# Patient Record
Sex: MALE | Race: WHITE | NOT HISPANIC OR LATINO | URBAN - METROPOLITAN AREA
[De-identification: names, ages, dates, MRNs, and addresses within clinical notes are randomized per-mention and may not be internally consistent; named-entity substitution may affect disease eponyms.]

---

## 2020-03-17 ENCOUNTER — INPATIENT (INPATIENT)
Facility: HOSPITAL | Age: 60
LOS: 3 days | Discharge: HOME | DRG: 312 | End: 2020-03-21
Attending: INTERNAL MEDICINE | Admitting: INTERNAL MEDICINE
Payer: COMMERCIAL

## 2020-03-17 VITALS
HEART RATE: 87 BPM | RESPIRATION RATE: 16 BRPM | TEMPERATURE: 99 F | HEIGHT: 66 IN | WEIGHT: 145.06 LBS | SYSTOLIC BLOOD PRESSURE: 109 MMHG | OXYGEN SATURATION: 99 % | DIASTOLIC BLOOD PRESSURE: 69 MMHG

## 2020-03-17 DIAGNOSIS — R55 SYNCOPE AND COLLAPSE: ICD-10-CM

## 2020-03-17 DIAGNOSIS — Z98.89 OTHER SPECIFIED POSTPROCEDURAL STATES: Chronic | ICD-10-CM

## 2020-03-17 LAB
ALBUMIN SERPL ELPH-MCNC: 3.8 G/DL — SIGNIFICANT CHANGE UP (ref 3.5–5)
ALP SERPL-CCNC: 62 U/L — SIGNIFICANT CHANGE UP (ref 40–120)
ALT FLD-CCNC: 32 U/L DA — SIGNIFICANT CHANGE UP (ref 10–60)
ANION GAP SERPL CALC-SCNC: 7 MMOL/L — SIGNIFICANT CHANGE UP (ref 5–17)
AST SERPL-CCNC: 39 U/L — SIGNIFICANT CHANGE UP (ref 10–40)
BASOPHILS # BLD AUTO: 0 K/UL — SIGNIFICANT CHANGE UP (ref 0–0.2)
BASOPHILS NFR BLD AUTO: 0 % — SIGNIFICANT CHANGE UP (ref 0–2)
BILIRUB SERPL-MCNC: 0.5 MG/DL — SIGNIFICANT CHANGE UP (ref 0.2–1.2)
BUN SERPL-MCNC: 31 MG/DL — HIGH (ref 7–18)
CALCIUM SERPL-MCNC: 8.4 MG/DL — SIGNIFICANT CHANGE UP (ref 8.4–10.5)
CHLORIDE SERPL-SCNC: 101 MMOL/L — SIGNIFICANT CHANGE UP (ref 96–108)
CO2 SERPL-SCNC: 27 MMOL/L — SIGNIFICANT CHANGE UP (ref 22–31)
CREAT SERPL-MCNC: 1.31 MG/DL — HIGH (ref 0.5–1.3)
EOSINOPHIL # BLD AUTO: 0 K/UL — SIGNIFICANT CHANGE UP (ref 0–0.5)
EOSINOPHIL NFR BLD AUTO: 0 % — SIGNIFICANT CHANGE UP (ref 0–6)
GLUCOSE SERPL-MCNC: 94 MG/DL — SIGNIFICANT CHANGE UP (ref 70–99)
HCT VFR BLD CALC: 34.1 % — LOW (ref 39–50)
HGB BLD-MCNC: 11.3 G/DL — LOW (ref 13–17)
LIDOCAIN IGE QN: 153 U/L — SIGNIFICANT CHANGE UP (ref 73–393)
LYMPHOCYTES # BLD AUTO: 30 % — SIGNIFICANT CHANGE UP (ref 13–44)
LYMPHOCYTES # BLD AUTO: 6.43 K/UL — HIGH (ref 1–3.3)
MANUAL SMEAR VERIFICATION: SIGNIFICANT CHANGE UP
MCHC RBC-ENTMCNC: 29.7 PG — SIGNIFICANT CHANGE UP (ref 27–34)
MCHC RBC-ENTMCNC: 33.1 GM/DL — SIGNIFICANT CHANGE UP (ref 32–36)
MCV RBC AUTO: 89.7 FL — SIGNIFICANT CHANGE UP (ref 80–100)
MONOCYTES # BLD AUTO: 0.64 K/UL — SIGNIFICANT CHANGE UP (ref 0–0.9)
MONOCYTES NFR BLD AUTO: 3 % — SIGNIFICANT CHANGE UP (ref 2–14)
NEUTROPHILS # BLD AUTO: 7.29 K/UL — SIGNIFICANT CHANGE UP (ref 1.8–7.4)
NEUTROPHILS NFR BLD AUTO: 31 % — LOW (ref 43–77)
NEUTS BAND # BLD: 3 % — SIGNIFICANT CHANGE UP (ref 0–8)
NRBC # BLD: 0 /100 — SIGNIFICANT CHANGE UP (ref 0–0)
PLAT MORPH BLD: NORMAL — SIGNIFICANT CHANGE UP
PLATELET # BLD AUTO: 117 K/UL — LOW (ref 150–400)
POTASSIUM SERPL-MCNC: 3.9 MMOL/L — SIGNIFICANT CHANGE UP (ref 3.5–5.3)
POTASSIUM SERPL-SCNC: 3.9 MMOL/L — SIGNIFICANT CHANGE UP (ref 3.5–5.3)
PROT SERPL-MCNC: 7.4 G/DL — SIGNIFICANT CHANGE UP (ref 6–8.3)
RBC # BLD: 3.8 M/UL — LOW (ref 4.2–5.8)
RBC # FLD: 13.5 % — SIGNIFICANT CHANGE UP (ref 10.3–14.5)
RBC BLD AUTO: NORMAL — SIGNIFICANT CHANGE UP
SODIUM SERPL-SCNC: 135 MMOL/L — SIGNIFICANT CHANGE UP (ref 135–145)
TROPONIN I SERPL-MCNC: <0.015 NG/ML — SIGNIFICANT CHANGE UP (ref 0–0.04)
VARIANT LYMPHS # BLD: 33 % — HIGH (ref 0–6)
WBC # BLD: 21.43 K/UL — HIGH (ref 3.8–10.5)
WBC # FLD AUTO: 21.43 K/UL — HIGH (ref 3.8–10.5)

## 2020-03-17 PROCEDURE — 99285 EMERGENCY DEPT VISIT HI MDM: CPT

## 2020-03-17 PROCEDURE — 70450 CT HEAD/BRAIN W/O DYE: CPT | Mod: 26

## 2020-03-17 PROCEDURE — 99223 1ST HOSP IP/OBS HIGH 75: CPT

## 2020-03-17 PROCEDURE — 74176 CT ABD & PELVIS W/O CONTRAST: CPT | Mod: 26

## 2020-03-17 PROCEDURE — 71046 X-RAY EXAM CHEST 2 VIEWS: CPT | Mod: 26

## 2020-03-17 RX ORDER — KETOROLAC TROMETHAMINE 30 MG/ML
15 SYRINGE (ML) INJECTION ONCE
Refills: 0 | Status: DISCONTINUED | OUTPATIENT
Start: 2020-03-17 | End: 2020-03-17

## 2020-03-17 RX ORDER — SODIUM CHLORIDE 9 MG/ML
1000 INJECTION INTRAMUSCULAR; INTRAVENOUS; SUBCUTANEOUS ONCE
Refills: 0 | Status: COMPLETED | OUTPATIENT
Start: 2020-03-17 | End: 2020-03-17

## 2020-03-17 RX ORDER — ASPIRIN/CALCIUM CARB/MAGNESIUM 324 MG
81 TABLET ORAL ONCE
Refills: 0 | Status: COMPLETED | OUTPATIENT
Start: 2020-03-17 | End: 2020-03-17

## 2020-03-17 RX ADMIN — SODIUM CHLORIDE 1000 MILLILITER(S): 9 INJECTION INTRAMUSCULAR; INTRAVENOUS; SUBCUTANEOUS at 22:34

## 2020-03-17 RX ADMIN — SODIUM CHLORIDE 1000 MILLILITER(S): 9 INJECTION INTRAMUSCULAR; INTRAVENOUS; SUBCUTANEOUS at 19:58

## 2020-03-17 RX ADMIN — Medication 15 MILLIGRAM(S): at 22:48

## 2020-03-17 RX ADMIN — Medication 15 MILLIGRAM(S): at 22:47

## 2020-03-17 RX ADMIN — Medication 81 MILLIGRAM(S): at 22:47

## 2020-03-17 NOTE — ED PROVIDER NOTE - MUSCULOSKELETAL, MLM
Spine appears normal, range of motion is not limited, no muscle or joint tenderness, b/l radial pulses., no CVA tenderness.

## 2020-03-17 NOTE — H&P ADULT - PROBLEM SELECTOR PLAN 1
Patient is being admitted for syncopal workup most likely vasovagal syncope.  Patient has nausea , vomiting with syncopal episode.  While trying to get orthostatic, he felt dizzy and his blood pressure dropped. s/p 1 L bolus followed by aggressive IV hydration.   EKG is negative for any acute ischemic changes, troponin T1 is negative. less likely to be cardiac. will trend troponin.   Monitor for cardiac arrythmia on telemetry.  CT head did show a questionable emboli.   Will admit to telemetry to rule out stroke.   No neurological changes on examination.   Offered CT angio but he refused because of ? allergy last time he had CT scan.

## 2020-03-17 NOTE — H&P ADULT - ASSESSMENT
60 years old male from home, lives with wife, with a PMHx of HLP (on simvastatin) and a significant PSHx of appendectomy, presents to the ED with complaints of unwitnessed syncopal episode x2. Patient reports he ate some pizza and then vomited. Notes when he got up, he felt a room spinning sensation, ear ringing and passed out. Notes no other symptoms after passing out. Patient states he then again had a second syncopal episode when he got up quickly from the first. Notes his has happened in the past which improved with rehydration. Patient wife states that  second episode was witnessed by her. Patient LOC was 1-2 minutes and patient was asymptomatic after the episode. Patient endorses mild upset stomach. Patient denies incontinence, chest pain, shortness of breath, fever, cough, burning urination or any other symptoms.  Patient is being admitted for syncopal workup most likely vasovagal syncope. EKG is negative for any acute ischemic changes, troponin T1 is negative. CT head did show a questionable emboli. Will admit to telemetry to rule out stroke. No neurological changes on examination. Offered CT angio but he refused because of ? allergy last time he had CT scan.  Interval history: While trying to get orthostatic, he felt dizzy and his blood pressure dropped. s/p 1 L bolus followed by aggressive IV hydration.   Allergies: Dye/contrast: difficulty breathing.

## 2020-03-17 NOTE — ED PROVIDER NOTE - CONSTITUTIONAL, MLM
normal... Nontoxic appearing, awake, alert, oriented to person, place, time/situation and in no apparent distress.

## 2020-03-17 NOTE — ED ADULT NURSE NOTE - RESPIRATORY ASSESSMENT
Cardiology Progress Note   Being discharged today  Note dictated    Diagnoses:  Acute on chronic diastolic heart failure  S/P karolyn clip with functional mild stenosis  Moderate pulmonary hypertension  CKD-stage 3  HTN  Afib-now in NSR, on anticoagulation  DM  Gout  Hx TIA  Reflux  Anemia with low iron    Bernie Schlatter, MD - - -

## 2020-03-17 NOTE — H&P ADULT - PROBLEM SELECTOR PLAN 3
IMPROVE VTE Individual Risk Assessment  RISK                                                          Points  [] Previous VTE                                           3  [] Thrombophilia                                        2  [] Lower limb paralysis                              2   [] Current Cancer                                       2   [] Immobilization > 24 hrs                        1  [] ICU/CCU stay > 24 hours                       1  [] Age > 60                                                   1  IMPROVE VTE Score = 2  lovenox 40 mg for DVT chemoprophylaxis Patient has history of HLP.  on simvastatin at home.  will resume.  follow up lipid panel in am

## 2020-03-17 NOTE — ED PROVIDER NOTE - PROGRESS NOTE DETAILS
Logan: ct head reviewed and concern for possible emboli vs artifact.  sella mass.  pt refused cta head and neck.  ct abd done shows gb stone.  neurologically intact. dysphagia pass. NIH 0  admit for syncope workup/.

## 2020-03-17 NOTE — ED PROVIDER NOTE - CLINICAL SUMMARY MEDICAL DECISION MAKING FREE TEXT BOX
59 y/o M pt presents with syncopal episode. Will r/o arrythmia vs acs vs pain induced syncope vs dehydration. Patient is otherwise neurologically intact. Patient has LOC, will r/o any possible intercranial bleed, hydrate and reassess. If asymptomatic, can possibly discharge patient home with outpatient syncope workup including Holter, cardiac and neuro follow up.

## 2020-03-17 NOTE — H&P ADULT - NSHPPHYSICALEXAM_GEN_ALL_CORE
Vital Signs Last 24 Hrs  T(C): 38.8 (18 Mar 2020 00:10), Max: 38.8 (18 Mar 2020 00:10)  T(F): 101.9 (18 Mar 2020 00:10), Max: 101.9 (18 Mar 2020 00:10)  HR: 85 (18 Mar 2020 00:10) (83 - 87)  BP: 88/54 (18 Mar 2020 01:19) (76/43 - 110/74)  BP(mean): --  RR: 20 (18 Mar 2020 01:19) (16 - 20)  SpO2: 98% (18 Mar 2020 01:19) (96% - 99%)  PHYSICAL EXAM:  GENERAL: NAD, speaks in full sentences, no signs of respiratory distress  HEAD:  Atraumatic, Normocephalic  EYES: EOMI, PERRLA, conjunctiva and sclera clear  NECK: Supple, No JVD  CHEST/LUNG: Clear to auscultation bilaterally; No wheeze; No crackles; No accessory muscles used  HEART: Regular rate and rhythm; No murmurs;   ABDOMEN: Soft, Nontender, Nondistended; Bowel sounds present; No guarding  EXTREMITIES:  2+ Peripheral Pulses, No cyanosis or edema  PSYCH: AAOx3  NEUROLOGY: non-focal  SKIN: No rashes or lesions

## 2020-03-17 NOTE — ED PROVIDER NOTE - OBJECTIVE STATEMENT
61 y/o M pt with a PMHx of HTN and a significant PSHx of appendectomy, presents to the ED with complaints of unwitnessed syncopal episode x2. Patient reports he ate some pizza and then vomited. Notes when he got up, he felt a room spinning sensation, ear ringing and passed out. Notes no other symptoms after passing out. Patient states he then again had a second syncopal episode when he got up quickly from the first. Notes his has happened in the past and he just hydrated. Patient endorses mild upset stomach. Patient denies incontinence, chest pain, shortness of breath, fever, cough, burning urination or any other symptoms. NKDA.

## 2020-03-17 NOTE — H&P ADULT - ATTENDING COMMENTS
Vital Signs Last 24 Hrs  T(C): 37.9 (17 Mar 2020 20:54), Max: 37.9 (17 Mar 2020 20:54)  T(F): 100.3 (17 Mar 2020 20:54), Max: 100.3 (17 Mar 2020 20:54)  HR: 83 (17 Mar 2020 20:54) (83 - 87)  BP: 110/74 (17 Mar 2020 20:54) (109/69 - 110/74)  BP(mean): --  RR: 17 (17 Mar 2020 20:54) (16 - 17)  SpO2: 99% (17 Mar 2020 20:54) (99% - 99%) Pt seen and examined. Case discussed with MAR  60 year old man with medical hx only for HLD presenting with sudden onset nausea, vomiting, diaphoresis, and abdominal pain/dizziness/vertigo after a meal of Pizza. This was followed by 2 episodes of unwitnessed syncope.   No symptoms of focal deficit.  Pt became feverish and hypotensive in the ED; IVF boluses were and sepsis work up ordered.    Vital Signs Last 24 Hrs  T(C): 37.9 (17 Mar 2020 20:54), Max: 37.9 (17 Mar 2020 20:54)  T(F): 100.3 (17 Mar 2020 20:54), Max: 100.3 (17 Mar 2020 20:54)  HR: 83 (17 Mar 2020 20:54) (83 - 87)  BP: 110/74 (17 Mar 2020 20:54) (109/69 - 110/74)  RR: 17 (17 Mar 2020 20:54) (16 - 17)  SpO2: 99% (17 Mar 2020 20:54) (99% - 99%)    Middle aged man, NAD AAO X 3  CTA B/L RRR S1S2 only  Soft NT ND BS +  No pedal edema  No focal deficits    Labs                        11.3   21.43 )-----------( 117      ( 17 Mar 2020 19:59 )             34.1     03-17    135  |  101  |  31<H>  ----------------------------<  94  3.9   |  27  |  1.31<H>    Ca    8.4      17 Mar 2020 19:59  TPro  7.4  /  Alb  3.8  /  TBili  0.5  /  DBili  x   /  AST  39  /  ALT  32  /  AlkPhos  62  03-17    CT head  No acute intracranial hemorrhage.   Apparent hyperdensity in the left intracranial ICA just proximal to the bifurcation. This may be artifactual or due to presence of embolus. If clinically indicated, CTA may be pursued for further evaluation.  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 If clinically indicated, MR may be pursued for further evaluation.    CT abdomen - unremarkable  CXR - no acute issues    Impression  - Syncope   1) vasovagal  2) Orthostatic  3) Posterior circulation CVA r/o  - +ve orthostasis especially dizziness with sitting up   Continue hydration and monitor for fluid overload  CT head noted; pt refused CTA head and neck because of prior allergic reaction to contrast with throat closing.  Neurology consult  MRA /MRI brain  Monitor closely.  Fever/hypotension  - Sepsis of unclear origin  Follow up sepsis work up  Continue aggressive fluid hydration   CXR unremarkable  UA pending  No active symptoms  Hold off antibiotics  F/up blood cxx Pt seen and examined. Case discussed with MAR  60 year old man with medical hx only for HLD presenting with sudden onset nausea, vomiting, diaphoresis, and abdominal pain/dizziness/vertigo after a meal of Pizza. This was followed by 2 episodes of unwitnessed syncope.   No symptoms of focal deficit.  Pt became feverish and hypotensive in the ED; IVF boluses were and sepsis work up ordered.    Vital Signs Last 24 Hrs  T(C): 37.9 (17 Mar 2020 20:54), Max: 37.9 (17 Mar 2020 20:54)  T(F): 100.3 (17 Mar 2020 20:54), Max: 100.3 (17 Mar 2020 20:54)  HR: 83 (17 Mar 2020 20:54) (83 - 87)  BP: 110/74 (17 Mar 2020 20:54) (109/69 - 110/74)  RR: 17 (17 Mar 2020 20:54) (16 - 17)  SpO2: 99% (17 Mar 2020 20:54) (99% - 99%)    Middle aged man, NAD AAO X 3  CTA B/L RRR S1S2 only  Soft NT ND BS +  No pedal edema  No focal deficits    Labs                        11.3   21.43 )-----------( 117      ( 17 Mar 2020 19:59 )             34.1     03-17    135  |  101  |  31<H>  ----------------------------<  94  3.9   |  27  |  1.31<H>    Ca    8.4      17 Mar 2020 19:59  TPro  7.4  /  Alb  3.8  /  TBili  0.5  /  DBili  x   /  AST  39  /  ALT  32  /  AlkPhos  62  03-17    CT head  No acute intracranial hemorrhage.   Apparent hyperdensity in the left intracranial ICA just proximal to the bifurcation. This may be artifactual or due to presence of embolus. If clinically indicated, CTA may be pursued for further evaluation.  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 If clinically indicated, MR may be pursued for further evaluation.    CT abdomen - unremarkable  CXR - no acute issues    Impression  - Syncope   1) vasovagal  2) Orthostatic  3) Posterior circulation CVA r/o  - +ve orthostasis especially dizziness with sitting up   Continue hydration and monitor for fluid overload  CT head noted; pt refused CTA head and neck because of prior allergic reaction to contrast with throat closing.  Neurology consult  MRA /MRI brain  Monitor closely.  Fever/hypotension  - Sepsis of unclear origin  Follow up sepsis work up  Continue aggressive fluid hydration   CXR unremarkable  UA pending  No active symptoms  Hold off antibiotics  F/up blood cx    - REBECCA  Pre-renal  IVF hydration Pt seen and examined. Case discussed with MAR  60 year old man with medical hx only for HLD presenting with sudden onset nausea, vomiting, diaphoresis, and abdominal pain/dizziness/vertigo after a meal of Pizza. This was followed by 2 episodes of unwitnessed syncope.   No symptoms of focal deficit.  Pt became feverish and hypotensive in the ED; IVF boluses were and sepsis work up ordered.    Vital Signs Last 24 Hrs  T(C): 37.9 (17 Mar 2020 20:54), Max: 37.9 (17 Mar 2020 20:54)  T(F): 100.3 (17 Mar 2020 20:54), Max: 100.3 (17 Mar 2020 20:54)  HR: 83 (17 Mar 2020 20:54) (83 - 87)  BP: 110/74 (17 Mar 2020 20:54) (109/69 - 110/74)  RR: 17 (17 Mar 2020 20:54) (16 - 17)  SpO2: 99% (17 Mar 2020 20:54) (99% - 99%)    Middle aged man, NAD AAO X 3  CTA B/L RRR S1S2 only  Soft NT ND BS +  No pedal edema  No focal deficits    Labs                        11.3   21.43 )-----------( 117      ( 17 Mar 2020 19:59 )             34.1     03-17    135  |  101  |  31<H>  ----------------------------<  94  3.9   |  27  |  1.31<H>    Ca    8.4      17 Mar 2020 19:59  TPro  7.4  /  Alb  3.8  /  TBili  0.5  /  DBili  x   /  AST  39  /  ALT  32  /  AlkPhos  62  03-17    CT head  No acute intracranial hemorrhage.   Apparent hyperdensity in the left intracranial ICA just proximal to the bifurcation. This may be artifactual or due to presence of embolus. If clinically indicated, CTA may be pursued for further evaluation.  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 If clinically indicated, MR may be pursued for further evaluation.    CT abdomen - unremarkable  CXR - no acute issues    Impression  - Syncope   1) vasovagal  2) Orthostatic  3) Posterior circulation CVA r/o  - +ve orthostasis especially dizziness with sitting up   Continue hydration and monitor for fluid overload  CT head noted; concern for carotid thrombus and sellar mass. Pt refused CTA head and neck because of prior allergic reaction to contrast with throat closing.  Neurology consult  MRA /MRI brain  Monitor closely.  Fever/hypotension  - Sepsis of unclear origin  Follow up sepsis work up  Continue aggressive fluid hydration   CXR unremarkable  UA pending  No active symptoms  Hold off antibiotics  F/up blood cx    - REBECCA  Pre-renal  IVF hydration

## 2020-03-17 NOTE — H&P ADULT - PROBLEM SELECTOR PLAN 2
Patient has history of HLP.  on simvastatin at home.  will resume.  follow up lipid panel in am Patient spiked a fever in ED.  No subjective fevers at home.  Patient was also hypotensive.  Will rule out sepsis.  CXR does not show any infiltrates, UA is negative, denies any GI symptoms.  F/u blood cultures and urine cultures.  will hold off on antibiotics for now.

## 2020-03-18 DIAGNOSIS — Z29.9 ENCOUNTER FOR PROPHYLACTIC MEASURES, UNSPECIFIED: ICD-10-CM

## 2020-03-18 DIAGNOSIS — A41.9 SEPSIS, UNSPECIFIED ORGANISM: ICD-10-CM

## 2020-03-18 DIAGNOSIS — Z71.89 OTHER SPECIFIED COUNSELING: ICD-10-CM

## 2020-03-18 DIAGNOSIS — E78.00 PURE HYPERCHOLESTEROLEMIA, UNSPECIFIED: ICD-10-CM

## 2020-03-18 DIAGNOSIS — R55 SYNCOPE AND COLLAPSE: ICD-10-CM

## 2020-03-18 DIAGNOSIS — Z02.9 ENCOUNTER FOR ADMINISTRATIVE EXAMINATIONS, UNSPECIFIED: ICD-10-CM

## 2020-03-18 LAB
ALBUMIN SERPL ELPH-MCNC: 2.5 G/DL — LOW (ref 3.5–5)
ALP SERPL-CCNC: 37 U/L — LOW (ref 40–120)
ALT FLD-CCNC: 23 U/L DA — SIGNIFICANT CHANGE UP (ref 10–60)
ANION GAP SERPL CALC-SCNC: 4 MMOL/L — LOW (ref 5–17)
APPEARANCE UR: CLEAR — SIGNIFICANT CHANGE UP
APTT BLD: 37.3 SEC — HIGH (ref 27.5–36.3)
AST SERPL-CCNC: 29 U/L — SIGNIFICANT CHANGE UP (ref 10–40)
BACTERIA # UR AUTO: ABNORMAL /HPF
BASOPHILS # BLD AUTO: 0.03 K/UL — SIGNIFICANT CHANGE UP (ref 0–0.2)
BASOPHILS NFR BLD AUTO: 0.2 % — SIGNIFICANT CHANGE UP (ref 0–2)
BILIRUB DIRECT SERPL-MCNC: 0.1 MG/DL — SIGNIFICANT CHANGE UP (ref 0–0.2)
BILIRUB INDIRECT FLD-MCNC: 0.3 MG/DL — SIGNIFICANT CHANGE UP (ref 0.2–1)
BILIRUB SERPL-MCNC: 0.4 MG/DL — SIGNIFICANT CHANGE UP (ref 0.2–1.2)
BILIRUB UR-MCNC: NEGATIVE — SIGNIFICANT CHANGE UP
BUN SERPL-MCNC: 21 MG/DL — HIGH (ref 7–18)
CALCIUM SERPL-MCNC: 6.3 MG/DL — CRITICAL LOW (ref 8.4–10.5)
CHLORIDE SERPL-SCNC: 112 MMOL/L — HIGH (ref 96–108)
CHOLEST SERPL-MCNC: 106 MG/DL — SIGNIFICANT CHANGE UP (ref 10–199)
CK MB BLD-MCNC: <0.5 % — SIGNIFICANT CHANGE UP (ref 0–3.5)
CK MB CFR SERPL CALC: <1 NG/ML — SIGNIFICANT CHANGE UP (ref 0–3.6)
CK SERPL-CCNC: 217 U/L — SIGNIFICANT CHANGE UP (ref 35–232)
CO2 SERPL-SCNC: 23 MMOL/L — SIGNIFICANT CHANGE UP (ref 22–31)
COLOR SPEC: YELLOW — SIGNIFICANT CHANGE UP
CORTIS AM PEAK SERPL-MCNC: 1.6 UG/DL — LOW (ref 6–18.4)
CREAT SERPL-MCNC: 0.98 MG/DL — SIGNIFICANT CHANGE UP (ref 0.5–1.3)
DIFF PNL FLD: ABNORMAL
EOSINOPHIL # BLD AUTO: 0.02 K/UL — SIGNIFICANT CHANGE UP (ref 0–0.5)
EOSINOPHIL NFR BLD AUTO: 0.1 % — SIGNIFICANT CHANGE UP (ref 0–6)
EPI CELLS # UR: SIGNIFICANT CHANGE UP /HPF
GLUCOSE SERPL-MCNC: 80 MG/DL — SIGNIFICANT CHANGE UP (ref 70–99)
GLUCOSE UR QL: NEGATIVE — SIGNIFICANT CHANGE UP
HBA1C BLD-MCNC: 5.5 % — SIGNIFICANT CHANGE UP (ref 4–5.6)
HCT VFR BLD CALC: 26.5 % — LOW (ref 39–50)
HCV AB S/CO SERPL IA: 0.16 S/CO — SIGNIFICANT CHANGE UP (ref 0–0.99)
HCV AB SERPL-IMP: SIGNIFICANT CHANGE UP
HDLC SERPL-MCNC: 32 MG/DL — LOW
HGB BLD-MCNC: 8.8 G/DL — LOW (ref 13–17)
HYALINE CASTS # UR AUTO: ABNORMAL /LPF
IMM GRANULOCYTES NFR BLD AUTO: 0.2 % — SIGNIFICANT CHANGE UP (ref 0–1.5)
INR BLD: 1.09 RATIO — SIGNIFICANT CHANGE UP (ref 0.88–1.16)
KETONES UR-MCNC: NEGATIVE — SIGNIFICANT CHANGE UP
LACTATE SERPL-SCNC: 0.5 MMOL/L — LOW (ref 0.7–2)
LACTATE SERPL-SCNC: 0.7 MMOL/L — SIGNIFICANT CHANGE UP (ref 0.7–2)
LEUKOCYTE ESTERASE UR-ACNC: NEGATIVE — SIGNIFICANT CHANGE UP
LIPID PNL WITH DIRECT LDL SERPL: 60 MG/DL — SIGNIFICANT CHANGE UP
LYMPHOCYTES # BLD AUTO: 15.97 K/UL — HIGH (ref 1–3.3)
LYMPHOCYTES # BLD AUTO: 80 % — HIGH (ref 13–44)
MAGNESIUM SERPL-MCNC: 1.7 MG/DL — SIGNIFICANT CHANGE UP (ref 1.6–2.6)
MCHC RBC-ENTMCNC: 29.7 PG — SIGNIFICANT CHANGE UP (ref 27–34)
MCHC RBC-ENTMCNC: 33.2 GM/DL — SIGNIFICANT CHANGE UP (ref 32–36)
MCV RBC AUTO: 89.5 FL — SIGNIFICANT CHANGE UP (ref 80–100)
MONOCYTES # BLD AUTO: 0.44 K/UL — SIGNIFICANT CHANGE UP (ref 0–0.9)
MONOCYTES NFR BLD AUTO: 2.2 % — SIGNIFICANT CHANGE UP (ref 2–14)
NEUTROPHILS # BLD AUTO: 3.47 K/UL — SIGNIFICANT CHANGE UP (ref 1.8–7.4)
NEUTROPHILS NFR BLD AUTO: 17.3 % — LOW (ref 43–77)
NITRITE UR-MCNC: NEGATIVE — SIGNIFICANT CHANGE UP
NRBC # BLD: 0 /100 WBCS — SIGNIFICANT CHANGE UP (ref 0–0)
PH UR: 6.5 — SIGNIFICANT CHANGE UP (ref 5–8)
PHOSPHATE SERPL-MCNC: 2.3 MG/DL — LOW (ref 2.5–4.5)
PLATELET # BLD AUTO: 98 K/UL — LOW (ref 150–400)
POTASSIUM SERPL-MCNC: 3.9 MMOL/L — SIGNIFICANT CHANGE UP (ref 3.5–5.3)
POTASSIUM SERPL-SCNC: 3.9 MMOL/L — SIGNIFICANT CHANGE UP (ref 3.5–5.3)
PROT SERPL-MCNC: 4.9 G/DL — LOW (ref 6–8.3)
PROT UR-MCNC: NEGATIVE — SIGNIFICANT CHANGE UP
PROTHROM AB SERPL-ACNC: 12.3 SEC — SIGNIFICANT CHANGE UP (ref 10–12.9)
RBC # BLD: 2.96 M/UL — LOW (ref 4.2–5.8)
RBC # FLD: 13.8 % — SIGNIFICANT CHANGE UP (ref 10.3–14.5)
RBC CASTS # UR COMP ASSIST: ABNORMAL /HPF (ref 0–2)
SODIUM SERPL-SCNC: 139 MMOL/L — SIGNIFICANT CHANGE UP (ref 135–145)
SP GR SPEC: 1.01 — SIGNIFICANT CHANGE UP (ref 1.01–1.02)
TOTAL CHOLESTEROL/HDL RATIO MEASUREMENT: 3.3 RATIO — LOW (ref 3.4–9.6)
TRIGL SERPL-MCNC: 69 MG/DL — SIGNIFICANT CHANGE UP (ref 10–149)
TROPONIN I SERPL-MCNC: <0.015 NG/ML — SIGNIFICANT CHANGE UP (ref 0–0.04)
TSH SERPL-MCNC: 0.57 UU/ML — SIGNIFICANT CHANGE UP (ref 0.34–4.82)
UROBILINOGEN FLD QL: NEGATIVE — SIGNIFICANT CHANGE UP
VIT B12 SERPL-MCNC: 781 PG/ML — SIGNIFICANT CHANGE UP (ref 232–1245)
WBC # BLD: 19.97 K/UL — HIGH (ref 3.8–10.5)
WBC # FLD AUTO: 19.97 K/UL — HIGH (ref 3.8–10.5)
WBC UR QL: SIGNIFICANT CHANGE UP /HPF (ref 0–5)

## 2020-03-18 PROCEDURE — 71275 CT ANGIOGRAPHY CHEST: CPT | Mod: 26

## 2020-03-18 PROCEDURE — 99232 SBSQ HOSP IP/OBS MODERATE 35: CPT

## 2020-03-18 PROCEDURE — 99223 1ST HOSP IP/OBS HIGH 75: CPT

## 2020-03-18 RX ORDER — ENOXAPARIN SODIUM 100 MG/ML
40 INJECTION SUBCUTANEOUS DAILY
Refills: 0 | Status: DISCONTINUED | OUTPATIENT
Start: 2020-03-18 | End: 2020-03-18

## 2020-03-18 RX ORDER — SODIUM CHLORIDE 9 MG/ML
1000 INJECTION INTRAMUSCULAR; INTRAVENOUS; SUBCUTANEOUS ONCE
Refills: 0 | Status: COMPLETED | OUTPATIENT
Start: 2020-03-18 | End: 2020-03-18

## 2020-03-18 RX ORDER — ACETAMINOPHEN 500 MG
650 TABLET ORAL EVERY 6 HOURS
Refills: 0 | Status: DISCONTINUED | OUTPATIENT
Start: 2020-03-18 | End: 2020-03-21

## 2020-03-18 RX ORDER — SIMVASTATIN 20 MG/1
1 TABLET, FILM COATED ORAL
Qty: 0 | Refills: 0 | DISCHARGE

## 2020-03-18 RX ORDER — ACETAMINOPHEN 500 MG
650 TABLET ORAL ONCE
Refills: 0 | Status: COMPLETED | OUTPATIENT
Start: 2020-03-18 | End: 2020-03-18

## 2020-03-18 RX ORDER — ENOXAPARIN SODIUM 100 MG/ML
60 INJECTION SUBCUTANEOUS
Refills: 0 | Status: DISCONTINUED | OUTPATIENT
Start: 2020-03-18 | End: 2020-03-18

## 2020-03-18 RX ORDER — NICOTINE POLACRILEX 2 MG
1 GUM BUCCAL DAILY
Refills: 0 | Status: DISCONTINUED | OUTPATIENT
Start: 2020-03-18 | End: 2020-03-20

## 2020-03-18 RX ORDER — SODIUM CHLORIDE 9 MG/ML
1000 INJECTION INTRAMUSCULAR; INTRAVENOUS; SUBCUTANEOUS
Refills: 0 | Status: DISCONTINUED | OUTPATIENT
Start: 2020-03-18 | End: 2020-03-21

## 2020-03-18 RX ADMIN — Medication 125 MILLIGRAM(S): at 08:22

## 2020-03-18 RX ADMIN — SODIUM CHLORIDE 1000 MILLILITER(S): 9 INJECTION INTRAMUSCULAR; INTRAVENOUS; SUBCUTANEOUS at 00:26

## 2020-03-18 RX ADMIN — SODIUM CHLORIDE 125 MILLILITER(S): 9 INJECTION INTRAMUSCULAR; INTRAVENOUS; SUBCUTANEOUS at 17:48

## 2020-03-18 RX ADMIN — SODIUM CHLORIDE 125 MILLILITER(S): 9 INJECTION INTRAMUSCULAR; INTRAVENOUS; SUBCUTANEOUS at 21:49

## 2020-03-18 RX ADMIN — SODIUM CHLORIDE 2000 MILLILITER(S): 9 INJECTION INTRAMUSCULAR; INTRAVENOUS; SUBCUTANEOUS at 05:29

## 2020-03-18 RX ADMIN — SODIUM CHLORIDE 1000 MILLILITER(S): 9 INJECTION INTRAMUSCULAR; INTRAVENOUS; SUBCUTANEOUS at 08:00

## 2020-03-18 RX ADMIN — SODIUM CHLORIDE 125 MILLILITER(S): 9 INJECTION INTRAMUSCULAR; INTRAVENOUS; SUBCUTANEOUS at 05:29

## 2020-03-18 RX ADMIN — Medication 650 MILLIGRAM(S): at 00:26

## 2020-03-18 RX ADMIN — ENOXAPARIN SODIUM 40 MILLIGRAM(S): 100 INJECTION SUBCUTANEOUS at 11:52

## 2020-03-18 RX ADMIN — Medication 650 MILLIGRAM(S): at 00:57

## 2020-03-18 NOTE — ED ADULT NURSE REASSESSMENT NOTE - NS ED NURSE REASSESS COMMENT FT1
pt has low B/P  ,boluses N/S given .pt asymptomatic .aaox4 ,ambulating to rest room . and DR. OLIVO at the bedside .b/p 87/54 hr 67 100% on room air .pt resting now .report given to HAMILTON RANGEL

## 2020-03-18 NOTE — CONSULT NOTE ADULT - CONSULT REQUESTED DATE/TIME
Pt called today with c/o pain in the area of the prostate, sluggish feeling and fatigue.  Pt states that he felt the same way last time he had prostatitis and says he felt better after being treated with abx.  He is calling today asking if he should be treated with abx again or if another treatment would be helpful.  Pt denies pain in groin, testicles and abd.  Pt also denies fever, chills, n/v and urinary symptoms.  Will discuss with Dr. Hollingsworth.    18-Mar-2020 15:54

## 2020-03-18 NOTE — CONSULT NOTE ADULT - ASSESSMENT
Assessment:  - Hypotension. Dehydration in the setting of vomiting and diarrhea vs Vasovagal R/O PE vs Sepsis? No identifiable source   - Syncope   - HLD    Plan:  Neuro:  - AAO X3  - avoid any sedating meds  - C/W sedation  -  No acute neurological changes on examination.       CV:  - Hypotension while trying to get orthostatics. Likely in the setting of dehydration.  - Clinically dehydrated. Received 3 liters of NS in the ED. Will give additional liter   - Will get Chest CT Angio R.O PE.  - EKG is negative for any acute ischemic changes. Troponin negative  - Monitor for cardiac arrythmia on telemetry.    Pulm:  - No active pulmonary disease  - Chest X-ray negative     ID:  - One episode of fever in the ED with Elevated WBC of 19K. No identifiable source at this time. U/A Negative and Chest X-ray Negative  - Hold off on Abx at this time  - Lactate Normal   - Check Blood Cultures    Nephro:  - No active Renal Issues  - Continue to follow     GI:  - npo for CT angio   -  Ct of the abdomen: No small bowel obstruction or active bowel inflammation. Cholelithiasis.     Heme:  - no indication for transfusion at this time.   - Hb of 8.8   - Recommenced Iron Studies and FOBT     Endo:  - no history of DM   - target CBG < 180  - Start diet when patient is able to tolerate    Prophy:  - C/W Lovenox     Dispo:  -  No need for ICU at this time. Reconsult as needed

## 2020-03-18 NOTE — PROGRESS NOTE ADULT - SUBJECTIVE AND OBJECTIVE BOX
HPI:  60 years old male from home, lives with wife, with a PMHx of HLP (on simvastatin) and a significant PSHx of appendectomy, presents to the ED with complaints of unwitnessed syncopal episode x2.  Patient examined at bedside, resting comfortably, denies pain, SOB or N&V.  Afebrile, VSS, NAD.    OVERNIGHT EVENTS:  No new overnight events     REVIEW OF SYSTEMS:      CONSTITUTIONAL: No fever,   EYES: no acute visual disturbances  NECK: No pain or stiffness  RESPIRATORY: No cough; No shortness of breath  CARDIOVASCULAR: No chest pain, no palpitations  GASTROINTESTINAL: No pain. No nausea, vomiting or diarrhea   NEUROLOGICAL: No headache or numbness, no tremors  MUSCULOSKELETAL: No joint pain, no muscle pain  GENITOURINARY: no dysuria, no frequency, no hesitancy  PSYCHIATRY: no depression , no anxiety  ALL OTHER  ROS negative        Vital Signs Last 24 Hrs  T(C): 37.2 (18 Mar 2020 10:58), Max: 38.8 (18 Mar 2020 00:10)  T(F): 99 (18 Mar 2020 10:58), Max: 101.9 (18 Mar 2020 00:10)  HR: 85 (18 Mar 2020 12:39) (67 - 89)  BP: 119/67 (18 Mar 2020 12:39) (76/43 - 119/67)  BP(mean): --  RR: 18 (18 Mar 2020 12:39) (16 - 20)  SpO2: 99% (18 Mar 2020 12:39) (94% - 99%)    ________________________________________________  PHYSICAL EXAM:    GENERAL: NAD  HEENT: Normocephalic; conjunctivae and sclerae clear; moist mucous membranes;   NECK : supple, no JVD  CHEST/LUNG: Clear to auscultation bilaterally   HEART: S1 S2  regular  ABDOMEN: Soft, Nontender, Nondistended; Bowel sounds present  EXTREMITIES: no cyanosis; no LE edema; no calf tenderness  SKIN: warm and dry; no rash  NERVOUS SYSTEM:  Alert & Oriented x3; no new deficits    _________________________________________________  CURRENT MEDICATIONS:    MEDICATIONS  (STANDING):  sodium chloride 0.9%. 1000 milliLiter(s) (125 mL/Hr) IV Continuous <Continuous>    MEDICATIONS  (PRN):  acetaminophen   Tablet .. 650 milliGRAM(s) Oral every 6 hours PRN Temp greater or equal to 38C (100.4F), Moderate Pain (4 - 6)      __________________________________________________  LABS:                          8.8    19.97 )-----------( 98       ( 18 Mar 2020 05:55 )             26.5     03-18    139  |  112<H>  |  21<H>  ----------------------------<  80  3.9   |  23  |  0.98    Ca    6.3<LL>      18 Mar 2020 05:55  Phos  2.3       Mg     1.7         TPro  4.9<L>  /  Alb  2.5<L>  /  TBili  0.4  /  DBili  0.1  /  AST  29  /  ALT  23  /  AlkPhos  37<L>  -18    PT/INR - ( 18 Mar 2020 08:33 )   PT: 12.3 sec;   INR: 1.09 ratio         PTT - ( 18 Mar 2020 08:33 )  PTT:37.3 sec  Urinalysis Basic - ( 18 Mar 2020 03:26 )    Color: Yellow / Appearance: Clear / S.010 / pH: x  Gluc: x / Ketone: Negative  / Bili: Negative / Urobili: Negative   Blood: x / Protein: Negative / Nitrite: Negative   Leuk Esterase: Negative / RBC: 2-5 /HPF / WBC 0-2 /HPF   Sq Epi: x / Non Sq Epi: Few /HPF / Bacteria: Few /HPF      CAPILLARY BLOOD GLUCOSE      POCT Blood Glucose.: 93 mg/dL (17 Mar 2020 17:27)      __________________________________________________  RADIOLOGY & ADDITIONAL TESTS:    Imaging Personally Reviewed:  YES    < from: CT Angio Chest w/ IV Cont (20 @ 11:27) >  Impression:    Small filling defect in a right upper lobe segmental pulmonary artery, compatible with pulmonary embolism.    Small bilateral pleural effusions.    < end of copied text >    < from: CT Head No Cont (20 @ 18:57) >  Impression: No acute intracranial hemorrhage.     Apparent hyperdensity in the left intracranial ICA just proximal to the bifurcation. This may be artifactual or due to presence of embolus. If clinically indicated, CTA may be pursued for further evaluation.    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 If clinically indicated, MR may be pursued for further evaluation.    < end of copied text >    Consultant(s) Notes Reviewed:   YES     Plan of care was discussed with patient and /or primary care giver; all questions and concerns were addressed and care was aligned with patient's wishes.    Plan discussed with attending and consulting physicians.

## 2020-03-18 NOTE — CONSULT NOTE ADULT - SUBJECTIVE AND OBJECTIVE BOX
NEUROLOGY CONSULT    HPI:  61 yo man with history of HLD and previous episodes of syncope due to dehydration, presenting with 2 episodes of syncope after vomiting and experiencing vertigo/tinnitus.  Loss of consciousness was brief with rapid recovering, no postictal confusion/disorientation.  No witnessed convulsive movements.  In ER, patient was orthostatic with low BP, which improved with IV hydration.  Patient was also febrile in ER, uncertain etiology.    Incidentally, on head CT there is a calcified sellar/suprasellar mass lesion, and neurology is consulted regarding this.  Patient did not previously know about this lesion.  No visual complaints, no headaches.    Labs:  WBC = 19.9  BUN/Cr = 31/1.31  UA - negative  CXR - negative    NEUROLOGICAL EXAM:  T 99.0  /67  HR 85  MS: Awake, alert, oriented x 4, language fluent, comprehension intact, naming intact, repetition intact, normal affect  CN: PERRLA, EOMI, visual fields intact, facial sensation intact, face symmetric, hearing intact, no dysarthria, symmetric palatal elevation, tongue midline, symmetric shoulder shrug and SCM strength  Motor: normal bulk, normal tone, power 5/5 in all four extremities, no tremors or fasciculations  Sensation: intact to light touch, vibration sense, proprioception  Reflexes: 2+ at biceps, brachioradialis, patella, ankle bilaterally.  Flexor plantar response bilaterally.  No clonus  Coordination: no dysmetria  Gait: normal, no ataxia  Romberg - negative    Assessment:  61 yo man with an apparent incidental finding of a sellar/suprasellar calcified mass, possible meningioma?    Syncope likely related to volume depletion.  Febrile illness, uncertain source.    Recommendations:  1. MRI brain c/s gadolinium to further delineate sellar lesion    Floyd Myles MD  [facility]

## 2020-03-18 NOTE — PROGRESS NOTE ADULT - ATTENDING COMMENTS
Syncope- PE on CTA cardiac work up.   Spoke to neuro will get MRI given brain mass, if MRI negative plan to start AC.   Anemia- GI work up.

## 2020-03-18 NOTE — CHART NOTE - NSCHARTNOTEFT_GEN_A_CORE
Informed by Radiologist, patient CT Angiogram showed RUL Pulmonary Embolism  D/C prophylactic Lovenox; Therapeutic Lovenox 1mg/SQ q 12 hrs ordered  Informed covering MD Dr. Blanco Informed by Radiologist, patient CT Angiogram showed RUL Pulmonary Embolism  D/C prophylactic Lovenox; Therapeutic Lovenox 1mg/SQ q 12 hrs ordered  but patient with sellar mass needs Neuro eval and MRI; Hold Lovenmox for now  discussed with covering MD Dr. Blanco

## 2020-03-18 NOTE — ED ADULT NURSE REASSESSMENT NOTE - NS ED NURSE REASSESS COMMENT FT1
Spoke to Dr De La Torre about patient's low BP.  As per Dr de la torre, pt is asymptomatic & appears stable. Ordered sepsis work-up & 1 L NS bolus.

## 2020-03-18 NOTE — PROGRESS NOTE ADULT - PROBLEM SELECTOR PLAN 2
24H TMAX 101.9  BP improved with IV hydration  Lactic 0.5  WBC 19.97  CXR & UA negative, no s/s GI upset  Blood cultures pending   Urine cultures pending   No antibiotics for now.

## 2020-03-18 NOTE — CONSULT NOTE ADULT - SUBJECTIVE AND OBJECTIVE BOX
=================Interval HPI===================  - HPI: 60 years old male from home, lives with wife, with a PMHx of HLP (on simvastatin) and a significant PSHx of appendectomy, presents to the ED with complaints of unwitnessed syncopal episode x2.    As per patient he reports he ate some pizza and then vomited. Notes when he got up, he felt a room spinning sensation, ear ringing and passed out. Notes no other symptoms after passing out. Patient states he then again had a second syncopal episode when he got up quickly from the first.  Patient's wife states that  second episode was witnessed by her. Patient LOC was 1-2 minutes and patient was asymptomatic after the episode. Patient endorses mild upset stomach with episodes of diarrhea.     Patient was admitted to telemetry for syncopal workup most likely vasovagal syncope.     ICU consulted for hypotension after checking orthostatics.       ================CHIEF COMPLAINT===============  Patient is a 60y old  Male who presents with a chief complaint of syncopal episodes x 2 (17 Mar 2020 23:24)    ============CURRENT MEDICATIONS===============    MEDICATIONS  (STANDING):  enoxaparin Injectable 40 milliGRAM(s) SubCutaneous daily  sodium chloride 0.9% Bolus 1000 milliLiter(s) IV Bolus once  sodium chloride 0.9%. 1000 milliLiter(s) (125 mL/Hr) IV Continuous <Continuous>    MEDICATIONS  (PRN):  acetaminophen   Tablet .. 650 milliGRAM(s) Oral every 6 hours PRN Temp greater or equal to 38C (100.4F), Moderate Pain (4 - 6)        ============REVIEW OF SYSTEMS==================    CONSTITUTIONAL: Episode of fever in the ED 38.8  EYES: no acute visual disturbances  NECK: No pain or stiffness  RESPIRATORY: No cough; No shortness of breath  CARDIOVASCULAR: No chest pain, no palpitations  GASTROINTESTINAL: No pain. No nausea or vomiting; No diarrhea   NEUROLOGICAL: No headache or numbness, no tremors  MUSCULOSKELETAL: No joint pain, no muscle pain  GENITOURINARY: no dysuria, no frequency, no hesitancy  PSYCHIATRY: no depression , no anxiety  ALL OTHER  ROS negative      ================VITALS SIGNS=====================  Vital Signs Last 24 Hrs  T(C): 36.7 (18 Mar 2020 07:46), Max: 38.8 (18 Mar 2020 00:10)  T(F): 98 (18 Mar 2020 07:46), Max: 101.9 (18 Mar 2020 00:10)  HR: 67 (18 Mar 2020 08:07) (67 - 89)  BP: 94/59 (18 Mar 2020 08:07) (76/43 - 110/74)  BP(mean): --  RR: 18 (18 Mar 2020 07:46) (16 - 20)  SpO2: 98% (18 Mar 2020 07:46) (94% - 99%)    ===============PHYSICAL EXAM====================    GENERAL: NAD.   HEENT: Normocephalic;  conjunctivae and sclerae clear; moist mucous membranes;   NECK : supple  CHEST/LUNG: Clear to auscultation bilaterally with good air entry   HEART: S1 S2  regular; no murmurs, gallops or rubs  ABDOMEN: Soft, Nontender, Nondistended; Bowel sounds present  EXTREMITIES: no cyanosis; no edema; no calf tenderness  SKIN: warm and dry; no rash  NERVOUS SYSTEM:  Awake and alert; Oriented  to place, person and time    ==============LABORATORIES======================  LABS:                        8.8    19.97 )-----------( 98       ( 18 Mar 2020 05:55 )             26.5     03-18    139  |  112<H>  |  21<H>  ----------------------------<  80  3.9   |  23  |  0.98    Ca    6.3<LL>      18 Mar 2020 05:55  Phos  2.3       Mg     1.7         TPro  4.9<L>  /  Alb  2.5<L>  /  TBili  0.4  /  DBili  0.1  /  AST  29  /  ALT  23  /  AlkPhos  37<L>        Urinalysis Basic - ( 18 Mar 2020 03:26 )    Color: Yellow / Appearance: Clear / S.010 / pH: x  Gluc: x / Ketone: Negative  / Bili: Negative / Urobili: Negative   Blood: x / Protein: Negative / Nitrite: Negative   Leuk Esterase: Negative / RBC: 2-5 /HPF / WBC 0-2 /HPF   Sq Epi: x / Non Sq Epi: Few /HPF / Bacteria: Few /HPF      CAPILLARY BLOOD GLUCOSE      POCT Blood Glucose.: 93 mg/dL (17 Mar 2020 17:27)      =============INPUTS/OUPUTS=====================        RADIOLOGY & ADDITIONAL TESTS:    Imaging Personally Reviewed:  YES    Consultant(s) Notes Reviewed:   YES    Care Discussed with Consultants : YES    Plan of care was discussed with patient  and /or primary care giver; all questions and concerns were addressed and care was aligned with patient's wishes. Time was allowed for questions that were answered to the best of my abilities

## 2020-03-18 NOTE — CONSULT NOTE ADULT - SUBJECTIVE AND OBJECTIVE BOX
Time of visit:    CHIEF COMPLAINT: Patient is a 60y old  Male who presents with a chief complaint of syncopal episodes x 2 (18 Mar 2020 13:11)      HPI:  60 years old male from home, lives with wife, with a PMHx of HLP (on simvastatin) and a significant PSHx of appendectomy, presents to the ED with complaints of unwitnessed syncopal episode x2. Patient reports he ate some pizza and then vomited. Notes when he got up, he felt a room spinning sensation, ear ringing and passed out. Notes no other symptoms after passing out. Patient states he then again had a second syncopal episode when he got up quickly from the first. Notes his has happened in the past which improved with rehydration. Patient wife states that  second episode was witnessed by her. Patient LOC was 1-2 minutes and patient was asymptomatic after the episode. Patient endorses mild upset stomach. Patient denies incontinence, chest pain, shortness of breath, fever, cough, burning urination or any other symptoms.  Patient is being admitted for syncopal workup most likely vasovagal syncope. EKG is negative for any acute ischemic changes, troponin T1 is negative. CT head did show a questionable emboli. Will admit to telemetry to rule out stroke. No neurological changes on examination. Offered CT angio but he refused because of ? allergy last time he had CT scan.  Interval history: While trying to get orthostatic, he felt dizzy and his blood pressure dropped. s/p 1 L bolus followed by aggressive IV hydration.   Allergies: Dye/contrast: difficulty breathing. (17 Mar 2020 23:24)   Patient seen and examined.     PAST MEDICAL & SURGICAL HISTORY:  Hypercholesterolemia  S/P appendectomy: 10 years ago      Allergies    No Known Allergies    Intolerances        MEDICATIONS  (STANDING):  sodium chloride 0.9%. 1000 milliLiter(s) (125 mL/Hr) IV Continuous <Continuous>      MEDICATIONS  (PRN):  acetaminophen   Tablet .. 650 milliGRAM(s) Oral every 6 hours PRN Temp greater or equal to 38C (100.4F), Moderate Pain (4 - 6)   Medications up to date at time of exam.    Medications up to date at time of exam.    FAMILY HISTORY:      SOCIAL HISTORY  Smoking History: [ x  ] smoking/smoke exposure,  1/2 -1 PPD .. currently smoke 1/2 PPD  Living Condition: [   ] apartment, [ x  ] private house  Work History:  [email or web address]  Travel History: denies recent travel  Illicit Substance Use: denies  Alcohol Use: denies    REVIEW OF SYSTEMS:    CONSTITUTIONAL:  denies fevers, chills, sweats, weight loss    HEENT:  denies diplopia or blurred vision, sore throat or runny nose.    CARDIOVASCULAR:  denies pressure, squeezing, tightness, or heaviness about the chest; no palpitations.    RESPIRATORY:  denies SOB, cough, [place], wheezing.    GASTROINTESTINAL:  denies abdominal pain, nausea, vomiting or diarrhea.    GENITOURINARY: denies dysuria, frequency or urgency.    NEUROLOGIC:  denies numbness, tingling, seizures or weakness.    PSYCHIATRIC:  denies disorder of thought or mood.    MSK: denies swelling, redness      PHYSICAL EXAMINATION:    GENERAL: The patient is a well-developed, well-nourished, in no apparent distress.     Vital Signs Last 24 Hrs  T(C): 37.4 (18 Mar 2020 15:31), Max: 38.8 (18 Mar 2020 00:10)  T(F): 99.4 (18 Mar 2020 15:31), Max: 101.9 (18 Mar 2020 00:10)  HR: 79 (18 Mar 2020 15:31) (67 - 89)  BP: 101/68 (18 Mar 2020 15:31) (76/43 - 119/67)  BP(mean): --  RR: 18 (18 Mar 2020 15:31) (16 - 20)  SpO2: 100% (18 Mar 2020 15:31) (94% - 100%)   (if applicable)    Chest Tube (if applicable)    HEENT: Head is normocephalic and atraumatic. Extraocular muscles are intact. Mucous membranes are moist.     NECK: Supple, no palpable adenopathy.    LUNGS: Clear to auscultation, no wheezing, rales, or rhonchi.    HEART: Regular rate and rhythm without murmur.    ABDOMEN: Soft, nontender, and nondistended.  No hepatosplenomegaly is noted.    RENAL: No difficulty voiding, no pelvic pain    EXTREMITIES: Without any cyanosis, clubbing, rash, lesions or edema.    NEUROLOGIC: Awake, alert, oriented, grossly intact    SKIN: Warm, dry, good turgor.      LABS:                        8.8    19.97 )-----------( 98       ( 18 Mar 2020 05:55 )             26.5     0318    139  |  112<H>  |  21<H>  ----------------------------<  80  3.9   |  23  |  0.98    Ca    6.3<LL>      18 Mar 2020 05:55  Phos  2.3       Mg     1.7         TPro  4.9<L>  /  Alb  2.5<L>  /  TBili  0.4  /  DBili  0.1  /  AST  29  /  ALT  23  /  AlkPhos  37<L>      PT/INR - ( 18 Mar 2020 08:33 )   PT: 12.3 sec;   INR: 1.09 ratio         PTT - ( 18 Mar 2020 08:33 )  PTT:37.3 sec  Urinalysis Basic - ( 18 Mar 2020 03:26 )    Color: Yellow / Appearance: Clear / S.010 / pH: x  Gluc: x / Ketone: Negative  / Bili: Negative / Urobili: Negative   Blood: x / Protein: Negative / Nitrite: Negative   Leuk Esterase: Negative / RBC: 2-5 /HPF / WBC 0-2 /HPF   Sq Epi: x / Non Sq Epi: Few /HPF / Bacteria: Few /HPF        CARDIAC MARKERS ( 18 Mar 2020 05:55 )  <0.015 ng/mL / x     / 217 U/L / x     / <1.0 ng/mL  CARDIAC MARKERS ( 17 Mar 2020 19:59 )  <0.015 ng/mL / x     / x     / x     / x              Lactate, Blood: 0.5 mmol/L (20 @ 08:32)  Lactate, Blood: 0.7 mmol/L (20 @ 03:26)        MICROBIOLOGY: (if applicable)    RADIOLOGY & ADDITIONAL STUDIES:  EKG:   CT chest;< from: CT Angio Chest w/ IV Cont (20 @ 11:27) >    EXAM:  CT ANGIO CHEST (W)AW IC                            PROCEDURE DATE:  2020          INTERPRETATION:  Pulmonary CTA    Indication: Syncope and collapse.    Technique: Axial multidetector CT images of the chest are acquired from the thoracic inlet to the upper abdomen following the administration of IV contrast ( 60cc Omnipaque-350, 40cc discarded) according to our pulmonary embolism protocol. Subsequent MIP is also reconstructed for evaluation.    Comparison: None.    Findings: No evidence for aortic aneurysm. There is a small filling defect in a right upper lobe segmental pulmonary artery (image 45 series 5), compatible with pulmonary embolism.    Small bilateral pleural effusions. No pericardial effusion. The heart is not enlarged. Possible mural thickening at the upper esophagus; EGD may be pursued for further evaluation. Nonspecific mildly enlarged 1.5 cm lymph node at the zygoesophageal recess. Nonspecific mildly enlarged 1.3 cm left hilar lymph node. Nonspecific mildly enlarged 1.5 cm right hilar lymph node.     The trachea and central bronchi are patent.    No evidence for pneumothorax.    Biapical pleural-parenchymal scarring. Multiple small blebs bilaterally. Mild bilateral atelectasis. Interlobular septal thickening in both lungs, suggestive of interstitial pulmonary edema. There is a nonspecific 5 mm subpleural right lower lobe lung nodule (image 70 series 5). There is another 4 mm subpleural right lower lobe lung nodule (image 16 series 5).     Limited sections through the upper abdomen demonstrate cholelithiasis.    Impression:    Small filling defect in a right upper lobe segmental pulmonary artery, compatible with pulmonary embolism.    Small bilateral pleural effusions.    Possible mural thickening at the upper esophagus; EGD may be pursued for further evaluation.    Nonspecific mildly enlarged mediastinal and bihilar lymph nodes.    Interlobular septal thickening in both lungs, suggestive of interstitial pulmonary edema.    Small right lung nodules as described above. If the patient is in the high risk category (i.e. smoker), follow-up chest CT may be pursued in 12 months to ensure stability.    Cholelithiasis.    Dr. Guardado is informed.                GILDARDO GUTIERREZ M.D., ATTENDING RADIOLOGIST  This document has been electronically signed. Mar 18 2020 12:10PM              CT head: < from: CT Head No Cont (20 @ 18:57) >    EXAM:  CT BRAIN                            PROCEDURE DATE:  2020          INTERPRETATION:  Head CT without IV contrast     Clinical Indication: syncope    Technique: Axial CT images of the head are obtained from the skull base to the vertex without IV contrast.    Comparison: None.    Findings: There is no acute intracranial hemorrhage. Apparent hyperdensity in the left intracranial ICA just proximal to the bifurcation. This may be artifactual or due to presence of embolus. There is no space-occupying lesion, midline shift, or herniation. The ventricles maintain normal size, shape, and location. The sulci are symmetric and normal for age bilaterally. No extra-axial fluid collection. The gray-white differentiation is preserved. 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     The visualized orbits appear grossly unremarkable. Fluid level in the right maxillary sinus. Partial opacifications in the maxillary sinuses bilaterally.    Impression: No acute intracranial hemorrhage.     Apparent hyperdensity in the left intracranial ICA just proximal to the bifurcation. This may be artifactual or due to presence of embolus. If clinically indicated, CTA may be pursued for further evaluation.    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 If clinically indicated, MR may be pursued for further evaluation.    Dr. Ronn Logan is informed.                GILDARDO GUTIERREZ M.D., ATTENDING RADIOLOGIST  This document has been electronically signed. 2020  7:17PM                < end of copied text >      	    IMPRESSION: 60y Male PAST MEDICAL & SURGICAL HISTORY:  Hypercholesterolemia  S/P appendectomy: 10 years ago   p/w                 IMP: This is a 60 yr old man , active smoker, HLD presented with syncope. CT head ? sella mass.  CTPA show small RUL PE, i doubt this is the etiology of syncope . There is b/l mediastinal adenopathy with cystic b/l upper lobe lesion due to smoke exposure vs sarcoidosis.     -B/L mediastinal hilar adenopathy  -ACE level  -repeat CT with contrast in 3-4 months ( all elective procedures cancelled due to COVID-19 pandemic)  -? PE  -symptomatic anemia  -syncope work up  -malignancy work up  -MRI/MRA of brain  -neuro eval  -i would not anticoagulate until further work up of brain lesions or seen by neuro  -V/Q scan  -out pat pul f/u with my office  -out pat PFT  -advise to stop smoking   -nicotine patch 7    i discussed with wife at bedside and with Dr Blanco

## 2020-03-19 ENCOUNTER — TRANSCRIPTION ENCOUNTER (OUTPATIENT)
Age: 60
End: 2020-03-19

## 2020-03-19 LAB
24R-OH-CALCIDIOL SERPL-MCNC: 26.7 NG/ML — LOW (ref 30–80)
ALBUMIN SERPL ELPH-MCNC: 3.1 G/DL — LOW (ref 3.5–5)
ALP SERPL-CCNC: 42 U/L — SIGNIFICANT CHANGE UP (ref 40–120)
ALT FLD-CCNC: 29 U/L DA — SIGNIFICANT CHANGE UP (ref 10–60)
ANION GAP SERPL CALC-SCNC: 5 MMOL/L — SIGNIFICANT CHANGE UP (ref 5–17)
AST SERPL-CCNC: 36 U/L — SIGNIFICANT CHANGE UP (ref 10–40)
BILIRUB SERPL-MCNC: 0.3 MG/DL — SIGNIFICANT CHANGE UP (ref 0.2–1.2)
BUN SERPL-MCNC: 15 MG/DL — SIGNIFICANT CHANGE UP (ref 7–18)
CA-I BLD-SCNC: 1.12 MMOL/L — SIGNIFICANT CHANGE UP (ref 1.12–1.3)
CALCIUM SERPL-MCNC: 7.6 MG/DL — LOW (ref 8.4–10.5)
CHLORIDE SERPL-SCNC: 115 MMOL/L — HIGH (ref 96–108)
CO2 SERPL-SCNC: 22 MMOL/L — SIGNIFICANT CHANGE UP (ref 22–31)
CREAT SERPL-MCNC: 1.05 MG/DL — SIGNIFICANT CHANGE UP (ref 0.5–1.3)
CULTURE RESULTS: NO GROWTH — SIGNIFICANT CHANGE UP
GLUCOSE SERPL-MCNC: 110 MG/DL — HIGH (ref 70–99)
HCT VFR BLD CALC: 28.4 % — LOW (ref 39–50)
HGB BLD-MCNC: 9.3 G/DL — LOW (ref 13–17)
MCHC RBC-ENTMCNC: 29.6 PG — SIGNIFICANT CHANGE UP (ref 27–34)
MCHC RBC-ENTMCNC: 32.7 GM/DL — SIGNIFICANT CHANGE UP (ref 32–36)
MCV RBC AUTO: 90.4 FL — SIGNIFICANT CHANGE UP (ref 80–100)
NRBC # BLD: 0 /100 WBCS — SIGNIFICANT CHANGE UP (ref 0–0)
PHOSPHATE SERPL-MCNC: 2.4 MG/DL — LOW (ref 2.5–4.5)
PLATELET # BLD AUTO: 115 K/UL — LOW (ref 150–400)
POTASSIUM SERPL-MCNC: 3.9 MMOL/L — SIGNIFICANT CHANGE UP (ref 3.5–5.3)
POTASSIUM SERPL-SCNC: 3.9 MMOL/L — SIGNIFICANT CHANGE UP (ref 3.5–5.3)
PROT SERPL-MCNC: 6 G/DL — SIGNIFICANT CHANGE UP (ref 6–8.3)
RBC # BLD: 3.14 M/UL — LOW (ref 4.2–5.8)
RBC # FLD: 13.9 % — SIGNIFICANT CHANGE UP (ref 10.3–14.5)
SODIUM SERPL-SCNC: 142 MMOL/L — SIGNIFICANT CHANGE UP (ref 135–145)
SPECIMEN SOURCE: SIGNIFICANT CHANGE UP
WBC # BLD: 15.46 K/UL — HIGH (ref 3.8–10.5)
WBC # FLD AUTO: 15.46 K/UL — HIGH (ref 3.8–10.5)

## 2020-03-19 PROCEDURE — 99233 SBSQ HOSP IP/OBS HIGH 50: CPT | Mod: GC

## 2020-03-19 PROCEDURE — 93306 TTE W/DOPPLER COMPLETE: CPT | Mod: 26

## 2020-03-19 PROCEDURE — 70553 MRI BRAIN STEM W/O & W/DYE: CPT | Mod: 26

## 2020-03-19 PROCEDURE — 78579 LUNG VENTILATION IMAGING: CPT | Mod: 26

## 2020-03-19 RX ORDER — POTASSIUM PHOSPHATE, MONOBASIC POTASSIUM PHOSPHATE, DIBASIC 236; 224 MG/ML; MG/ML
15 INJECTION, SOLUTION INTRAVENOUS ONCE
Refills: 0 | Status: COMPLETED | OUTPATIENT
Start: 2020-03-19 | End: 2020-03-19

## 2020-03-19 RX ADMIN — POTASSIUM PHOSPHATE, MONOBASIC POTASSIUM PHOSPHATE, DIBASIC 62.5 MILLIMOLE(S): 236; 224 INJECTION, SOLUTION INTRAVENOUS at 06:21

## 2020-03-19 RX ADMIN — SODIUM CHLORIDE 125 MILLILITER(S): 9 INJECTION INTRAMUSCULAR; INTRAVENOUS; SUBCUTANEOUS at 11:38

## 2020-03-19 RX ADMIN — SODIUM CHLORIDE 125 MILLILITER(S): 9 INJECTION INTRAMUSCULAR; INTRAVENOUS; SUBCUTANEOUS at 21:31

## 2020-03-19 NOTE — DISCHARGE NOTE PROVIDER - CARE PROVIDER_API CALL
Alessia Harper)  Medicine  Dept Director  [address] [place] [address]  Phone: [phone]  Fax: [phone]  Follow Up Time:     Floyd Myles)  Clinical Neurophysiology; Neurology  [address] [facility]  [place] [address]  Phone: [phone]  Fax: [phone]  Follow Up Time:     Víctor Mina)  Cardiovascular Disease  [address] [facility] [facility] [address]  [place] [address]  Phone: [phone]  Fax: [phone]  Follow Up Time: Alessia Harper)  Medicine  Dept Director  [address] [place] [address]  Phone: [phone]  Fax: [phone]  Follow Up Time:     Floyd Myles)  Clinical Neurophysiology; Neurology  [address] [facility] [address]  Phone: [phone]  Fax: [phone]  Follow Up Time:     Víctor Mina)  Cardiovascular Disease  [address] [facility] [facility] [address]  [place] [address]  Phone: [phone]  Fax: [phone]  Follow Up Time:     Almita Abarca)  Internal Medicine  [address] [facility], [address] [place] [address]  Phone: [phone]  Follow Up Time: Alessia Harper)  Medicine  Dept Director  [address] [place] [address]  Phone: [phone]  Fax: [phone]  Follow Up Time:     Víctor Mina)  Cardiovascular Disease  [address] [facility], [address]  [place] [address]  Phone: [phone]  Fax: [phone]  Follow Up Time:     Almita Abarca)  Internal Medicine  [address] [facility], [address]rd [place] [address]  Phone: [phone]  Follow Up Time:     Fabiola Torres)  Clinical Neurophysiology; Neurology  [address] [facility]nd [place] [address]  Phone: [phone]  Fax: [phone]  Follow Up Time: 2 weeks

## 2020-03-19 NOTE — DISCHARGE NOTE PROVIDER - PROVIDER TOKENS
PROVIDER:[TOKEN:[[id number]]],PROVIDER:[TOKEN:[[id number]]],PROVIDER:[TOKEN:[[id number]]] PROVIDER:[TOKEN:[[id number]]],PROVIDER:[TOKEN:[[id number]]],PROVIDER:[TOKEN:[[id number]]],PROVIDER:[TOKEN:[[id number]]] PROVIDER:[TOKEN:[[id number]]],PROVIDER:[TOKEN:[[id number]]],PROVIDER:[TOKEN:[[id number]]],PROVIDER:[TOKEN:[[id number]],FOLLOWUP:[2 weeks]]

## 2020-03-19 NOTE — PROGRESS NOTE ADULT - ASSESSMENT
60 years old male from home, lives with wife, with a PMHx of HLP (on simvastatin) and a significant PSHx of appendectomy, presents to the ED with complaints of unwitnessed syncopal episode x2. Patient reports he ate some pizza and then vomited. Notes when he got up, he felt a room spinning sensation, ear ringing and passed out. Notes no other symptoms after passing out. Patient states he then again had a second syncopal episode when he got up quickly from the first. Notes his has happened in the past which improved with rehydration. Patient wife states that  second episode was witnessed by her. Patient LOC was 1-2 minutes and patient was asymptomatic after the episode. Patient endorses mild upset stomach. Patient denies incontinence, chest pain, shortness of breath, fever, cough, burning urination or any other symptoms.  Patient is being admitted for syncopal workup most likely vasovagal syncope. EKG is negative for any acute ischemic changes, troponin T1 is negative. CT head did show a questionable emboli. Will admit to telemetry to rule out stroke. No neurological changes on examination. Offered CT angio but he refused because of ? allergy last time he had CT scan.  Interval history: While trying to get orthostatic, he felt dizzy and his blood pressure dropped. s/p 1 L bolus followed by aggressive IV hydration.   Allergies: Dye/contrast: difficulty breathing. 60 years old male from home, lives with wife, with a PMHx of HLP (on simvastatin) and a significant PSHx of appendectomy, presents to the ED with complaints of unwitnessed syncopal episode x2. Patient reports he ate some pizza and then vomited. Notes when he got up, he felt a room spinning sensation, ear ringing and passed out. Notes no other symptoms after passing out. Patient states he then again had a second syncopal episode when he got up quickly from the first. Notes his has happened in the past which improved with rehydration. Patient wife states that  second episode was witnessed by her. Patient LOC was 1-2 minutes and patient was asymptomatic after the episode. Patient endorses mild upset stomach. Patient denies incontinence, chest pain, shortness of breath, fever, cough, burning urination or any other symptoms.  Patient is being admitted for syncopal workup most likely vasovagal syncope. EKG is negative for any acute ischemic changes, troponin T1 is negative. CT head did show a questionable emboli. Will admit to telemetry to rule out stroke. No neurological changes on examination. Offered CT angio but he refused because of ? allergy last time he had CT scan.  Interval history: While trying to get orthostatic, he felt dizzy and his blood pressure dropped. s/p 1 L bolus followed by aggressive IV hydration.   Allergies: Dye/contrast: difficulty breathing.  Wife number [phone]

## 2020-03-19 NOTE — PROGRESS NOTE ADULT - ATTENDING COMMENTS
Patient seen and examined; Agree with PGY1 A/P above with editing as needed. My independent assessment, findings on exam, diagnosis and plan of care as listed below. Discussed with Dr. Marin.    61 y/o Male admitted with Syncope with leucocytosis trending down well with fluids, concern for possible PE with CT Angio showing possible RUL PE but doubt as per Pu    Vital Signs Last 24 Hrs  T(C): 36.5 (19 Mar 2020 16:02), Max: 37.6 (18 Mar 2020 19:46)  T(F): 97.7 (19 Mar 2020 16:02), Max: 99.7 (18 Mar 2020 19:46)  HR: 99 (19 Mar 2020 16:02) (77 - 99)  BP: 119/68 (19 Mar 2020 16:02) (105/65 - 119/68)  RR: 18 (19 Mar 2020 16:02) (18 - 18)  SpO2: 99% (19 Mar 2020 16:02) (95% - 99%) Patient seen and examined; Agree with PGY1 A/P above with editing as needed. My independent assessment, findings on exam, diagnosis and plan of care as listed below. Discussed with Dr. Marin.    59 y/o Male admitted with Syncope with leucocytosis trending down well with fluids, concern for possible PE with CT Angio showing possible RUL PE but doubt as per Pulmonary. No new complaints.     Vital Signs Last 24 Hrs  T(C): 36.5 (19 Mar 2020 16:02), Max: 37.6 (18 Mar 2020 19:46)  T(F): 97.7 (19 Mar 2020 16:02), Max: 99.7 (18 Mar 2020 19:46)  HR: 99 (19 Mar 2020 16:02) (77 - 99)  BP: 119/68 (19 Mar 2020 16:02) (105/65 - 119/68)  RR: 18 (19 Mar 2020 16:02) (18 - 18)  SpO2: 99% (19 Mar 2020 16:02) (95% - 99%)    P/E:  Neuro: Alert, Awake, oriented x 3  CVS: S1S2 present  resp: BLAE+, No wheeze or rhonchi  GI: Soft, BS+, NT, ND  Extr; no edema or calf tenderness     Labs:                        9.3    15.46 )-----------( 115      ( 19 Mar 2020 08:20 )             28.4   03-19    142  |  115<H>  |  15  ----------------------------<  110<H>  3.9   |  22  |  1.05    Ca    7.6<L>      19 Mar 2020 08:20  Phos  2.4     03-19  Mg     1.7     03-18    TPro  6.0  /  Alb  3.1<L>  /  TBili  0.3  /  DBili  x   /  AST  36  /  ALT  29  /  AlkPhos  42  03-19    NM Pulmonary Ventilation Scan (03.19.20 @ 10:47)     FINDINGS: There is matched defect in the apical segment of the right upper lobe with no corresponding chest x-ray opacity. There is mildly heterogeneous radiotracer distribution in the lungs on both the ventilation and the perfusion images.     IMPRESSION: Abnormal ventilation/perfusion lung scan. Low probability of pulmonary embolus.    D/D:  Syncope appears to be vasovagal related to dehydration  Leucocytosis likely reactive resolving  PE ruled out  Calcified Brain mass likely Meningioma    Plan:  Patient with a doubtful CT Angio but essentially low probability PE; Hold off therapeutic anticoagulation  May give Prophylactic Lovenox if okay with Neurology  Discussed with Neuro; consult noted; await MRI with and without contrast to delineate mass further if truly a meningioma.   Encourage oral fluids  Cardiology evaluation d/w Dr. dueñas  Rest of [facility] as per PGY1 note above  Discussed with Wife Guillaume over the phone; updated clinical status and plan of care and possible discharge home if remain medically stable and WBC trending down.    Discussed with housestaff

## 2020-03-19 NOTE — PROGRESS NOTE ADULT - SUBJECTIVE AND OBJECTIVE BOX
Time of Visit:  Patient seen and examined.     MEDICATIONS  (STANDING):  nicotine -   7 mG/24Hr(s) Patch 1 patch Transdermal daily  sodium chloride 0.9%. 1000 milliLiter(s) (125 mL/Hr) IV Continuous <Continuous>      MEDICATIONS  (PRN):  acetaminophen   Tablet .. 650 milliGRAM(s) Oral every 6 hours PRN Temp greater or equal to 38C (100.4F), Moderate Pain (4 - 6)       Medications up to date at time of exam.      PHYSICAL EXAMINATION:  Patient has no new complaints.  GENERAL: The patient is a well-developed, well-nourished, in no apparent distress.     Vital Signs Last 24 Hrs  T(C): 36.5 (19 Mar 2020 16:02), Max: 37.6 (18 Mar 2020 19:46)  T(F): 97.7 (19 Mar 2020 16:02), Max: 99.7 (18 Mar 2020 19:46)  HR: 99 (19 Mar 2020 16:02) (77 - 99)  BP: 119/68 (19 Mar 2020 16:02) (105/65 - 119/68)  BP(mean): --  RR: 18 (19 Mar 2020 16:02) (18 - 18)  SpO2: 99% (19 Mar 2020 16:02) (95% - 99%)   (if applicable)    Chest Tube (if applicable)    HEENT: Head is normocephalic and atraumatic. Extraocular muscles are intact. Mucous membranes are moist.     NECK: Supple, no palpable adenopathy.    LUNGS: Clear to auscultation, no wheezing, rales, or rhonchi.    HEART: Regular rate and rhythm without murmur.    ABDOMEN: Soft, nontender, and nondistended.  No hepatosplenomegaly is noted.    : No painful voiding, no pelvic pain    EXTREMITIES: Without any cyanosis, clubbing, rash, lesions or edema.    NEUROLOGIC: Awake, alert, oriented, grossly intact    SKIN: Warm, dry, good turgor.      LABS:                        9.3    15.46 )-----------( 115      ( 19 Mar 2020 08:20 )             28.4     03-    142  |  115<H>  |  15  ----------------------------<  110<H>  3.9   |  22  |  1.05    Ca    7.6<L>      19 Mar 2020 08:20  Phos  2.4     03-19  Mg     1.7     03-18    TPro  6.0  /  Alb  3.1<L>  /  TBili  0.3  /  DBili  x   /  AST  36  /  ALT  29  /  AlkPhos  42  03-19    PT/INR - ( 18 Mar 2020 08:33 )   PT: 12.3 sec;   INR: 1.09 ratio         PTT - ( 18 Mar 2020 08:33 )  PTT:37.3 sec  Urinalysis Basic - ( 18 Mar 2020 03:26 )    Color: Yellow / Appearance: Clear / S.010 / pH: x  Gluc: x / Ketone: Negative  / Bili: Negative / Urobili: Negative   Blood: x / Protein: Negative / Nitrite: Negative   Leuk Esterase: Negative / RBC: 2-5 /HPF / WBC 0-2 /HPF   Sq Epi: x / Non Sq Epi: Few /HPF / Bacteria: Few /HPF        CARDIAC MARKERS ( 18 Mar 2020 05:55 )  <0.015 ng/mL / x     / 217 U/L / x     / <1.0 ng/mL  CARDIAC MARKERS ( 17 Mar 2020 19:59 )  <0.015 ng/mL / x     / x     / x     / x                    MICROBIOLOGY: (if applicable)    RADIOLOGY & ADDITIONAL STUDIES:  EKG:   V/Q scan:< from: NM Pulmonary Ventilation Scan (20 @ 10:47) >    EXAM:  NM PULM VENTILATION IMG                            PROCEDURE DATE:  2020          INTERPRETATION:  CLINICAL INFORMATION: 60 year-old male with syncope, chest pain, referred to evaluate for pulmonary embolism.    RADIOPHARMACEUTICAL: 1 mCi Tc-99m-DTPA by inhalation; 6.0 mCi Tc-99m-MAA, I.V.    TECHNIQUE:  Ventilation and perfusion images of the lungs were obtained following administration of Tc-99m-DTPA and Tc-99m-MAA. Images were obtained in the anterior, posterior, both lateral, and all 4 oblique projections. This study was interpreted in conjunction with a chest radiograph of 3/18/2020    COMPARISON: No previous diabetes scan for comparison     FINDINGS: There is matched defect in the apical segment of the right upper lobe with no corresponding chest x-ray opacity. There is mildly heterogeneous radiotracer distribution in the lungs on both the ventilation and the perfusion images.       IMPRESSION: Abnormal ventilation/perfusion lung scan. Low probability of pulmonary embolus.                            ROBB GRAF M.D., NUCLEAR MEDICINE ATTENDING  This document has been electronically signed. Mar 19 2020 11:20AM                < end of copied text >    ECHO:    IMPRESSION: 60y Male PAST MEDICAL & SURGICAL HISTORY:  Hypercholesterolemia  S/P appendectomy: 10 years ago   p/w           IMP: This is a 60 yr old man , active smoker, HLD presented with syncope. CT head ? sella mass.  CTPA show small RUL PE, i doubt this is the etiology of syncope . There is b/l mediastinal adenopathy with cystic b/l upper lobe lesion due to smoke exposure vs sarcoidosis. . V/Q scan is low probability for PE.      -B/L mediastinal hilar adenopathy  -ACE level pending   -PE unlikely      Sugg:  -repeat CT with contrast in 3-4 months ( all elective procedures cancelled due to COVID-19 pandemic)  -symptomatic anemia  -syncope work up  -f/u echo  -malignancy work up  -MRI/MRA of brain  -neuro eval  -no need for anticoag for PE  -out pat pul f/u with my office  -out pat PFT  -advise to stop smoking   -nicotine patch 7      case discussed with Dr Guardado  left voice message with wife Guillaume [phone]

## 2020-03-19 NOTE — DISCHARGE NOTE PROVIDER - NSDCMRMEDTOKEN_GEN_ALL_CORE_FT
simvastatin 10 mg oral tablet: 1 tab(s) orally once a day (at bedtime) ergocalciferol 50,000 intl units (1.25 mg) oral capsule: 1 cap(s) orally once a week  nicotine 7 mg/24 hr transdermal film, extended release: 1 patch transdermal once a day  simvastatin 10 mg oral tablet: 1 tab(s) orally once a day (at bedtime) ergocalciferol 50,000 intl units (1.25 mg) oral capsule: 1 cap(s) orally once a week  simvastatin 10 mg oral tablet: 1 tab(s) orally once a day (at bedtime) ergocalciferol 50,000 intl units (1.25 mg) oral capsule: 1 cap(s) orally once a week  hydrocortisone 5 mg oral tablet: 2 tab(s) orally at 8am and 1 tab orally at 4pm  levothyroxine 50 mcg (0.05 mg) oral tablet: 1 tab(s) orally once a day  simvastatin 10 mg oral tablet: 1 tab(s) orally once a day (at bedtime)

## 2020-03-19 NOTE — PROGRESS NOTE ADULT - SUBJECTIVE AND OBJECTIVE BOX
PGY 1 Note discussed with supervising resident and primary attending    Patient is a 60y old  Male who presents with a chief complaint of syncopal episodes x 2 (18 Mar 2020 18:46)      INTERVAL HPI/OVERNIGHT EVENTS: offers no new complaints; current symptoms resolving, states he feels dizzy on standing     MEDICATIONS  (STANDING):  nicotine -   7 mG/24Hr(s) Patch 1 patch Transdermal daily  sodium chloride 0.9%. 1000 milliLiter(s) (125 mL/Hr) IV Continuous <Continuous>    MEDICATIONS  (PRN):  acetaminophen   Tablet .. 650 milliGRAM(s) Oral every 6 hours PRN Temp greater or equal to 38C (100.4F), Moderate Pain (4 - 6)      __________________________________________________  REVIEW OF SYSTEMS:    CONSTITUTIONAL: No fever,   EYES: no acute visual disturbances  NECK: No pain or stiffness  RESPIRATORY: No cough; No shortness of breath  CARDIOVASCULAR: No chest pain, no palpitations  GASTROINTESTINAL: No pain. No nausea or vomiting; No diarrhea   NEUROLOGICAL: No headache or numbness, no tremors  MUSCULOSKELETAL: No joint pain, no muscle pain  GENITOURINARY: no dysuria, no frequency, no hesitancy  PSYCHIATRY: no depression , no anxiety  ALL OTHER  ROS negative        Vital Signs Last 24 Hrs  T(C): 37.1 (19 Mar 2020 04:31), Max: 37.6 (18 Mar 2020 19:46)  T(F): 98.7 (19 Mar 2020 04:31), Max: 99.7 (18 Mar 2020 19:46)  HR: 77 (19 Mar 2020 04:31) (77 - 85)  BP: 106/58 (19 Mar 2020 04:31) (93/61 - 119/67)  BP(mean): --  RR: 18 (19 Mar 2020 04:31) (18 - 18)  SpO2: 95% (19 Mar 2020 04:31) (95% - 100%)    ________________________________________________  PHYSICAL EXAM:  GENERAL: NAD  HEENT: Normocephalic;  conjunctivae and sclerae clear; moist mucous membranes;   NECK : supple  CHEST/LUNG: Clear to auscultation bilaterally with good air entry   HEART: S1 S2  regular; no murmurs, gallops or rubs  ABDOMEN: Soft, Nontender, Nondistended; Bowel sounds present  EXTREMITIES: no cyanosis; no edema; no calf tenderness  SKIN: warm and dry; no rash  NERVOUS SYSTEM:  : Awake, alert, oriented x 4, language fluent, comprehension intact, naming intact, repetition intact, normal affect    _________________________________________________  LABS:                        9.3    15.46 )-----------( 115      ( 19 Mar 2020 08:20 )             28.4     03-19    142  |  115<H>  |  15  ----------------------------<  110<H>  3.9   |  22  |  1.05    Ca    7.6<L>      19 Mar 2020 08:20  Phos  2.4     -19  Mg     1.7     -18    TPro  6.0  /  Alb  3.1<L>  /  TBili  0.3  /  DBili  x   /  AST  36  /  ALT  29  /  AlkPhos  42  03-19    PT/INR - ( 18 Mar 2020 08:33 )   PT: 12.3 sec;   INR: 1.09 ratio         PTT - ( 18 Mar 2020 08:33 )  PTT:37.3 sec  Urinalysis Basic - ( 18 Mar 2020 03:26 )    Color: Yellow / Appearance: Clear / S.010 / pH: x  Gluc: x / Ketone: Negative  / Bili: Negative / Urobili: Negative   Blood: x / Protein: Negative / Nitrite: Negative   Leuk Esterase: Negative / RBC: 2-5 /HPF / WBC 0-2 /HPF   Sq Epi: x / Non Sq Epi: Few /HPF / Bacteria: Few /HPF      CAPILLARY BLOOD GLUCOSE            RADIOLOGY & ADDITIONAL TESTS:  < from: CT Angio Chest w/ IV Cont (20 @ 11:27) >  Small filling defect in a right upper lobe segmental pulmonary artery, compatible with pulmonary embolism.    Small bilateral pleural effusions.    Possible mural thickening at the upper esophagus; EGD may be pursued for further evaluation.    Nonspecific mildly enlarged mediastinal and bihilar lymph nodes.    Interlobular septal thickening in both lungs, suggestive of interstitial pulmonary edema.    Small right lung nodules as described above. If the patient is in the high risk category (i.e. smoker), follow-up chest CT may be pursued in 12 months to ensure stability.    < end of copied text >    < from: CT Head No Cont (20 @ 18:57) >    Impression: No acute intracranial hemorrhage.     Apparent hyperdensity in the left intracranial ICA just proximal to the bifurcation. This may be artifactual or due to presence of embolus. If clinically indicated, CTA may be pursued for further evaluation.    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 If clinically indicated, MR may be pursued for further evaluation.    < end of copied text >    Imaging Personally Reviewed:  YES/NO    Consultant(s) Notes Reviewed:   YES/ No    Care Discussed with Consultants :     Plan of care was discussed with patient and /or primary care giver; all questions and concerns were addressed and care was aligned with patient's wishes.

## 2020-03-19 NOTE — CONSULT NOTE ADULT - SUBJECTIVE AND OBJECTIVE BOX
HISTORY OF PRESENT ILLNESS: HPI:  60 years old male  PMHx of HLP (on simvastatin) , presents to the ED with complaints of unwitnessed syncopal episode x2. Patient reports he ate some pizza and then vomited. Notes when he got up, he felt a room spinning sensation, ear ringing and passed out. Notes no other symptoms after passing out. Patient states he then again had a second syncopal episode when he got up quickly from the first. Notes his has happened in the past which improved with rehydration. Patient wife states that  second episode was witnessed by her. Patient LOC was 1-2 minutes and patient was asymptomatic after the episode. Patient endorses mild upset stomach. Patient denies incontinence, chest pain, shortness of breath, fever, cough, burning urination or any other symptoms.  Patient is being admitted for syncopal workup most likely vasovagal syncope. EKG is negative for any acute ischemic changes, troponin T1 is negative. CT head did show a questionable emboli. Will admit to telemetry to rule out stroke. No neurological changes on examination. Offered CT angio but he refused because of ? allergy last time he had CT scan.  Interval history: While trying to get orthostatic, he felt dizzy and his blood pressure dropped. s/p 1 L bolus followed by aggressive IV hydration.   Allergies: Dye/contrast: difficulty breathing. (17 Mar 2020 23:24)  He denies CP, SOB no known CAD.  Has'nt had a cardiac evaluation in 15 years      PAST MEDICAL & SURGICAL HISTORY:  Hypercholesterolemia  S/P appendectomy: 10 years ago          MEDICATIONS:  MEDICATIONS  (STANDING):  nicotine -   7 mG/24Hr(s) Patch 1 patch Transdermal daily  sodium chloride 0.9%. 1000 milliLiter(s) (125 mL/Hr) IV Continuous <Continuous>      Allergies    No Known Allergies    Intolerances        FAMILY HISTORY:    Non-contributary for premature coronary disease or sudden cardiac death    SOCIAL HISTORY:    [ ] Non-smoker  [ X] Smoker  [ ] Alcohol        REVIEW OF SYSTEMS:  [ ]chest pain  [  ]shortness of breath  [  ]palpitations  [X  ]syncope  [ ]near syncope [ ]upper extremity weakness   [ ] lower extremity weakness  [  ]diplopia  [  ]altered mental status   [  ]fevers  [ ]chills [ ]nausea  [ ]vomitting  [  ]dysphagia    [ ]abdominal pain  [ ]melena  [ ]BRBPR    [  ]epistaxis  [  ]rash    [ ]lower extremity edema        [ X] All others negative	  [ ] Unable to obtain      LABS:	 	    CARDIAC MARKERS:  CARDIAC MARKERS ( 18 Mar 2020 05:55 )  <0.015 ng/mL / x     / 217 U/L / x     / <1.0 ng/mL  CARDIAC MARKERS ( 17 Mar 2020 19:59 )  <0.015 ng/mL / x     / x     / x     / x                                  9.3    15.46 )-----------( 115      ( 19 Mar 2020 08:20 )             28.4     Hb Trend: 9.3<--    03-19    142  |  115<H>  |  15  ----------------------------<  110<H>  3.9   |  22  |  1.05    Ca    7.6<L>      19 Mar 2020 08:20  Phos  2.4     03-19  Mg     1.7     03-18    TPro  6.0  /  Alb  3.1<L>  /  TBili  0.3  /  DBili  x   /  AST  36  /  ALT  29  /  AlkPhos  42  03-19    Creatinine Trend: 1.05<--, 0.98<--, 1.31<--    PHYSICAL EXAM:  T(C): 36.5 (03-19-20 @ 16:02), Max: 37.6 (03-18-20 @ 19:46)  HR: 99 (03-19-20 @ 16:02) (77 - 99)  BP: 119/68 (03-19-20 @ 16:02) (105/65 - 119/68)  RR: 18 (03-19-20 @ 16:02) (18 - 18)  SpO2: 99% (03-19-20 @ 16:02) (95% - 99%)  Wt(kg): --   BMI (kg/m2): 23.4 (03-17-20 @ 17:20)  I&O's Summary    19 Mar 2020 07:01  -  19 Mar 2020 17:57  --------------------------------------------------------  IN: 750 mL / OUT: 0 mL / NET: 750 mL    Gen: Appears well in NAD  HEENT:  (-)icterus (-)pallor  CV: N S1 S2 1/6 KELLY (+)2 Pulses B/l  Resp:  Clear to ausculatation B/L, normal effort  GI: (+) BS Soft, NT, ND  Lymph:  (-)Edema, (-)obvious lymphadenopathy  Skin: Warm to touch, Normal turgor  Psych: Appropriate mood and affect        TELEMETRY: 	  Sinus    ECG:  	sinus 85 BPM, low voltage QRS    RADIOLOGY:         CXR:   < from: Xray Chest 2 Views PA/Lat (03.17.20 @ 18:37) >  No active disease. Bilateral apical scarring may represent old granulomatous disease and is similar to the prior exam    < end of copied text >      ASSESSMENT/PLAN: 	60y Male PMHx of HLP (on simvastatin) , presents to the ED with complaints of unwitnessed syncopal episode x2.     - Cont IVF  - Orthostatic BP  - If no pertinent findings on echo plan for stress echo in AM    I once again thank you for allowing me to participate in the care of your patient.  If you have any questions or concerns please do not hesitate to contact me.    Víctor Mina MD, [facility]  BEEPER [phone] HISTORY OF PRESENT ILLNESS: HPI:  60 years old male  PMHx of HLP (on simvastatin) , presents to the ED with complaints of unwitnessed syncopal episode x2. Patient reports he ate some pizza and then vomited. Notes when he got up, he felt a room spinning sensation, ear ringing and passed out. Notes no other symptoms after passing out. Patient states he then again had a second syncopal episode when he got up quickly from the first. Notes his has happened in the past which improved with rehydration. Patient wife states that  second episode was witnessed by her. Patient LOC was 1-2 minutes and patient was asymptomatic after the episode. Patient endorses mild upset stomach. Patient denies incontinence, chest pain, shortness of breath, fever, cough, burning urination or any other symptoms.  Patient is being admitted for syncopal workup most likely vasovagal syncope. EKG is negative for any acute ischemic changes, troponin T1 is negative. CT head did show a questionable emboli. Will admit to telemetry to rule out stroke. No neurological changes on examination. Offered CT angio but he refused because of ? allergy last time he had CT scan.  Interval history: While trying to get orthostatic, he felt dizzy and his blood pressure dropped. s/p 1 L bolus followed by aggressive IV hydration.   Allergies: Dye/contrast: difficulty breathing. (17 Mar 2020 23:24)  He denies CP, SOB no known CAD.  Has'nt had a cardiac evaluation in 15 years      PAST MEDICAL & SURGICAL HISTORY:  Hypercholesterolemia  S/P appendectomy: 10 years ago          MEDICATIONS:  MEDICATIONS  (STANDING):  nicotine -   7 mG/24Hr(s) Patch 1 patch Transdermal daily  sodium chloride 0.9%. 1000 milliLiter(s) (125 mL/Hr) IV Continuous <Continuous>      Allergies    No Known Allergies    Intolerances        FAMILY HISTORY:    Non-contributary for premature coronary disease or sudden cardiac death    SOCIAL HISTORY:    [ ] Non-smoker  [ X] Smoker  [ ] Alcohol        REVIEW OF SYSTEMS:  [ ]chest pain  [  ]shortness of breath  [  ]palpitations  [X  ]syncope  [ ]near syncope [ ]upper extremity weakness   [ ] lower extremity weakness  [  ]diplopia  [  ]altered mental status   [  ]fevers  [ ]chills [ ]nausea  [ ]vomitting  [  ]dysphagia    [ ]abdominal pain  [ ]melena  [ ]BRBPR    [  ]epistaxis  [  ]rash    [ ]lower extremity edema        [ X] All others negative	  [ ] Unable to obtain      LABS:	 	    CARDIAC MARKERS:  CARDIAC MARKERS ( 18 Mar 2020 05:55 )  <0.015 ng/mL / x     / 217 U/L / x     / <1.0 ng/mL  CARDIAC MARKERS ( 17 Mar 2020 19:59 )  <0.015 ng/mL / x     / x     / x     / x                                  9.3    15.46 )-----------( 115      ( 19 Mar 2020 08:20 )             28.4     Hb Trend: 9.3<--    03-19    142  |  115<H>  |  15  ----------------------------<  110<H>  3.9   |  22  |  1.05    Ca    7.6<L>      19 Mar 2020 08:20  Phos  2.4     03-19  Mg     1.7     03-18    TPro  6.0  /  Alb  3.1<L>  /  TBili  0.3  /  DBili  x   /  AST  36  /  ALT  29  /  AlkPhos  42  03-19    Creatinine Trend: 1.05<--, 0.98<--, 1.31<--    PHYSICAL EXAM:  T(C): 36.5 (03-19-20 @ 16:02), Max: 37.6 (03-18-20 @ 19:46)  HR: 99 (03-19-20 @ 16:02) (77 - 99)  BP: 119/68 (03-19-20 @ 16:02) (105/65 - 119/68)  RR: 18 (03-19-20 @ 16:02) (18 - 18)  SpO2: 99% (03-19-20 @ 16:02) (95% - 99%)  Wt(kg): --   BMI (kg/m2): 23.4 (03-17-20 @ 17:20)  I&O's Summary    19 Mar 2020 07:01  -  19 Mar 2020 17:57  --------------------------------------------------------  IN: 750 mL / OUT: 0 mL / NET: 750 mL    Gen: Appears well in NAD  HEENT:  (-)icterus (-)pallor  CV: N S1 S2 1/6 KELLY (+)2 Pulses B/l  Resp:  Clear to ausculatation B/L, normal effort  GI: (+) BS Soft, NT, ND  Lymph:  (-)Edema, (-)obvious lymphadenopathy  Skin: Warm to touch, Normal turgor  Psych: Appropriate mood and affect        TELEMETRY: 	  Sinus    ECG:  	sinus 85 BPM, low voltage QRS    RADIOLOGY:         CXR:   < from: Xray Chest 2 Views PA/Lat (03.17.20 @ 18:37) >  No active disease. Bilateral apical scarring may represent old granulomatous disease and is similar to the prior exam    < end of copied text >      ASSESSMENT/PLAN: 	60y Male PMHx of HLP (on simvastatin) , presents to the ED with complaints of unwitnessed syncopal episode x2.     - Cont IVF  - Orthostatic BP  - If no pertinent findings on echo plan for stress echo in AM  - Smoking cessation stressed    I once again thank you for allowing me to participate in the care of your patient.  If you have any questions or concerns please do not hesitate to contact me.    Víctor Mina MD, [facility]  BEEPER [phone]

## 2020-03-19 NOTE — PROGRESS NOTE ADULT - PROBLEM SELECTOR PLAN 2
24H TMAX 101.9  BP improved with IV hydration  Lactic 0.5  WBC 19.97-> 15 K   CXR & UA negative, no s/s GI upset  Blood cultures pending   Urine cultures NGTD  No antibiotics for now. ED on admission spiked  101.9-> remains afebrile now  BP improved with IV hydration  Lactic 0.5  WBC 19.97-> 15 K   CXR & UA negative, no s/s GI upset  Blood cultures NGTD  Urine cultures NGTD  No antibiotics for now.

## 2020-03-19 NOTE — DISCHARGE NOTE PROVIDER - NSDCCPCAREPLAN_GEN_ALL_CORE_FT
PRINCIPAL DISCHARGE DIAGNOSIS  Diagnosis: Syncope and collapse  Assessment and Plan of Treatment:       SECONDARY DISCHARGE DIAGNOSES  Diagnosis: Brain mass  Assessment and Plan of Treatment:     Diagnosis: Pulmonary embolism  Assessment and Plan of Treatment:     Diagnosis: Hypercholesterolemia  Assessment and Plan of Treatment: Continue with cholesterol medications. Maintain a healthy diet that consist of low sugar, low fat, low sodium diet. Exercise frequently if possible.  Follow up with primary care physician in one week after discharge.  Diet suggested: DASH Diet that Emphasizes vegetables, fruits, and fat-free or low-fat dairy products. Includes whole grains, fish, poultry, beans, seeds, nuts, and vegetable oils. Limits sodium, sweets, sugary beverages, and red meats.      Diagnosis: Sepsis  Assessment and Plan of Treatment: PRINCIPAL DISCHARGE DIAGNOSIS  Diagnosis: Syncope and collapse  Assessment and Plan of Treatment:       SECONDARY DISCHARGE DIAGNOSES  Diagnosis: Brain mass  Assessment and Plan of Treatment: You were noted to have supersellar mass on headt CT. Neurologist was consulted and he recommended MRI which showed adenoma. We    Diagnosis: Pulmonary embolism  Assessment and Plan of Treatment:     Diagnosis: Hypercholesterolemia  Assessment and Plan of Treatment: Continue with cholesterol medications. Maintain a healthy diet that consist of low sugar, low fat, low sodium diet. Exercise frequently if possible.  Follow up with primary care physician in one week after discharge.  Diet suggested: DASH Diet that Emphasizes vegetables, fruits, and fat-free or low-fat dairy products. Includes whole grains, fish, poultry, beans, seeds, nuts, and vegetable oils. Limits sodium, sweets, sugary beverages, and red meats.      Diagnosis: Sepsis  Assessment and Plan of Treatment: PRINCIPAL DISCHARGE DIAGNOSIS  Diagnosis: Syncope and collapse  Assessment and Plan of Treatment: You came in after an episode of  syncope and collapse  You were admitted to exclude any neurological or cardiological causes.   ECHO and stress test were normal with EF> 55%   You were found to have Orthostatic hypotension on admission and improved after IV lfuids were given.  You are recommended to increase your fluids intake and maintain adequate hydration and do not rise from a seated position too quickly, as this could cause your blood pressure to drop (Sit for 2-3 minutes before standing or walking from lying down position)  You are advised to follow up with and PCP in 1 week.        SECONDARY DISCHARGE DIAGNOSES  Diagnosis: Vitamin D insufficiency  Assessment and Plan of Treatment: You was found to have low Vitamin D levels. We will replace weekly 50,000 units for 8 weeks and then repeat with PCP. Once levels are more than 30, your PCP might switch you to daily maintenance dose 1000 to 2000 units daily.      Diagnosis: Brain mass  Assessment and Plan of Treatment: You were admitted for syncope work up Syncope and collapse. CT Head noted for Apparent sellar mass with calcifications with suprasellar extension.  Dr. Myles (Neurology) consulted and recommended MR head w contrast which showed There is an enhancing mass filling and expanding the sella turcica. It measures 1.5 cm and caudad dimension, 1.2 cm in AP dimension and 1.5 cm transversely. It encroaches on and elevates the optic chiasm.   Endocrinologist was consulted as cortisol levels done on routine earlier showed it was low. Repeat cortisol and ACTH levels were tested and showed XXX. Pt will need follow up with endocrinologist after discharge to complete further test for pituitary panel. you will also need to see ophthalmologist as the mass is right above optic chiasm.    Diagnosis: Pulmonary embolism  Assessment and Plan of Treatment: CTA chest noted for Small filling defect in a right upper lobe segmental pulmonary artery, compatible with pulmonary embolism. Small bilateral pleural effusions. This was followd by V/Q scan which showed low probabilty for pulmonary embolism. You were not started on anticoagulation. Pulmonologist was consulted and recommended outpatient for up for Pulmonary function test and repeat CT in 4-6 months as there were concerns for pulmonary nodules seen on CT scan. Please schedule an appointment with pulmonologist after discharge.    Diagnosis: Tobacco use disorder  Assessment and Plan of Treatment: You have been counseled regarding the dangers of smoking and benefits of smoking cessation. You stated that you quit smoking and do no require any patches for tobacco cessation    Diagnosis: Hypercholesterolemia  Assessment and Plan of Treatment: Continue with cholesterol medications. Maintain a healthy diet that consist of low sugar, low fat, low sodium diet. Exercise frequently if possible.  Follow up with primary care physician in one week after discharge.  Diet suggested: DASH Diet that Emphasizes vegetables, fruits, and fat-free or low-fat dairy products. Includes whole grains, fish, poultry, beans, seeds, nuts, and vegetable oils. Limits sodium, sweets, sugary beverages, and red meats.      Diagnosis: Sepsis  Assessment and Plan of Treatment: You were noted on admission to have a temperature of 101.9 F which was later noted to be normal  Your blood pressure was low but improved with IV hydration. WBC 19.97. chest xray and urine analysis was negative. WBC remained elevated despite a source of infection. Blood and urine cultures were negative and no antibiotics were stared. Please follow up with your PCP after discharge to repeat CBC in 1 week PRINCIPAL DISCHARGE DIAGNOSIS  Diagnosis: Syncope and collapse  Assessment and Plan of Treatment: You came in after an episode of  passing out.   You were admitted to exclude any neurological or cardiological causes.   ECHO (Sonogram of the heart)  and stress test (Stress echo) were   You were found to have Orthostatic hypotension on admission and improved after Intravenous fluids were given.  You are recommended to increase your fluids intake and maintain adequate hydration and do not rise from a seated position too quickly, as this could cause your blood pressure to drop (Sit for couple minutes before standing or walking from lying down position)  You are advised to follow up with and PCP in 1 week.  Your syncope could also be contributed by underlying Pituitary hormone deficiency as you was found to have Pituitary adenoma.      SECONDARY DISCHARGE DIAGNOSES  Diagnosis: Adrenal insufficiency  Assessment and Plan of Treatment: Endocrinologist Dr. Abarca was consulted as cortisol levels done on routine earlier showed it was low and also brain lesion turned out to be Pituitary Adenoma. Repeat cortisol and ACTH levels were tested and showed inappropriate Cortisol response to ACTH hormone administtration suggesting pitutiary insufficiency (Central Hypopituitarism). Endocrinologist recommended placing you on hydrocortisone 10 mg in the morning at 8 AM and 5 mg in the evening at 4 PM. You was also noted to have a normal TSH level with low T4 level again consistent with pituitary insufficiency. You was placed on Thyroid hormone replacement with Synthroid 50 microgm every morning before breakfast. We have also send other hormone levels which Dr. Abarca will follow up as outpatient in next 2 weeks.    Pt will need follow up with endocrinologist after discharge to complete further test for pituitary panel. you will also need to see ophthalmologist as the mass is right above optic chiasm.  Please note that you will need a higher dose of stress steroids if you go for any surgical intervention or if you have any Sepsis from infection in the future. Please let your docotr know thay you are taking Hydrocortisone.    Diagnosis: Vitamin D insufficiency  Assessment and Plan of Treatment: You was found to have low Vitamin D levels. We will replace weekly 50,000 units for 8 weeks and then repeat with PCP. Once levels are more than 30, your PCP might switch you to daily maintenance dose 1000 to 2000 units daily.      Diagnosis: Tobacco use disorder  Assessment and Plan of Treatment: You have been counseled regarding the dangers of smoking and benefits of smoking cessation. You stated that you quit smoking and do no require any patches for tobacco cessation    Diagnosis: Brain mass  Assessment and Plan of Treatment: You were admitted for syncope work up Syncope and collapse. CT Head noted for Apparent sellar mass with calcifications with suprasellar extension  Dr. Myles (Neurology) consulted and recommended MR head w contrast to evalauate for possible Meningioma. MRI of Brain showed there is an enhancing mass filling and expanding the sella turcica consistent with Pituitary Adenoma. It measures 1.5 cm and caudad dimension, 1.2 cm in AP dimension and 1.5 cm transversely. It encroaches on and elevates the optic chiasm.  Endocrinologist was consulted as cortisol levels done on routine earlier showed it was low. Repeat cortisol and ACTH levels were tested and showed XXX. Pt will need follow up with endocrinologist after discharge to complete further test for pituitary panel. you will also need to see ophthalmologist as the mass is right above optic chiasm.    Diagnosis: Pulmonary embolism  Assessment and Plan of Treatment: CTA chest noted for Small filling defect in a right upper lobe segmental pulmonary artery, compatible with pulmonary embolism. Small bilateral pleural effusions. Clinically there was no evidenc eof PE. Pulmonary consulted Dr. Harper who recommended V/Q scan which showed low probabilty for pulmonary embolism. You were not started on anticoagulation. Pulmonologist was consulted and recommended outpatient for up for Pulmonary function test and repeat CT in 4-6 months as there were concerns for pulmonary nodules seen on CT scan. Please schedule an appointment with pulmonologist after discharge.    Diagnosis: Hypercholesterolemia  Assessment and Plan of Treatment: Continue with cholesterol medications. Maintain a healthy diet that consist of low sugar, low fat, low sodium diet. Exercise frequently if possible.  Follow up with primary care physician in one week after discharge.  Diet suggested: DASH Diet that Emphasizes vegetables, fruits, and fat-free or low-fat dairy products. Includes whole grains, fish, poultry, beans, seeds, nuts, and vegetable oils. Limits sodium, sweets, sugary beverages, and red meats.      Diagnosis: Sepsis  Assessment and Plan of Treatment: You were noted on admission to have a temperature of 101.9 F which was later noted to be normal  Your blood pressure was low but improved with IV hydration. WBC 19.97. chest xray and urine analysis was negative. WBC remained elevated despite a source of infection. Blood and urine cultures were negative and no antibiotics were stared. Please follow up with your PCP after discharge to repeat CBC in 1 week

## 2020-03-19 NOTE — DISCHARGE NOTE PROVIDER - CARE PROVIDERS DIRECT ADDRESSES
,DirectAddress_Unknown,[email or web address],DirectAddress_Unknown ,DirectAddress_Unknown,[email or web address],DirectAddress_Unknown,DirectAddress_Unknown ,DirectAddress_Unknown,DirectAddress_Unknown,DirectAddress_Unknown,[email or web address]

## 2020-03-19 NOTE — DISCHARGE NOTE PROVIDER - NSDCFUADDINST_GEN_ALL_CORE_FT
FOLLOW UP WITH ENDOCRINOLOGIST DR. NICHOLS AS ADVISED IN 7 TO 10 DAYS    FOLLOW UP WITH PULMONARY DR. MARRERO AS ADVISED IN 4 WEEKS.     FOLLOW UP WITH NEUROLOGIST AS ADVISED

## 2020-03-20 LAB
ACE SERPL-CCNC: 48 U/L — SIGNIFICANT CHANGE UP (ref 14–82)
ANION GAP SERPL CALC-SCNC: 7 MMOL/L — SIGNIFICANT CHANGE UP (ref 5–17)
BUN SERPL-MCNC: 18 MG/DL — SIGNIFICANT CHANGE UP (ref 7–18)
CALCIUM SERPL-MCNC: 8.2 MG/DL — LOW (ref 8.4–10.5)
CHLORIDE SERPL-SCNC: 113 MMOL/L — HIGH (ref 96–108)
CO2 SERPL-SCNC: 24 MMOL/L — SIGNIFICANT CHANGE UP (ref 22–31)
CORTIS AM PEAK SERPL-MCNC: 0.8 UG/DL — LOW (ref 6–18.4)
CREAT SERPL-MCNC: 1.18 MG/DL — SIGNIFICANT CHANGE UP (ref 0.5–1.3)
GLUCOSE SERPL-MCNC: 76 MG/DL — SIGNIFICANT CHANGE UP (ref 70–99)
HCT VFR BLD CALC: 27.7 % — LOW (ref 39–50)
HGB BLD-MCNC: 9 G/DL — LOW (ref 13–17)
MAGNESIUM SERPL-MCNC: 2.5 MG/DL — SIGNIFICANT CHANGE UP (ref 1.6–2.6)
MCHC RBC-ENTMCNC: 29.7 PG — SIGNIFICANT CHANGE UP (ref 27–34)
MCHC RBC-ENTMCNC: 32.5 GM/DL — SIGNIFICANT CHANGE UP (ref 32–36)
MCV RBC AUTO: 91.4 FL — SIGNIFICANT CHANGE UP (ref 80–100)
NRBC # BLD: 0 /100 WBCS — SIGNIFICANT CHANGE UP (ref 0–0)
PHOSPHATE SERPL-MCNC: 2.7 MG/DL — SIGNIFICANT CHANGE UP (ref 2.5–4.5)
PLATELET # BLD AUTO: 86 K/UL — LOW (ref 150–400)
POTASSIUM SERPL-MCNC: 4.3 MMOL/L — SIGNIFICANT CHANGE UP (ref 3.5–5.3)
POTASSIUM SERPL-SCNC: 4.3 MMOL/L — SIGNIFICANT CHANGE UP (ref 3.5–5.3)
PROLACTIN SERPL-MCNC: 13.6 NG/ML — SIGNIFICANT CHANGE UP (ref 4.1–18.4)
RBC # BLD: 3.03 M/UL — LOW (ref 4.2–5.8)
RBC # FLD: 14.2 % — SIGNIFICANT CHANGE UP (ref 10.3–14.5)
SODIUM SERPL-SCNC: 144 MMOL/L — SIGNIFICANT CHANGE UP (ref 135–145)
WBC # BLD: 17.83 K/UL — HIGH (ref 3.8–10.5)
WBC # FLD AUTO: 17.83 K/UL — HIGH (ref 3.8–10.5)

## 2020-03-20 PROCEDURE — 93970 EXTREMITY STUDY: CPT | Mod: 26

## 2020-03-20 PROCEDURE — 99233 SBSQ HOSP IP/OBS HIGH 50: CPT | Mod: GC

## 2020-03-20 PROCEDURE — 99223 1ST HOSP IP/OBS HIGH 75: CPT

## 2020-03-20 PROCEDURE — 99232 SBSQ HOSP IP/OBS MODERATE 35: CPT

## 2020-03-20 RX ORDER — NICOTINE POLACRILEX 2 MG
1 GUM BUCCAL
Qty: 30 | Refills: 0
Start: 2020-03-20 | End: 2020-04-18

## 2020-03-20 RX ORDER — ERGOCALCIFEROL 1.25 MG/1
50000 CAPSULE ORAL
Refills: 0 | Status: DISCONTINUED | OUTPATIENT
Start: 2020-03-20 | End: 2020-03-21

## 2020-03-20 RX ORDER — COSYNTROPIN 0.25 MG/ML
2.5 INJECTION, SOLUTION INTRAVENOUS ONCE
Refills: 0 | Status: DISCONTINUED | OUTPATIENT
Start: 2020-03-20 | End: 2020-03-20

## 2020-03-20 RX ORDER — ENOXAPARIN SODIUM 100 MG/ML
40 INJECTION SUBCUTANEOUS DAILY
Refills: 0 | Status: DISCONTINUED | OUTPATIENT
Start: 2020-03-20 | End: 2020-03-20

## 2020-03-20 RX ORDER — ERGOCALCIFEROL 1.25 MG/1
1 CAPSULE ORAL
Qty: 8 | Refills: 0
Start: 2020-03-20 | End: 2020-03-27

## 2020-03-20 RX ORDER — COSYNTROPIN 0.25 MG/ML
0.25 INJECTION, SOLUTION INTRAVENOUS ONCE
Refills: 0 | Status: COMPLETED | OUTPATIENT
Start: 2020-03-20 | End: 2020-03-20

## 2020-03-20 RX ADMIN — SODIUM CHLORIDE 125 MILLILITER(S): 9 INJECTION INTRAMUSCULAR; INTRAVENOUS; SUBCUTANEOUS at 06:59

## 2020-03-20 RX ADMIN — COSYNTROPIN 0.25 MILLIGRAM(S): 0.25 INJECTION, SOLUTION INTRAVENOUS at 17:12

## 2020-03-20 RX ADMIN — SODIUM CHLORIDE 125 MILLILITER(S): 9 INJECTION INTRAMUSCULAR; INTRAVENOUS; SUBCUTANEOUS at 23:31

## 2020-03-20 NOTE — PHYSICAL THERAPY INITIAL EVALUATION ADULT - COORDINATION ASSESSED, REHAB EVAL
negative dysmetria; negative lower extremity coordination issue/finger to nose/heel to shin negative dysmetria; negative lower extremity coordination issue/heel to shin/finger to nose

## 2020-03-20 NOTE — PROGRESS NOTE ADULT - ATTENDING COMMENTS
Patient seen and examined; Agree with PGY1 A/P above with editing as needed. My independent assessment, findings on exam, diagnosis and plan of care as listed below. Discussed with Dr. Marin.    61 y/o Male admitted with Syncope with leucocytosis trending down well with fluids, concern for possible PE with CT Angio showing possible RUL PE but doubt as per Pulmonary. No new complaints.     Vital Signs Last 24 Hrs  T(C): 37 (20 Mar 2020 16:12), Max: 37 (20 Mar 2020 16:12)  T(F): 98.6 (20 Mar 2020 16:12), Max: 98.6 (20 Mar 2020 16:12)  HR: 76 (20 Mar 2020 16:12) (69 - 91)  BP: 108/52 (20 Mar 2020 16:12) (96/68 - 108/52)  BP(mean): 78 (20 Mar 2020 13:20) (73 - 78)  RR: 18 (20 Mar 2020 16:12) (18 - 18)  SpO2: 97% (20 Mar 2020 16:12) (96% - 100%)    P/E:  Neuro: Alert, Awake, oriented x 3  CVS: S1S2 present  resp: BLAE+, No wheeze or rhonchi  GI: Soft, BS+, NT, ND  Extr; no edema or calf tenderness     Labs:  Cortisol AM, Serum (03.18.20 @ 14:08)    Cortisol AM, Serum: 1.6 ug/dL    < from: MR Head w/wo IV Cont (03.19.20 @ 18:36) >    There is trace scattered chronicmicrovascular ischemic change in the subcortical white matter without mass effect, cortical edema or hydrocephalus. There is no evidence of acute infarct or previous parenchymal hemorrhage. There is a developmental venous anomaly in the right temporal lobe. There is an enhancing mass filling and expanding the sella turcica. It measures 1.5 cm and caudad dimension, 1.2 cm in AP dimension and 1.5 cm transversely. It encroaches on and elevates the optic chiasm. The orbital contents and cerebellar tonsils are within normal limits.    IMPRESSION:    Sellar mass consistent with an adenoma as above    < end of copied text >             D/D:  Hypocortisolism concerning for AI  Syncope appears to be vasovagal related to dehydration  Leucocytosis likely reactive resolving  PE ruled out  Calcified Brain mass likely Meningioma    Plan:        Encourage oral fluids  Cardiology evaluation d/w Dr. dueañs  Rest of [facility] as per PGY1 note above  Discussed with Wife Guillaume over the phone; updated clinical status and plan of care and possible discharge home if remain medically stable and WBC trending down.    Discussed with housestaff Patient seen and examined; Agree with PGY1 A/P above with editing as needed. My independent assessment, findings on exam, diagnosis and plan of care as listed below. Discussed with Dr. Marin.    61 y/o Male admitted with Syncope with leucocytosis trending down well with fluids, concern for possible PE with CT Angio showing possible RUL PE but doubt as per Pulmonary. No new complaints. Patient ambulated with me around Nursing station. No difficulty breathing.  Patient nite dot have low cortisol and given syncope and orthostasis and underlying adenoma concern for adrenal insufficiency    Vital Signs Last 24 Hrs  T(C): 37 (20 Mar 2020 16:12), Max: 37 (20 Mar 2020 16:12)  T(F): 98.6 (20 Mar 2020 16:12), Max: 98.6 (20 Mar 2020 16:12)  HR: 76 (20 Mar 2020 16:12) (69 - 91)  BP: 108/52 (20 Mar 2020 16:12) (96/68 - 108/52)  BP(mean): 78 (20 Mar 2020 13:20) (73 - 78)  RR: 18 (20 Mar 2020 16:12) (18 - 18)  SpO2: 97% (20 Mar 2020 16:12) (96% - 100%)    P/E:  Neuro: Alert, Awake, oriented x 3  CVS: S1S2 present  resp: BLAE+, No wheeze or rhonchi  GI: Soft, BS+, NT, ND  Extr; no edema or calf tenderness     Labs:  Cortisol AM, Serum (03.18.20 @ 14:08)    Cortisol AM, Serum: 1.6 ug/dL    < from: MR Head w/wo IV Cont (03.19.20 @ 18:36) >    There is trace scattered chronicmicrovascular ischemic change in the subcortical white matter without mass effect, cortical edema or hydrocephalus. There is no evidence of acute infarct or previous parenchymal hemorrhage. There is a developmental venous anomaly in the right temporal lobe. There is an enhancing mass filling and expanding the sella turcica. It measures 1.5 cm and caudad dimension, 1.2 cm in AP dimension and 1.5 cm transversely. It encroaches on and elevates the optic chiasm. The orbital contents and cerebellar tonsils are within normal limits.    IMPRESSION:    Sellar mass consistent with an adenoma as above    < end of copied text >             D/D:  Hypocortisolism concerning for AI  Syncope appears to be vasovagal related to dehydration  Leucocytosis likely reactive resolving  PE ruled out  Calcified Brain mass likely Meningioma    Plan:        Encourage oral fluids  Cardiology evaluation d/w Dr. dueñas  [facility] of [facility] as per PGY1 note above  Discussed with Wife Guillaume over the phone; updated clinical status and plan of care and possible discharge home if remain medically stable and WBC trending down.    Discussed with housestaff Patient seen and examined; Agree with PGY1 A/P above with editing as needed. My independent assessment, findings on exam, diagnosis and plan of care as listed below. Discussed with Dr. Marin.    59 y/o Male admitted with Syncope with leucocytosis trending down well with fluids, concern for possible PE with CT Angio showing possible RUL PE but doubt as per Pulmonary. No new complaints. Patient ambulated with me around Nursing station. No difficulty breathing.  Patient noted to have low cortisol and given syncope and orthostasis and underlying adenoma concern for adrenal insufficiency    Vital Signs Last 24 Hrs  T(C): 37 (20 Mar 2020 16:12), Max: 37 (20 Mar 2020 16:12)  T(F): 98.6 (20 Mar 2020 16:12), Max: 98.6 (20 Mar 2020 16:12)  HR: 76 (20 Mar 2020 16:12) (69 - 91)  BP: 108/52 (20 Mar 2020 16:12) (96/68 - 108/52)  BP(mean): 78 (20 Mar 2020 13:20) (73 - 78)  RR: 18 (20 Mar 2020 16:12) (18 - 18)  SpO2: 97% (20 Mar 2020 16:12) (96% - 100%)    P/E:  Neuro: Alert, Awake, oriented x 3  CVS: S1S2 present  resp: BLAE+, No wheeze or rhonchi  GI: Soft, BS+, NT, ND  Extr; no edema or calf tenderness     Labs:  Cortisol AM, Serum (03.18.20 @ 14:08)    Cortisol AM, Serum: 1.6 ug/dL    < from: MR Head w/wo IV Cont (03.19.20 @ 18:36) >    There is trace scattered chronic microvascular ischemic change in the subcortical white matter without mass effect, cortical edema or hydrocephalus. There is no evidence of acute infarct or previous parenchymal hemorrhage. There is a developmental venous anomaly in the right temporal lobe. There is an enhancing mass filling and expanding the sella turcica. It measures 1.5 cm and caudad dimension, 1.2 cm in AP dimension and 1.5 cm transversely. It encroaches on and elevates the optic chiasm. The orbital contents and cerebellar tonsils are within normal limits.    IMPRESSION: Sellar mass consistent with an adenoma as above       D/D:  Hypocortisolism concerning for Adrenal Insufficiency  Syncope appears to be vasovagal related to dehydration  Leucocytosis likely reactive resolving  PE ruled out  Calcified Brain mass likely Meningioma    Plan:  Endocrine consult appreciated; d/w Dr. Abarca  given significantly low cortisol and syncope will at least get further work up and hormone levels in hospital as patient may have difficulty following up and getting all this done due to COVID 19 pandemic and shelter in place  ACTH stimulation test; Pituitary hormone levels  Encourage oral fluids  Cardiology evaluation d/w Dr. dueñas; Echo WNL; Stress Echo done negative for any ischemia  No nuclear stress as patient had V/Q scan within 24 hrs  Rest of mgmt as per PGY1 note above  Discussed with Wife Guillaume over the phone; updated clinical status and findings and work up  Discussed with housestaff

## 2020-03-20 NOTE — PROGRESS NOTE ADULT - SUBJECTIVE AND OBJECTIVE BOX
Patient denies chest pain or shortness of breath.   Review of systems otherwise (-)  	  MEDICATIONS:  MEDICATIONS  (STANDING):  ergocalciferol 22128 Unit(s) Oral <User Schedule>  nicotine -   7 mG/24Hr(s) Patch 1 patch Transdermal daily  sodium chloride 0.9%. 1000 milliLiter(s) (125 mL/Hr) IV Continuous <Continuous>      LABS:	 	    CARDIAC MARKERS:  CARDIAC MARKERS ( 18 Mar 2020 05:55 )  <0.015 ng/mL / x     / 217 U/L / x     / <1.0 ng/mL  CARDIAC MARKERS ( 17 Mar 2020 19:59 )  <0.015 ng/mL / x     / x     / x     / x                                    9.0    17.83 )-----------( 86       ( 20 Mar 2020 07:20 )             27.7     Hemoglobin: 9.0 g/dL (03-20 @ 07:20)  Hemoglobin: 9.3 g/dL (03-19 @ 08:20)  Hemoglobin: 8.8 g/dL (03-18 @ 05:55)  Hemoglobin: 11.3 g/dL (03-17 @ 19:59)      03-20    144  |  113<H>  |  18  ----------------------------<  76  4.3   |  24  |  1.18    Ca    8.2<L>      20 Mar 2020 07:20  Phos  2.7     03-20  Mg     2.5     03-20    TPro  6.0  /  Alb  3.1<L>  /  TBili  0.3  /  DBili  x   /  AST  36  /  ALT  29  /  AlkPhos  42  03-19    Creatinine Trend: 1.18<--, 1.05<--, 0.98<--, 1.31<--    PHYSICAL EXAM:  T(C): 36.8 (03-20-20 @ 11:03), Max: 37.1 (03-19-20 @ 19:08)  HR: 69 (03-20-20 @ 11:03) (69 - 99)  BP: 105/61 (03-20-20 @ 11:03) (101/64 - 119/68)  RR: 18 (03-20-20 @ 11:03) (18 - 18)  SpO2: 98% (03-20-20 @ 11:03) (96% - 99%)  Wt(kg): --  I&O's Summary    19 Mar 2020 07:01  -  20 Mar 2020 07:00  --------------------------------------------------------  IN: 1437.5 mL / OUT: 0 mL / NET: 1437.5 mL      Gen: Appears well in NAD  HEENT:  (-)icterus (-)pallor  CV: N S1 S2 1/6 KELLY (+)2 Pulses B/l  Resp:  Clear to ausculatation B/L, normal effort  GI: (+) BS Soft, NT, ND  Lymph:  (-)Edema, (-)obvious lymphadenopathy  Skin: Warm to touch, Normal turgor  Psych: Appropriate mood and affect      ASSESSMENT/PLAN: 	60y  Male PMHx of HLP (on simvastatin) , presents to the ED with complaints of unwitnessed syncopal episode x2.     - S/P IVF  - monitor Orthostatic BP  -  no pertinent findings on echo  - Plan for stress echo todat to evaluate BP and HR response to exertion  - Smoking cessation stressed    Víctor Mina MD, [facility]  BEEPER [phone] Patient denies chest pain or shortness of breath.   Review of systems otherwise (-)  	  MEDICATIONS:  MEDICATIONS  (STANDING):  ergocalciferol 64755 Unit(s) Oral <User Schedule>  nicotine -   7 mG/24Hr(s) Patch 1 patch Transdermal daily  sodium chloride 0.9%. 1000 milliLiter(s) (125 mL/Hr) IV Continuous <Continuous>      LABS:	 	    CARDIAC MARKERS:  CARDIAC MARKERS ( 18 Mar 2020 05:55 )  <0.015 ng/mL / x     / 217 U/L / x     / <1.0 ng/mL  CARDIAC MARKERS ( 17 Mar 2020 19:59 )  <0.015 ng/mL / x     / x     / x     / x                                    9.0    17.83 )-----------( 86       ( 20 Mar 2020 07:20 )             27.7     Hemoglobin: 9.0 g/dL (03-20 @ 07:20)  Hemoglobin: 9.3 g/dL (03-19 @ 08:20)  Hemoglobin: 8.8 g/dL (03-18 @ 05:55)  Hemoglobin: 11.3 g/dL (03-17 @ 19:59)      03-20    144  |  113<H>  |  18  ----------------------------<  76  4.3   |  24  |  1.18    Ca    8.2<L>      20 Mar 2020 07:20  Phos  2.7     03-20  Mg     2.5     03-20    TPro  6.0  /  Alb  3.1<L>  /  TBili  0.3  /  DBili  x   /  AST  36  /  ALT  29  /  AlkPhos  42  03-19    Creatinine Trend: 1.18<--, 1.05<--, 0.98<--, 1.31<--    PHYSICAL EXAM:  T(C): 36.8 (03-20-20 @ 11:03), Max: 37.1 (03-19-20 @ 19:08)  HR: 69 (03-20-20 @ 11:03) (69 - 99)  BP: 105/61 (03-20-20 @ 11:03) (101/64 - 119/68)  RR: 18 (03-20-20 @ 11:03) (18 - 18)  SpO2: 98% (03-20-20 @ 11:03) (96% - 99%)  Wt(kg): --  I&O's Summary    19 Mar 2020 07:01  -  20 Mar 2020 07:00  --------------------------------------------------------  IN: 1437.5 mL / OUT: 0 mL / NET: 1437.5 mL      Gen: Appears well in NAD  HEENT:  (-)icterus (-)pallor  CV: N S1 S2 1/6 KELLY (+)2 Pulses B/l  Resp:  Clear to ausculatation B/L, normal effort  GI: (+) BS Soft, NT, ND  Lymph:  (-)Edema, (-)obvious lymphadenopathy  Skin: Warm to touch, Normal turgor  Psych: Appropriate mood and affect      ASSESSMENT/PLAN: 	60y  Male PMHx of HLP (on simvastatin) , presents to the ED with complaints of unwitnessed syncopal episode x2.     - S/P IVF  - monitor Orthostatic BP  -  no pertinent findings on echo  - Stress echo today revealed normal augmentation in LV fx and BP response  - Smoking cessation stressed  - Remainder of cardiac w/u as an outpt     Víctor Mina MD, [facility]  BEEPER [phone]

## 2020-03-20 NOTE — CONSULT NOTE ADULT - ASSESSMENT
60 year old male with PMH of HLD and PSH of appendectomy presented to the ED with complaint of unwitnessed syncopal episode x2. 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     1. Sellar adenoma  -pending repeat cortisol level (will likely be inaccurate as drawn at 10:30 am)  -pending prolactin level  -TSH within normal limits  -recommend hormonal work up as below (can be performed as outpatient):   	-full TFTs (TSH, FT4, TT4), LH, FSH, 8AM testosterone, prolactin level (with 1:100 dilution), IGF1 level, ACTH, 8AM cortisol   -if repeat cortisol is <15, recommend ACTH stimulation test to rule out adrenal insufficiency  -- 0.25mg cosyntropin IV push with cortisol levels drawn at baseline prior to IV push, then at 30 minutes after and 60 minutes after IV push  -- At time zero, please also obtain an ACTH Level  -outpatient ophthalmology evaluation for visual field testing    2. Syncope  -likely vasovagal  -orthostatics   -pending stress test    3. HLD  -LDL 60  -continue statin     Plan discussed with primary team  Thank you for allowing me to participate in the care of this patient  Please  call  w/ any questions or concerns [phone]

## 2020-03-20 NOTE — CONSULT NOTE ADULT - REASON FOR ADMISSION
sellar lesion
syncopal episodes x 2

## 2020-03-20 NOTE — PROGRESS NOTE ADULT - SUBJECTIVE AND OBJECTIVE BOX
PGY 1 Note discussed with supervising resident and primary attending    Patient is a 60y old  Male who presents with a chief complaint of sellar lesion (20 Mar 2020 13:25)      INTERVAL HPI/OVERNIGHT EVENTS: offers no new complaints; current symptoms resolving    MEDICATIONS  (STANDING):  ergocalciferol 02510 Unit(s) Oral <User Schedule>  sodium chloride 0.9%. 1000 milliLiter(s) (125 mL/Hr) IV Continuous <Continuous>    MEDICATIONS  (PRN):  acetaminophen   Tablet .. 650 milliGRAM(s) Oral every 6 hours PRN Temp greater or equal to 38C (100.4F), Moderate Pain (4 - 6)      __________________________________________________  REVIEW OF SYSTEMS:    CONSTITUTIONAL: No fever,   EYES: no acute visual disturbances  NECK: No pain or stiffness  RESPIRATORY: No cough; No shortness of breath  CARDIOVASCULAR: No chest pain, no palpitations  GASTROINTESTINAL: No pain. No nausea or vomiting; No diarrhea   NEUROLOGICAL: No headache or numbness, no tremors  MUSCULOSKELETAL: No joint pain, no muscle pain  GENITOURINARY: no dysuria, no frequency, no hesitancy  PSYCHIATRY: no depression , no anxiety  ALL OTHER  ROS negative        Vital Signs Last 24 Hrs  T(C): 36.8 (20 Mar 2020 11:03), Max: 37.1 (19 Mar 2020 19:08)  T(F): 98.3 (20 Mar 2020 11:03), Max: 98.8 (19 Mar 2020 19:08)  HR: 86 (20 Mar 2020 13:20) (69 - 99)  BP: 107/70 (20 Mar 2020 13:20) (96/68 - 119/68)  BP(mean): 78 (20 Mar 2020 13:20) (73 - 78)  RR: 18 (20 Mar 2020 13:20) (18 - 18)  SpO2: 100% (20 Mar 2020 13:20) (96% - 100%)    ________________________________________________  PHYSICAL EXAM:  GENERAL: NAD  HEENT: Normocephalic;  conjunctivae and sclerae clear; moist mucous membranes;   NECK : supple  CHEST/LUNG: Clear to auscultation bilaterally with good air entry   HEART: S1 S2  regular; no murmurs, gallops or rubs  ABDOMEN: Soft, Nontender, Nondistended; Bowel sounds present  EXTREMITIES: no cyanosis; no edema; no calf tenderness  SKIN: warm and dry; no rash  NERVOUS SYSTEM:  Awake and alert; Oriented  to place, person and time ; no new deficits    _________________________________________________  LABS:                        9.0    17.83 )-----------( 86       ( 20 Mar 2020 07:20 )             27.7     03-20    144  |  113<H>  |  18  ----------------------------<  76  4.3   |  24  |  1.18    Ca    8.2<L>      20 Mar 2020 07:20  Phos  2.7     03-20  Mg     2.5     03-20    TPro  6.0  /  Alb  3.1<L>  /  TBili  0.3  /  DBili  x   /  AST  36  /  ALT  29  /  AlkPhos  42  03-19        CAPILLARY BLOOD GLUCOSE            RADIOLOGY & ADDITIONAL TESTS:    Imaging Personally Reviewed:  YES/NO    Consultant(s) Notes Reviewed:   YES/ No    Care Discussed with Consultants :     Plan of care was discussed with patient and /or primary care giver; all questions and concerns were addressed and care was aligned with patient's wishes.

## 2020-03-20 NOTE — PROGRESS NOTE ADULT - PROBLEM SELECTOR PLAN 6
Patient admitted from home and is expected to return home when medically stable for discharge

## 2020-03-20 NOTE — PHYSICAL THERAPY INITIAL EVALUATION ADULT - REFERRAL TO ANOTHER SERVICE NEEDED, PT EVAL
ENT for ringing on left ear; endocrinology due to mass filling and expanding on sella turcica (seat of pituitary gland)/neurology

## 2020-03-20 NOTE — PROGRESS NOTE ADULT - ASSESSMENT
60 years old male from home, lives with wife, with a PMHx of HLP (on simvastatin) and a significant PSHx of appendectomy, presents to the ED with complaints of unwitnessed syncopal episode x2. Patient reports he ate some pizza and then vomited. Notes when he got up, he felt a room spinning sensation, ear ringing and passed out. Notes no other symptoms after passing out. Patient states he then again had a second syncopal episode when he got up quickly from the first. Notes his has happened in the past which improved with rehydration. Patient wife states that  second episode was witnessed by her. Patient LOC was 1-2 minutes and patient was asymptomatic after the episode. Patient endorses mild upset stomach. Patient denies incontinence, chest pain, shortness of breath, fever, cough, burning urination or any other symptoms.  Patient is being admitted for syncopal workup most likely vasovagal syncope. EKG is negative for any acute ischemic changes, troponin T1 is negative. CT head did show a questionable emboli. Will admit to telemetry to rule out stroke. No neurological changes on examination. Offered CT angio but he refused because of ? allergy last time he had CT scan.  Interval history: While trying to get orthostatic, he felt dizzy and his blood pressure dropped. s/p 1 L bolus followed by aggressive IV hydration.   Allergies: Dye/contrast: difficulty breathing.  Wife number [phone]

## 2020-03-20 NOTE — PROGRESS NOTE ADULT - PROBLEM SELECTOR PLAN 3
HDL low, otherwise lipid panel stable  Pt takes Simvastatin at home  Continue with home regimen

## 2020-03-20 NOTE — PROGRESS NOTE ADULT - PROBLEM SELECTOR PLAN 1
CT Head noted above  CTA chest noted above  Troponin neg x 2  BP improved with IV hydration  Dr. Myles (Neurology) consulted
CT Head noted for Apparent sellar mass with calcifications with suprasellar extension.  CTA chest noted for Small filling defect in a right upper lobe segmental pulmonary artery, compatible with pulmonary embolism. Small bilateral pleural effusions.  Troponin neg x 2  BP improved with IV hydration   Dr. Myles (Neurology) consulted and recommended MR head w contrast   Holding AC until MRI of head and VQ scan is complete
CT Head noted for Apparent sellar mass with calcifications with suprasellar extension.  CTA chest noted for Small filling defect in a right upper lobe segmental pulmonary artery, compatible with pulmonary embolism. Small bilateral pleural effusions.  Troponin neg x 2  BP improved with IV hydration   Dr. Myles (Neurology) consulted and recommended MR head w contrast which showed adenoma   endocrinologist recommend cortisol and ACTH which came back <15- will do ACTH stimulation test to rule out adrenal insufficiency  will send full hormonal work up   -full TFTs (TSH, FT4, TT4), LH, FSH, 8AM testosterone, prolactin level (with 1:100 dilution), IGF1 level,

## 2020-03-20 NOTE — PROGRESS NOTE ADULT - PROBLEM SELECTOR PLAN 2
ED on admission spiked  101.9-> remains afebrile now  BP improved with IV hydration  Lactic 0.5  WBC 19.97-> 15 K   CXR & UA negative, no s/s GI upset  Blood cultures NGTD  Urine cultures NGTD  No antibiotics for now.

## 2020-03-20 NOTE — PROGRESS NOTE ADULT - PROBLEM SELECTOR PLAN 4
IMPROVE Score 2  Hold home medication in the setting of sellar mass
IMPROVE VTE Individual Risk Assessment  RISK                                                         Points  [  ] Previous VTE                                      3  [  ] Thrombophilia                                   2  [  ] Lower limb paralysis                         2 (unable to hold up >15 seconds)    [  ] Current Cancer                                  2       (within 6 months)  [x  ] Immobilization > 24 hrs                    1  [  ] ICU/CCU stay > 24 hrs                         1  [  ] Age > 60                                              1  Hold home medication in the setting of sellar mass
IMPROVE VTE Individual Risk Assessment  RISK                                                         Points  [  ] Previous VTE                                      3  [  ] Thrombophilia                                   2  [  ] Lower limb paralysis                         2 (unable to hold up >15 seconds)    [  ] Current Cancer                                  2       (within 6 months)  [x  ] Immobilization > 24 hrs                    1  [  ] ICU/CCU stay > 24 hrs                         1  [  ] Age > 60                                              1  started on lovenox for dvt ppx

## 2020-03-20 NOTE — CONSULT NOTE ADULT - SUBJECTIVE AND OBJECTIVE BOX
**THIS NOTE IS INCOMPLETE**    60 year old male with PMH of HLD and PSH of appendectomy presented to the ED with complaint of unwitnessed syncopal episode x2. 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     The patient reported he ate some pizza and then vomited. Noted when he got up, he felt a room spinning sensation, ear ringing and passed out. Notes no other symptoms after passing out. Patient stateed he then again had a second syncopal episode when he got up quickly from the first. Similar episodes have happened in the past which improved with rehydration. As per the patient' wife, she witnessed the second syncopal episode and loss of consciousness lasted 1-2 minutes. The patient was asymptomatic after the episode. Patient endorsed mild upset stomach at the time of admission. Patient denied incontinence, chest pain, shortness of breath, fever, cough, burning urination or any other symptoms.  EKG was negative for any acute ischemic changes, troponin T1 was negative. MRI of the brain was significant for an enhancing mass filling and expanding the sella turcica measuring 1.5 x 1.2 x 1.5cm and encroaching/elevating the optic chiasm.  Random cortisol level was 1.6.     ALLERGIES  Dye/contrast: difficulty breathing.    PAST MEDICAL & SURGICAL HISTORY:  Hypercholesterolemia  S/P appendectomy: 10 years ago    SOCIAL HISTORY  Alcohol: denies   Tobacco: active smoker   Illicit substance use: denies     FAMILY HISTORY:      MEDICATIONS  (STANDING):  ergocalciferol 57682 Unit(s) Oral <User Schedule>  nicotine -   7 mG/24Hr(s) Patch 1 patch Transdermal daily  sodium chloride 0.9%. 1000 milliLiter(s) (125 mL/Hr) IV Continuous <Continuous>    MEDICATIONS  (PRN):  acetaminophen   Tablet .. 650 milliGRAM(s) Oral every 6 hours PRN Temp greater or equal to 38C (100.4F), Moderate Pain (4 - 6)      REVIEW OF SYSTEMS:  CONSTITUTIONAL: No fever, weight loss, or fatigue  EYES: No eye pain, visual disturbances, or discharge  ENMT:  No difficulty hearing, tinnitus, vertigo; No sinus or throat pain  NECK: No pain or stiffness  RESPIRATORY: No cough, wheezing, chills or hemoptysis; No shortness of breath  CARDIOVASCULAR: No chest pain, palpitations, dizziness, or leg swelling  GASTROINTESTINAL: No abdominal or epigastric pain. No nausea, vomiting, or hematemesis; No diarrhea or constipation. No melena or hematochezia.  GENITOURINARY: No dysuria, frequency, hematuria, or incontinence  NEUROLOGICAL: No headaches, memory loss, loss of strength, numbness, or tremors  SKIN: No itching, burning, rashes, or lesions   LYMPH NODES: No enlarged glands  ENDOCRINE: No heat or cold intolerance; No hair loss  MUSCULOSKELETAL: No joint pain or swelling; No muscle, back, or extremity pain  PSYCHIATRIC: No depression, anxiety, mood swings, or difficulty sleeping  HEME/LYMPH: No easy bruising, or bleeding gums  ALLERGY AND IMMUNOLOGIC: No hives or eczema      PHYSICAL EXAM:    VITAL SIGNS  T(C): 36.8 (03-20-20 @ 11:03), Max: 37.1 (03-19-20 @ 19:08)  HR: 69 (03-20-20 @ 11:03) (69 - 99)  BP: 105/61 (03-20-20 @ 11:03) (101/64 - 119/68)  RR: 18 (03-20-20 @ 11:03) (18 - 18)  SpO2: 98% (03-20-20 @ 11:03) (96% - 99%)    GENERAL: NAD, well-groomed, well-developed  HEAD:  Atraumatic, Normocephalic  EYES: EOMI, PERRLA, conjunctiva and sclera clear  ENMT: No tonsillar erythema, exudates, or enlargement; Moist mucous membranes, Good dentition, No lesions  NECK: Supple, No JVD, Normal thyroid  NERVOUS SYSTEM:  Alert & Oriented X3, Good concentration; Motor Strength 5/5 B/L upper and lower extremities;   CHEST/LUNG: Clear to ausculation bilaterally; No rales, rhonchi, wheezing, or rubs  HEART: Regular rate and rhythm; No murmurs, rubs, or gallops  ABDOMEN: Soft, Nontender, Nondistended; Bowel sounds present  EXTREMITIES:  2+ Peripheral Pulses, No clubbing, cyanosis, or edema  LYMPH: No lymphadenopathy noted  SKIN: No rashes or lesions    LABS:                        9.0    17.83 )-----------( 86       ( 20 Mar 2020 07:20 )             27.7     03-20    144  |  113<H>  |  18  ----------------------------<  76  4.3   |  24  |  1.18    Ca    8.2<L>      20 Mar 2020 07:20  Phos  2.7     03-20  Mg     2.5     03-20    TPro  6.0  /  Alb  3.1<L>  /  TBili  0.3  /  DBili  x   /  AST  36  /  ALT  29  /  AlkPhos  42  03-19        CAPILLARY BLOOD GLUCOSE 60 year old male with PMH of HLD and PSH of appendectomy presented to the ED with complaint of unwitnessed syncopal episode x2. 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     The patient reported he ate some pizza and then vomited. Noted when he got up, he felt a room spinning sensation, ear ringing and passed out. Notes no other symptoms after passing out. Patient stateed he then again had a second syncopal episode when he got up quickly from the first. Similar episodes have happened in the past which improved with rehydration. As per the patient' wife, she witnessed the second syncopal episode and loss of consciousness lasted 1-2 minutes. The patient was asymptomatic after the episode. Patient endorsed mild upset stomach at the time of admission. Patient denied incontinence, chest pain, shortness of breath, fever, cough, burning urination or any other symptoms.  EKG was negative for any acute ischemic changes, troponin T1 was negative. MRI of the brain was significant for an enhancing mass filling and expanding the sella turcica measuring 1.5 x 1.2 x 1.5cm and encroaching/elevating the optic chiasm.  Random cortisol level was 1.6. He denies headache or visual field defects.     ALLERGIES  Dye/contrast: difficulty breathing.    PAST MEDICAL & SURGICAL HISTORY:  Hypercholesterolemia  S/P appendectomy: 10 years ago    SOCIAL HISTORY  Alcohol: denies   Tobacco: active smoker   Illicit substance use: denies     FAMILY HISTORY:  No known family history of pituitary adenoma     MEDICATIONS  (STANDING):  ergocalciferol 26856 Unit(s) Oral <User Schedule>  nicotine -   7 mG/24Hr(s) Patch 1 patch Transdermal daily  sodium chloride 0.9%. 1000 milliLiter(s) (125 mL/Hr) IV Continuous <Continuous>    MEDICATIONS  (PRN):  acetaminophen   Tablet .. 650 milliGRAM(s) Oral every 6 hours PRN Temp greater or equal to 38C (100.4F), Moderate Pain (4 - 6)      REVIEW OF SYSTEMS:  CONSTITUTIONAL: No fever, weight loss, or fatigue  EYES: No eye pain, visual disturbances, or discharge  ENMT:  No difficulty hearing, tinnitus, vertigo; No sinus or throat pain  NECK: No pain or stiffness  RESPIRATORY: No cough, wheezing, chills or hemoptysis; No shortness of breath  CARDIOVASCULAR: No chest pain, palpitations, dizziness, or leg swelling  GASTROINTESTINAL: No abdominal or epigastric pain. No nausea, vomiting, or hematemesis; No diarrhea or constipation. No melena or hematochezia.  GENITOURINARY: No dysuria, frequency, hematuria, or incontinence  NEUROLOGICAL: No headaches, memory loss, loss of strength, numbness, or tremors  SKIN: No itching, burning, rashes, or lesions   LYMPH NODES: No enlarged glands  ENDOCRINE: No heat or cold intolerance; No hair loss  MUSCULOSKELETAL: No joint pain or swelling; No muscle, back, or extremity pain  PSYCHIATRIC: No depression, anxiety, mood swings, or difficulty sleeping  HEME/LYMPH: No easy bruising, or bleeding gums  ALLERGY AND IMMUNOLOGIC: No hives or eczema      PHYSICAL EXAM:    VITAL SIGNS  T(C): 36.8 (03-20-20 @ 11:03), Max: 37.1 (03-19-20 @ 19:08)  HR: 69 (03-20-20 @ 11:03) (69 - 99)  BP: 105/61 (03-20-20 @ 11:03) (101/64 - 119/68)  RR: 18 (03-20-20 @ 11:03) (18 - 18)  SpO2: 98% (03-20-20 @ 11:03) (96% - 99%)    GENERAL: NAD, well-groomed, well-developed  HEAD:  Atraumatic, Normocephalic  EYES: EOMI, PERRLA, conjunctiva and sclera clear  ENMT: No tonsillar erythema, exudates, or enlargement; Moist mucous membranes, Good dentition, No lesions  NECK: Supple, No JVD, Normal thyroid  NERVOUS SYSTEM:  Alert & Oriented X3, Good concentration; Motor Strength 5/5 B/L upper and lower extremities;   CHEST/LUNG: Clear to ausculation bilaterally; No rales, rhonchi, wheezing, or rubs  HEART: Regular rate and rhythm; No murmurs, rubs, or gallops  ABDOMEN: Soft, Nontender, Nondistended; Bowel sounds present  EXTREMITIES:  2+ Peripheral Pulses, No clubbing, cyanosis, or edema  LYMPH: No lymphadenopathy noted  SKIN: No rashes or lesions    LABS:                        9.0    17.83 )-----------( 86       ( 20 Mar 2020 07:20 )             27.7     03-20    144  |  113<H>  |  18  ----------------------------<  76  4.3   |  24  |  1.18    Ca    8.2<L>      20 Mar 2020 07:20  Phos  2.7     03-20  Mg     2.5     03-20    TPro  6.0  /  Alb  3.1<L>  /  TBili  0.3  /  DBili  x   /  AST  36  /  ALT  29  /  AlkPhos  42  03-19        CAPILLARY BLOOD GLUCOSE

## 2020-03-20 NOTE — PHYSICAL THERAPY INITIAL EVALUATION ADULT - PERTINENT HX OF CURRENT PROBLEM, REHAB EVAL
syncope/collapse x 2; chronic h/o ringing on left ear; recently found to have mass filling and expanding the sella turcica as per MRI on 3/19

## 2020-03-20 NOTE — PROGRESS NOTE ADULT - SUBJECTIVE AND OBJECTIVE BOX
Time of Visit:  Patient seen and examined.     MEDICATIONS  (STANDING):  ergocalciferol 51606 Unit(s) Oral <User Schedule>  sodium chloride 0.9%. 1000 milliLiter(s) (125 mL/Hr) IV Continuous <Continuous>      MEDICATIONS  (PRN):  acetaminophen   Tablet .. 650 milliGRAM(s) Oral every 6 hours PRN Temp greater or equal to 38C (100.4F), Moderate Pain (4 - 6)       Medications up to date at time of exam.      PHYSICAL EXAMINATION:  Patient has no new complaints.  GENERAL: The patient is a well-developed, well-nourished, in no apparent distress.     Vital Signs Last 24 Hrs  T(C): 37 (20 Mar 2020 16:12), Max: 37 (20 Mar 2020 16:12)  T(F): 98.6 (20 Mar 2020 16:12), Max: 98.6 (20 Mar 2020 16:12)  HR: 76 (20 Mar 2020 16:12) (69 - 91)  BP: 108/52 (20 Mar 2020 16:12) (96/68 - 108/52)  BP(mean): 78 (20 Mar 2020 13:20) (73 - 78)  RR: 18 (20 Mar 2020 16:12) (18 - 18)  SpO2: 97% (20 Mar 2020 16:12) (96% - 100%)   (if applicable)    Chest Tube (if applicable)    HEENT: Head is normocephalic and atraumatic. Extraocular muscles are intact. Mucous membranes are moist.     NECK: Supple, no palpable adenopathy.    LUNGS: Clear to auscultation, no wheezing, rales, or rhonchi.    HEART: Regular rate and rhythm without murmur.    ABDOMEN: Soft, nontender, and nondistended.  No hepatosplenomegaly is noted.    : No painful voiding, no pelvic pain    EXTREMITIES: Without any cyanosis, clubbing, rash, lesions or edema.    NEUROLOGIC: Awake, alert, oriented, grossly intact    SKIN: Warm, dry, good turgor.      LABS:                        9.0    17.83 )-----------( 86       ( 20 Mar 2020 07:20 )             27.7     03-20    144  |  113<H>  |  18  ----------------------------<  76  4.3   |  24  |  1.18    Ca    8.2<L>      20 Mar 2020 07:20  Phos  2.7     03-20  Mg     2.5     03-20    TPro  6.0  /  Alb  3.1<L>  /  TBili  0.3  /  DBili  x   /  AST  36  /  ALT  29  /  AlkPhos  42  03-19                        MICROBIOLOGY: (if applicable)    RADIOLOGY & ADDITIONAL STUDIES:  EKG:   CXR:< from: Transthoracic Echocardiogram (03.19.20 @ 09:54) >    Patient name: LUMA DENNISON  YOB: 1960   Age: 60 (M)   MR#: [id number]  Study Date: 3/19/2020  Location: [address] [place], [place] [address]onographer: Scotty Wen RUST  Study quality: Fair  Referring Physician: Michelle Guardado MD  Blood Pressure: 106/58 mmHg  Height: 167 cm  Weight: 66 kg  BSA: 1.8 m2  ------------------------------------------------------------------------    PROCEDURE: Transthoracic echocardiogram with 2-D, M-Mode  and complete spectral and color flow Doppler.  INDICATION: Syncope and Collapse (R55)  HISTORY:  ------------------------------------------------------------------------  DIMENSIONS:  Dimensions:     Normal Values:  LA:     3.0 cm    2.0 - 4.0 cm  Ao:     3.4 cm    2.0 - 3.8 cm  SEPTUM: 0.8 cm    0.6 - 1.2 cm  PWT: 0.8 cm    0.6 - 1.1 cm  LVIDd:  4.3 cm    3.0 - 5.6 cm  LVIDs:  3.0 cm    1.8 - 4.0 cm      Derived Variables:  LVMI: 60 g/m2  RWT: 0.37  Ejection Fraction Visual Estimate: 55-60 %    ------------------------------------------------------------------------  OBSERVATIONS:  Mitral Valve: Normal mitral valve. Trace mitral  regurgitation.  Aortic Root: Normal aortic root.  Aortic Valve: Normal trileaflet aortic valve. No aortic  stenosis. No aortic valve regurgitation seen.  Left Atrium: Normal leftatrium.  LA volume index = 23  cc/m2.  Left Ventricle: Normal Left Ventricular Systolic Function,  (EF = 55 to 60% by visual estimation) Not all LV wall  segments were seen. Normal left ventricular internal  dimensions and wall thicknesses. Normal diastolic function.  Right Heart: Normal right atrium. Normal right ventricular  size and systolic function (TAPSE 2.3 cm). Normal tricuspid  valve. Trace tricuspid regurgitation. Pulmonic valve not  well seen. Trace pulmonic insufficiency is noted.  Pericardium/PleuraNo pericardial effusion.  Hemodynamic: RA Pressure is 3 mm Hg. RV systolic pressure  is normal at  22 mm Hg.  ------------------------------------------------------------------------  CONCLUSIONS:  1. Normal mitral valve. Trace mitral regurgitation.  2. Normal trileaflet aortic valve. No aortic stenosis. No  aortic valve regurgitation seen.  3. Normal aortic root.  4. Normal left atrium.  5. Normal left ventricular internal dimensions and wall  thicknesses.  6. Normal Left Ventricular Systolic Function,  (EF = 55 to  60% by visual estimation)  7. Normal diastolic function.  8. Normal right atrium.  9. Normal right ventricular size and systolic function  (TAPSE 2.3 cm).  10. RV systolic pressure is normal at  22 mm Hg.  11. Normal tricuspid valve. Trace tricuspid regurgitation.  12. Pulmonic valve not well seen. Trace pulmonic  insufficiency is noted.  13. No pericardial effusion.    *** No previous Echo exam.  ------------------------------------------------------------------------  Confirmed on  3/19/2020 - 16:46:42 by Joel Tillman MD  ------------------------------------------------------------------------    < end of copied text >    Stress:< from: Stress Echocardiogram-Exercise (03.20.20 @ 08:48) >    Patient name: LUMA DENNISON  YOB: 1960   Age: 60 (M)   MR#: [id number]  Study Date: 3/20/2020  Location: [address] [place], [place] [id number]: Scotty Wen RUST  Study quality: Technically good  Referring Physician: Michelle Guardado MD  Blood Pressure: 121/81 mmHg  Height: 168 cm  Weight: 66 kg  BSA: 1.8 m2  ------------------------------------------------------------------------    PROCEDURE: Treadmill Stress Echocardiogram with 2-D, M-Mode  and complete spectral and color flow Doppler.  INDICATION: Syncope and Collapse (R55)  HISTORY: HLD  ------------------------------------------------------------------------  EXERCISE STRESS TEST:  Protocol: Osorio   0 min 0 sec   Maximum Stage: 0  HR: Baseline HR: 71 bpm   Peak HR: 157 bpm   % MPHR: 98 %  BP: Baseline BP: 121/81 mmHg   Peak BP: 154/88 mmHg   Peak  RPP: [id number] (Rate Pressure Product)  Baseline EKG: Normal  EKG changes: No ischemic changes  Arrhythmia: None  Heart Rhythm: Normal sinus 71 BPM  HR Response: Appropriate  BP Response: Appropriate  Symptoms: None  ------------------------------------------------------------------------  ECHOCARDIOGRAPHIC STUDY:   (A) Baseline echocardiogram  reveals:  1. Normal left ventricular internal dimensions and wall  thicknesses.  2. Normal left ventricular systolic function.     (B) Stress echocardiogram  reveals:  Normal augmentation in left ventricular function.  ------------------------------------------------------------------------  CONCLUSIONS:  1. Normal hemodynamic response  2. Normal electrocardiographic response  3. Normal augmentation in left ventricular function.  4. Normal stress echocardiogram with no evidence of  inducible ischemia.    ------------------------------------------------------------------------  SUMMARY:  Normal stress echocardiogram with no evidence of inducible  ischemia.  ------------------------------------------------------------------------  Confirmed on  3/20/2020 - 12:56:45 by Víctor Mina MD  ------------------------------------------------------------------------    < end of copied text >            IMPRESSION: 60y Male PAST MEDICAL & SURGICAL HISTORY:  Hypercholesterolemia  S/P appendectomy: 10 years ago   p/w           IMP: This is a 60 yr old man , active smoker, HLD presented with syncope. CT head ? sella mass.  CTPA show small RUL PE, i doubt this is the etiology of syncope . There is b/l mediastinal adenopathy with cystic b/l upper lobe lesion due to smoke exposure vs sarcoidosis. . V/Q scan is low probability for PE.      -B/L mediastinal hilar adenopathy  -ACE level pending   -PE unlikely      Sugg:  -repeat CT with contrast in 3-4 months ( all elective procedures cancelled due to COVID-19 pandemic)  -symptomatic anemia  -syncope work up  -f/u echo  -malignancy work up  -MRI/MRA of brain  -neuro eval  -no need for anticoag for PE  -out pat pul f/u with my office  -out pat PFT  -advise to stop smoking   -nicotine patch 7      case discussed with Dr Guardado  spoke with wife Guillaume [phone]

## 2020-03-20 NOTE — CONSULT NOTE ADULT - SUBJECTIVE AND OBJECTIVE BOX
NEUROLOGY CONSULT    HPI:  59 yo man with an apparent incidental finding of a sellar/suprasellar calcified mass, possible adenoma vs meningioma?    EXAM: MR BRAIN WAW IC   PROCEDURE DATE: 03/19/2020   INTERPRETATION: MRI brain with and without contrast   Comparison CT performed 2 days prior   History mass   Contrast Gadavist   There is trace scattered chronic microvascular ischemic change in the   subcortical white matter without mass effect, cortical edema or   hydrocephalus. There is no evidence of acute infarct or previous parenchymal   hemorrhage. There is a developmental venous anomaly in the right temporal   lobe. There is an enhancing mass filling and expanding the sella turcica. It   measures 1.5 cm and caudad dimension, 1.2 cm in AP dimension and 1.5 cm   transversely. It encroaches on and elevates the optic chiasm. The orbital   contents and cerebellar tonsils are within normal limits.   IMPRESSION:   Sellar mass consistent with an adenoma as above     NEUROLOGICAL EXAM:  MS: Awake, alert, oriented x 4, language fluent, comprehension intact, naming intact, repetition intact, normal affect  CN: PERRLA, EOMI, visual fields intact, facial sensation intact, face symmetric, hearing intact, no dysarthria, symmetric palatal elevation, tongue midline, symmetric shoulder shrug and SCM strength  Motor: normal bulk, normal tone, power 5/5 in all four extremities, no tremors or fasciculations  Sensation: intact to light touch, vibration sense, proprioception  Reflexes: 2+ at biceps, brachioradialis, patella, ankle bilaterally.  Flexor plantar response bilaterally.  No clonus  Coordination: no dysmetria  Gait: normal, no ataxia  Romberg - negative    Assessment:  59 yo man with an apparent incidental finding of a sellar/suprasellar calcified mass, possible adenoma vs meningioma?  Syncope likely related to volume depletion.    Recommendations:  1. Consult Neurosurgery as outpatient, Dr Johnson Santana ([phone]) regarding sellar mass  2. Follow up with Neurology, Dr Myles ([phone]) as outpatient as well    Floyd Myles MD  [facility] Department of Neurology  [facility]

## 2020-03-21 ENCOUNTER — TRANSCRIPTION ENCOUNTER (OUTPATIENT)
Age: 60
End: 2020-03-21

## 2020-03-21 VITALS
HEART RATE: 66 BPM | TEMPERATURE: 99 F | SYSTOLIC BLOOD PRESSURE: 104 MMHG | RESPIRATION RATE: 18 BRPM | DIASTOLIC BLOOD PRESSURE: 70 MMHG | OXYGEN SATURATION: 97 %

## 2020-03-21 LAB
ANION GAP SERPL CALC-SCNC: 9 MMOL/L — SIGNIFICANT CHANGE UP (ref 5–17)
BUN SERPL-MCNC: 21 MG/DL — HIGH (ref 7–18)
CALCIUM SERPL-MCNC: 9 MG/DL — SIGNIFICANT CHANGE UP (ref 8.4–10.5)
CHLORIDE SERPL-SCNC: 104 MMOL/L — SIGNIFICANT CHANGE UP (ref 96–108)
CO2 SERPL-SCNC: 27 MMOL/L — SIGNIFICANT CHANGE UP (ref 22–31)
CORTIS AM PEAK SERPL-MCNC: 0.9 UG/DL — LOW (ref 6–18.4)
CORTIS AM PEAK SERPL-MCNC: 10.1 UG/DL — SIGNIFICANT CHANGE UP (ref 6–18.4)
CORTIS AM PEAK SERPL-MCNC: 7.1 UG/DL — SIGNIFICANT CHANGE UP (ref 6–18.4)
CREAT SERPL-MCNC: 1.25 MG/DL — SIGNIFICANT CHANGE UP (ref 0.5–1.3)
FSH SERPL-MCNC: 0.7 IU/L — LOW (ref 1.5–12.4)
GLUCOSE SERPL-MCNC: 73 MG/DL — SIGNIFICANT CHANGE UP (ref 70–99)
INSULIN-LIKE GROWTH FACTOR 1 INTERPRETATION: SIGNIFICANT CHANGE UP
INSULIN-LIKE GROWTH FACTOR 1: 51 NG/ML — LOW (ref 55–203)
LH SERPL-ACNC: <0.3 IU/L — SIGNIFICANT CHANGE UP
MAGNESIUM SERPL-MCNC: 2.6 MG/DL — SIGNIFICANT CHANGE UP (ref 1.6–2.6)
PHOSPHATE SERPL-MCNC: 3.9 MG/DL — SIGNIFICANT CHANGE UP (ref 2.5–4.5)
POTASSIUM SERPL-MCNC: 4.6 MMOL/L — SIGNIFICANT CHANGE UP (ref 3.5–5.3)
POTASSIUM SERPL-SCNC: 4.6 MMOL/L — SIGNIFICANT CHANGE UP (ref 3.5–5.3)
SODIUM SERPL-SCNC: 140 MMOL/L — SIGNIFICANT CHANGE UP (ref 135–145)
T3 SERPL-MCNC: 43 NG/DL — LOW (ref 80–200)
T4 AB SER-ACNC: 4.4 UG/DL — LOW (ref 4.6–12)
T4 FREE SERPL-MCNC: 0.4 NG/DL — LOW (ref 0.9–1.8)
TESTOST FREE+TOTAL PANEL SERPL-MCNC: <2.5 NG/DL — LOW (ref 193–740)
TSH SERPL-MCNC: 1.56 UU/ML — SIGNIFICANT CHANGE UP (ref 0.34–4.82)

## 2020-03-21 PROCEDURE — 93970 EXTREMITY STUDY: CPT

## 2020-03-21 PROCEDURE — 99239 HOSP IP/OBS DSCHRG MGMT >30: CPT | Mod: GC

## 2020-03-21 PROCEDURE — 70553 MRI BRAIN STEM W/O & W/DYE: CPT

## 2020-03-21 PROCEDURE — 84484 ASSAY OF TROPONIN QUANT: CPT

## 2020-03-21 PROCEDURE — 93306 TTE W/DOPPLER COMPLETE: CPT

## 2020-03-21 PROCEDURE — 82306 VITAMIN D 25 HYDROXY: CPT

## 2020-03-21 PROCEDURE — 83690 ASSAY OF LIPASE: CPT

## 2020-03-21 PROCEDURE — 83036 HEMOGLOBIN GLYCOSYLATED A1C: CPT

## 2020-03-21 PROCEDURE — 84403 ASSAY OF TOTAL TESTOSTERONE: CPT

## 2020-03-21 PROCEDURE — 81001 URINALYSIS AUTO W/SCOPE: CPT

## 2020-03-21 PROCEDURE — 99285 EMERGENCY DEPT VISIT HI MDM: CPT | Mod: 25

## 2020-03-21 PROCEDURE — 96361 HYDRATE IV INFUSION ADD-ON: CPT

## 2020-03-21 PROCEDURE — 74176 CT ABD & PELVIS W/O CONTRAST: CPT

## 2020-03-21 PROCEDURE — 86901 BLOOD TYPING SEROLOGIC RH(D): CPT

## 2020-03-21 PROCEDURE — 80053 COMPREHEN METABOLIC PANEL: CPT

## 2020-03-21 PROCEDURE — 93320 DOPPLER ECHO COMPLETE: CPT

## 2020-03-21 PROCEDURE — 84100 ASSAY OF PHOSPHORUS: CPT

## 2020-03-21 PROCEDURE — 82607 VITAMIN B-12: CPT

## 2020-03-21 PROCEDURE — 82553 CREATINE MB FRACTION: CPT

## 2020-03-21 PROCEDURE — 84480 ASSAY TRIIODOTHYRONINE (T3): CPT

## 2020-03-21 PROCEDURE — 82550 ASSAY OF CK (CPK): CPT

## 2020-03-21 PROCEDURE — 93005 ELECTROCARDIOGRAM TRACING: CPT

## 2020-03-21 PROCEDURE — 86900 BLOOD TYPING SEROLOGIC ABO: CPT

## 2020-03-21 PROCEDURE — 93325 DOPPLER ECHO COLOR FLOW MAPG: CPT

## 2020-03-21 PROCEDURE — 86803 HEPATITIS C AB TEST: CPT

## 2020-03-21 PROCEDURE — 83605 ASSAY OF LACTIC ACID: CPT

## 2020-03-21 PROCEDURE — 82962 GLUCOSE BLOOD TEST: CPT

## 2020-03-21 PROCEDURE — 78579 LUNG VENTILATION IMAGING: CPT

## 2020-03-21 PROCEDURE — 87040 BLOOD CULTURE FOR BACTERIA: CPT

## 2020-03-21 PROCEDURE — 86850 RBC ANTIBODY SCREEN: CPT

## 2020-03-21 PROCEDURE — 84443 ASSAY THYROID STIM HORMONE: CPT

## 2020-03-21 PROCEDURE — 71046 X-RAY EXAM CHEST 2 VIEWS: CPT

## 2020-03-21 PROCEDURE — 85027 COMPLETE CBC AUTOMATED: CPT

## 2020-03-21 PROCEDURE — 80076 HEPATIC FUNCTION PANEL: CPT

## 2020-03-21 PROCEDURE — 87086 URINE CULTURE/COLONY COUNT: CPT

## 2020-03-21 PROCEDURE — 85730 THROMBOPLASTIN TIME PARTIAL: CPT

## 2020-03-21 PROCEDURE — 80048 BASIC METABOLIC PNL TOTAL CA: CPT

## 2020-03-21 PROCEDURE — 83001 ASSAY OF GONADOTROPIN (FSH): CPT

## 2020-03-21 PROCEDURE — 80061 LIPID PANEL: CPT

## 2020-03-21 PROCEDURE — 36415 COLL VENOUS BLD VENIPUNCTURE: CPT

## 2020-03-21 PROCEDURE — 84146 ASSAY OF PROLACTIN: CPT

## 2020-03-21 PROCEDURE — 82164 ANGIOTENSIN I ENZYME TEST: CPT

## 2020-03-21 PROCEDURE — 96360 HYDRATION IV INFUSION INIT: CPT

## 2020-03-21 PROCEDURE — 82533 TOTAL CORTISOL: CPT

## 2020-03-21 PROCEDURE — 84436 ASSAY OF TOTAL THYROXINE: CPT

## 2020-03-21 PROCEDURE — 84439 ASSAY OF FREE THYROXINE: CPT

## 2020-03-21 PROCEDURE — 82024 ASSAY OF ACTH: CPT

## 2020-03-21 PROCEDURE — 83735 ASSAY OF MAGNESIUM: CPT

## 2020-03-21 PROCEDURE — 82330 ASSAY OF CALCIUM: CPT

## 2020-03-21 PROCEDURE — 85610 PROTHROMBIN TIME: CPT

## 2020-03-21 PROCEDURE — 70450 CT HEAD/BRAIN W/O DYE: CPT

## 2020-03-21 PROCEDURE — 71275 CT ANGIOGRAPHY CHEST: CPT

## 2020-03-21 PROCEDURE — 83002 ASSAY OF GONADOTROPIN (LH): CPT

## 2020-03-21 RX ORDER — HYDROCORTISONE 20 MG
3 TABLET ORAL
Qty: 180 | Refills: 0
Start: 2020-03-21 | End: 2020-04-19

## 2020-03-21 RX ORDER — LEVOTHYROXINE SODIUM 125 MCG
50 TABLET ORAL DAILY
Refills: 0 | Status: DISCONTINUED | OUTPATIENT
Start: 2020-03-21 | End: 2020-03-21

## 2020-03-21 RX ORDER — LEVOTHYROXINE SODIUM 125 MCG
1 TABLET ORAL
Qty: 30 | Refills: 0
Start: 2020-03-21 | End: 2020-04-19

## 2020-03-21 RX ORDER — HYDROCORTISONE 20 MG
10 TABLET ORAL
Refills: 0 | Status: DISCONTINUED | OUTPATIENT
Start: 2020-03-21 | End: 2020-03-21

## 2020-03-21 RX ADMIN — Medication 10 MILLIGRAM(S): at 09:39

## 2020-03-21 RX ADMIN — ERGOCALCIFEROL 50000 UNIT(S): 1.25 CAPSULE ORAL at 11:33

## 2020-03-21 RX ADMIN — Medication 50 MICROGRAM(S): at 11:33

## 2020-03-21 NOTE — DISCHARGE NOTE NURSING/CASE MANAGEMENT/SOCIAL WORK - PATIENT PORTAL LINK FT
You can access the FollowMyHealth Patient Portal offered by [facility] by registering at the following website: [email or web address]. By joining [employer]’s FollowMyHealth portal, you will also be able to view your health information using other applications (apps) compatible with our system.

## 2020-03-21 NOTE — PROGRESS NOTE ADULT - ATTENDING COMMENTS
Agree with above assessment and plan as outlined above.    - outpt cardiac f/u [phone]    Víctor Mina MD, FACC  BEEPER [phone]

## 2020-03-21 NOTE — PROGRESS NOTE ADULT - REASON FOR ADMISSION
syncopal episodes x 2

## 2020-03-21 NOTE — PROGRESS NOTE ADULT - SUBJECTIVE AND OBJECTIVE BOX
Time of Visit:  Patient seen and examined.     MEDICATIONS  (STANDING):  ergocalciferol 13061 Unit(s) Oral <User Schedule>  hydrocortisone 10 milliGRAM(s) Oral <User Schedule>  levothyroxine 50 MICROGram(s) Oral daily      MEDICATIONS  (PRN):  acetaminophen   Tablet .. 650 milliGRAM(s) Oral every 6 hours PRN Temp greater or equal to 38C (100.4F), Moderate Pain (4 - 6)       Medications up to date at time of exam.      PHYSICAL EXAMINATION:  Patient has no new complaints.  GENERAL: The patient is a well-developed, well-nourished, in no apparent distress.     Vital Signs Last 24 Hrs  T(C): 37 (21 Mar 2020 15:57), Max: 37.1 (20 Mar 2020 20:14)  T(F): 98.6 (21 Mar 2020 15:57), Max: 98.8 (20 Mar 2020 20:14)  HR: 66 (21 Mar 2020 15:57) (59 - 84)  BP: 104/70 (21 Mar 2020 15:57) (98/61 - 106/70)  BP(mean): --  RR: 18 (21 Mar 2020 15:57) (16 - 18)  SpO2: 97% (21 Mar 2020 15:57) (96% - 99%)   (if applicable)    Chest Tube (if applicable)    HEENT: Head is normocephalic and atraumatic. Extraocular muscles are intact. Mucous membranes are moist.     NECK: Supple, no palpable adenopathy.    LUNGS: Clear to auscultation, no wheezing, rales, or rhonchi.    HEART: Regular rate and rhythm without murmur.    ABDOMEN: Soft, nontender, and nondistended.  No hepatosplenomegaly is noted.    : No painful voiding, no pelvic pain    EXTREMITIES: Without any cyanosis, clubbing, rash, lesions or edema.    NEUROLOGIC: Awake, alert, oriented, grossly intact    SKIN: Warm, dry, good turgor.      LABS:                        9.0    17.83 )-----------( 86       ( 20 Mar 2020 07:20 )             27.7     03-21    140  |  104  |  21<H>  ----------------------------<  73  4.6   |  27  |  1.25    Ca    9.0      21 Mar 2020 06:47  Phos  3.9     03-21  Mg     2.6     03-21                          MICROBIOLOGY: (if applicable)    RADIOLOGY & ADDITIONAL STUDIES:  EKG:   CXR:  ECHO:    IMPRESSION: 60y Male PAST MEDICAL & SURGICAL HISTORY:  Hypercholesterolemia  S/P appendectomy: 10 years ago   p/w           RECOMMENDATIONS: Time of Visit:  Patient seen and examined.     MEDICATIONS  (STANDING):  ergocalciferol 55770 Unit(s) Oral <User Schedule>  hydrocortisone 10 milliGRAM(s) Oral <User Schedule>  levothyroxine 50 MICROGram(s) Oral daily      MEDICATIONS  (PRN):  acetaminophen   Tablet .. 650 milliGRAM(s) Oral every 6 hours PRN Temp greater or equal to 38C (100.4F), Moderate Pain (4 - 6)       Medications up to date at time of exam.      PHYSICAL EXAMINATION:  Patient has no new complaints.  GENERAL: The patient is a well-developed, well-nourished, in no apparent distress.     Vital Signs Last 24 Hrs  T(C): 37 (21 Mar 2020 15:57), Max: 37.1 (20 Mar 2020 20:14)  T(F): 98.6 (21 Mar 2020 15:57), Max: 98.8 (20 Mar 2020 20:14)  HR: 66 (21 Mar 2020 15:57) (59 - 84)  BP: 104/70 (21 Mar 2020 15:57) (98/61 - 106/70)  BP(mean): --  RR: 18 (21 Mar 2020 15:57) (16 - 18)  SpO2: 97% (21 Mar 2020 15:57) (96% - 99%)   (if applicable)    Chest Tube (if applicable)    HEENT: Head is normocephalic and atraumatic. Extraocular muscles are intact. Mucous membranes are moist.     NECK: Supple, no palpable adenopathy.    LUNGS: Clear to auscultation, no wheezing, rales, or rhonchi.    HEART: Regular rate and rhythm without murmur.    ABDOMEN: Soft, nontender, and nondistended.  No hepatosplenomegaly is noted.    : No painful voiding, no pelvic pain    EXTREMITIES: Without any cyanosis, clubbing, rash, lesions or edema.    NEUROLOGIC: Awake, alert, oriented, grossly intact    SKIN: Warm, dry, good turgor.      LABS:                        9.0    17.83 )-----------( 86       ( 20 Mar 2020 07:20 )             27.7     03-21    140  |  104  |  21<H>  ----------------------------<  73  4.6   |  27  |  1.25    Ca    9.0      21 Mar 2020 06:47  Phos  3.9     03-21  Mg     2.6     03-21                          MICROBIOLOGY: (if applicable)    RADIOLOGY & ADDITIONAL STUDIES:  EKG:   CXR:  ECHO:    IMPRESSION: 60y Male PAST MEDICAL & SURGICAL HISTORY:  Hypercholesterolemia  S/P appendectomy: 10 years ago   p/w         IMP: This is a 60 yr old man , active smoker, HLD presented with syncope. CT head ? sella mass.  CTPA show small RUL PE, i doubt this is the etiology of syncope . There is b/l mediastinal adenopathy with cystic b/l upper lobe lesion due to smoke exposure vs sarcoidosis. . V/Q scan is low probability for PE.      -B/L mediastinal hilar adenopathy  -ACE level pending   -PE unlikely      Sugg:  -repeat CT with contrast in 3-4 months ( all elective procedures cancelled due to COVID-19 pandemic)  -symptomatic anemia  -syncope work up  -f/u echo  -malignancy work up  -MRI/MRA of brain  -neuro eval  -no need for anticoag for PE  -out pat pul f/u with my office  -out pat PFT  -advise to stop smoking   -nicotine patch 7      case discussed with Dr Guardado  spoke with wife Guillaume [phone]

## 2020-03-21 NOTE — CHART NOTE - NSCHARTNOTEFT_GEN_A_CORE
60 year old male with PMH of HLD and PSH of appendectomy presented to the ED with complaint of unwitnessed syncopal episode x2. 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    ACTH stimulation test results reviewed. Max cortisol level was 10.1. TT4 also low to 4.4. Plan for discharge today as per primary team.     VITAL SIGNS  T(F): 97.9 (21 Mar 2020 11:13), Max: 98.8 (20 Mar 2020 20:14)  HR: 64 (21 Mar 2020 11:13) (59 - 91)  BP: 101/70 (21 Mar 2020 11:13) (96/68 - 108/52)  RR: 18 (21 Mar 2020 11:13) (16 - 18)  SpO2: 98% (21 Mar 2020 11:13) (96% - 100%)     LABS:             9.0    17.83 )-----------( 86       ( 20 Mar 2020 07:20 )             27.7       03-21    140  |  104  |  21<H>  ----------------------------<  73  4.6   |  27  |  1.25    Ca    9.0      21 Mar 2020 06:47  Phos  3.9     03-21  Mg     2.6     03-21          TT4 4.4    Assessment and Plan:     1. 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 Sellar mass  -adenoma vs meningioma?   -neurology follow up  -outpatient ophthalmology evaluation for formal visual field testing    3. Syncope  -likely vasovagal  -orthostatics  -stress test negative      Plan discussed with primary team  Please  call  w/ any questions or concerns [phone] 60 year old male with PMH of HLD and PSH of appendectomy presented to the ED with complaint of unwitnessed syncopal episode x2. 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    ACTH stimulation test results reviewed. Max cortisol level was 10.1. TT4 also low to 4.4. Plan for discharge today as per primary team.     VITAL SIGNS  T(F): 97.9 (21 Mar 2020 11:13), Max: 98.8 (20 Mar 2020 20:14)  HR: 64 (21 Mar 2020 11:13) (59 - 91)  BP: 101/70 (21 Mar 2020 11:13) (96/68 - 108/52)  RR: 18 (21 Mar 2020 11:13) (16 - 18)  SpO2: 98% (21 Mar 2020 11:13) (96% - 100%)     LABS:             9.0    17.83 )-----------( 86       ( 20 Mar 2020 07:20 )             27.7       03-21    140  |  104  |  21<H>  ----------------------------<  73  4.6   |  27  |  1.25    Ca    9.0      21 Mar 2020 06:47  Phos  3.9     03-21  Mg     2.6     03-21          TT4 4.4    Assessment and Plan:     1. 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 Sellar mass  -adenoma vs meningioma?   -neurology follow up  -outpatient ophthalmology evaluation for formal visual field testing    3. Syncope  -likely vasovagal  -orthostatics  -stress test negative      Plan discussed with primary team  Please  call  w/ any questions or concerns [phone]

## 2020-03-21 NOTE — PROGRESS NOTE ADULT - SUBJECTIVE AND OBJECTIVE BOX
pt seen and examined, no complaints, ROS - .          acetaminophen   Tablet .. 650 milliGRAM(s) Oral every 6 hours PRN  ergocalciferol 40142 Unit(s) Oral <User Schedule>                            9.0    17.83 )-----------( 86       ( 20 Mar 2020 07:20 )             27.7       Hemoglobin: 9.0 g/dL (03-20 @ 07:20)  Hemoglobin: 9.3 g/dL (03-19 @ 08:20)  Hemoglobin: 8.8 g/dL (03-18 @ 05:55)  Hemoglobin: 11.3 g/dL (03-17 @ 19:59)      03-21    140  |  104  |  21<H>  ----------------------------<  73  4.6   |  27  |  1.25    Ca    9.0      21 Mar 2020 06:47  Phos  3.9     03-21  Mg     2.6     03-21    TPro  6.0  /  Alb  3.1<L>  /  TBili  0.3  /  DBili  x   /  AST  36  /  ALT  29  /  AlkPhos  42  03-19    Creatinine Trend: 1.25<--, 1.18<--, 1.05<--, 0.98<--, 1.31<--    COAGS:           T(C): 37.1 (03-21-20 @ 07:42), Max: 37.1 (03-20-20 @ 20:14)  HR: 59 (03-21-20 @ 07:42) (59 - 91)  BP: 98/61 (03-21-20 @ 07:42) (96/68 - 108/52)  RR: 16 (03-21-20 @ 07:42) (16 - 18)  SpO2: 99% (03-21-20 @ 07:42) (96% - 100%)  Wt(kg): --    I&O's Summary    20 Mar 2020 07:01  -  21 Mar 2020 07:00  --------------------------------------------------------  IN: 300 mL / OUT: 0 mL / NET: 300 mL      Gen: Appears well in NAD  HEENT:  (-)icterus (-)pallor  CV: N S1 S2 1/6 KELLY (+)2 Pulses B/l  Resp:  Clear to ausculatation B/L, normal effort  GI: (+) BS Soft, NT, ND  Lymph:  (-)Edema, (-)obvious lymphadenopathy  Skin: Warm to touch, Normal turgor  Psych: Appropriate mood and affect      ASSESSMENT/PLAN: 	60y  Male PMHx of HLP (on simvastatin) , presents to the ED with complaints of unwitnessed syncopal episode x2.     - orthostatic vs resolved post IV fluids   - ECHO noted   - Stress echo today revealed normal augmentation in LV fx and BP response  - Smoking cessation stressed  - Remainder of cardiac w/u as an outpt   no s/s of chf on exam

## 2020-03-23 LAB
CULTURE RESULTS: SIGNIFICANT CHANGE UP
CULTURE RESULTS: SIGNIFICANT CHANGE UP
SPECIMEN SOURCE: SIGNIFICANT CHANGE UP
SPECIMEN SOURCE: SIGNIFICANT CHANGE UP

## 2020-03-24 PROBLEM — Z00.00 ENCOUNTER FOR PREVENTIVE HEALTH EXAMINATION: Status: ACTIVE | Noted: 2020-03-24

## 2020-04-17 NOTE — CHART NOTE - NSCHARTNOTEFT_GEN_A_CORE
Spoke with Wife Helen 2 days ago and yesterday requested prescription for levothyroxine as unable to reach PCP.   I put through for one month of Levothyroxine. Patient is doing well.   She was also concerned about upcoming appt with Endo being cancelled

## 2020-04-18 RX ORDER — LEVOTHYROXINE SODIUM 125 MCG
1 TABLET ORAL
Qty: 30 | Refills: 0
Start: 2020-04-18 | End: 2020-05-17

## 2020-04-18 RX ORDER — LEVOTHYROXINE SODIUM 150 MCG
1 TABLET ORAL
Qty: 30 | Refills: 0 | DISCHARGE
Start: 2020-04-18 | End: 2020-05-17

## 2020-04-20 ENCOUNTER — APPOINTMENT (OUTPATIENT)
Dept: ENDOCRINOLOGY | Facility: CLINIC | Age: 60
End: 2020-04-20
Payer: COMMERCIAL

## 2020-04-20 VITALS
HEART RATE: 73 BPM | SYSTOLIC BLOOD PRESSURE: 112 MMHG | WEIGHT: 143 LBS | OXYGEN SATURATION: 98 % | HEIGHT: 64 IN | DIASTOLIC BLOOD PRESSURE: 75 MMHG | TEMPERATURE: 98.7 F | RESPIRATION RATE: 16 BRPM | BODY MASS INDEX: 24.41 KG/M2

## 2020-04-20 DIAGNOSIS — Z80.0 FAMILY HISTORY OF MALIGNANT NEOPLASM OF DIGESTIVE ORGANS: ICD-10-CM

## 2020-04-20 DIAGNOSIS — Z87.891 PERSONAL HISTORY OF NICOTINE DEPENDENCE: ICD-10-CM

## 2020-04-20 DIAGNOSIS — Z80.1 FAMILY HISTORY OF MALIGNANT NEOPLASM OF TRACHEA, BRONCHUS AND LUNG: ICD-10-CM

## 2020-04-20 PROCEDURE — 99204 OFFICE O/P NEW MOD 45 MIN: CPT

## 2020-04-20 RX ORDER — SIMVASTATIN 20 MG/1
20 TABLET, FILM COATED ORAL
Refills: 0 | Status: ACTIVE | COMMUNITY

## 2020-04-20 NOTE — REASON FOR VISIT
[Initial Evaluation] : an initial evaluation [Pituitary Evaluation/ Disorder] : pituitary evaluation/disorder [Hypothyroidism] : hypothyroidism

## 2020-04-20 NOTE — ASSESSMENT
[Sick-Day Management] : sick-day management [FreeTextEntry1] : 1. Pituitary adenoma\par -Patient with sellar adenoma now with panhypopituitarism on recent blood work\par -pending ophthalmology evaluation of visual fields \par -pending neurology as may need neurosurgical evaluation given optic nerve elevation\par -above evaluations on hold due to  COVID 19 pandemic\par \par 2. Central adrenal insufficiency\par -patient failed ACTH stimulation test\par -now asymptomatic\par -continue HC 10mg in AM and 5mg in PM\par -patient counseled to double or triple dose of HC if sick and patient counseled on importance of medication compliance \par \par 3. Central hypothyroidism\par -patient is clinically euthyroid\par -continue levothyroxine 50mcg PO daily\par -repeat TFTs now\par -patient counseled on how to take the medication, first thing in the morning, on an empty stomach, wait at least 1 hr to eat, and do not take with any other medications \par -patient counseled on importance of medication compliance

## 2020-04-20 NOTE — REVIEW OF SYSTEMS
[Fatigue] : no fatigue [Dysphagia] : no dysphagia [Decreased Appetite] : appetite not decreased [Visual Field Defect] : no visual field defect [Dysphonia] : no dysphonia [Chest Pain] : no chest pain [Neck Pain] : no neck pain [Fast Heart Rate] : heart rate is not fast [Slow Heart Rate] : heart rate is not slow [Palpitations] : no palpitations [Shortness Of Breath] : no shortness of breath [Cough] : no cough [Vomiting] : no vomiting [Constipation] : no constipation [Nausea] : no nausea [Diarrhea] : no diarrhea [Incontinence] : no incontinence [Dysuria] : no dysuria [Muscle Weakness] : no muscle weakness [Joint Pain] : no joint pain [Hair Loss] : no hair loss [Dry Skin] : no dry skin [Headaches] : no headaches [Tremors] : no tremors [Dizziness] : no dizziness [Depression] : no depression [Heat Intolerance] : no heat intolerance [Cold Intolerance] : no cold intolerance [Swelling] : no swelling

## 2020-04-20 NOTE — HISTORY OF PRESENT ILLNESS
[FreeTextEntry1] : 60 year old male with PMH of HLD and recently diagnosed sellar adenoma with adrenal insufficiency and central hypothyroidism presented for evaluation of pituitary adenoma. \par \par The patient was recently admitted to [facility] for syncopal episode in 3/2020. Endocrinology was consulted while inpatient for evaluation of sellar adenoma and concern for adrenal insufficiency given hypotension. Random cortisol level was 1.6.  The patient had insufficient response to ACTH stimulation test (max cortisol 10.1) and was started on HC 10mg in AM and 5mg in PM. HE was also started on levothyroxine 50mcg PO daily for likely central hypothyroidism given total T4 of 4.4. \par \par MRI of the brain was significant for an enhancing mass filling and expanding the sella turcica measuring 1.5 x 1.2 x 1.5cm and encroaching/elevating the optic chiasm. He reports a history of headaches > 1 year ago which have now resolved.\par He feels well overall with good energy levels. He denies tremors, palpitations, lightheadedness, or dizziness. \par \par He is pending neurology and ophthalmology appointments (on hold due to COVID19 pandemic). \par \par Results of blood work during hospitalization as below: \par prolactin 13.6 (without dilution)\par TSH 1.56\par FT4 0.4\par TT4 4.4\par Testosterone <2.5\par IGF1 51\par FSH 0.7\par LH < 0.3

## 2020-04-22 LAB
PROLACTIN SERPL-MCNC: 20.9 NG/ML
T4 FREE SERPL-MCNC: 1 NG/DL
TSH SERPL-ACNC: 0.21 UIU/ML

## 2020-04-27 ENCOUNTER — APPOINTMENT (OUTPATIENT)
Dept: NEUROLOGY | Facility: CLINIC | Age: 60
End: 2020-04-27
Payer: COMMERCIAL

## 2020-04-27 PROCEDURE — 99203 OFFICE O/P NEW LOW 30 MIN: CPT | Mod: 95

## 2020-04-27 NOTE — HISTORY OF PRESENT ILLNESS
[Patient] : the patient [FreeTextEntry4] : JUAN [FreeTextEntry1] : CONSENT\par Following verbal informed consent that reassured safety, security , privacy of records and gave notice that no part of the video audio communication will be recorded, and that insurance-ruled copays or deductibles are likely to accrue, an audio/video link was established with an approved carrier. This is being done because a face-to-face encounter is inadvisable for non-urgent conditions during the Covid-19 pandemic emergency.\par \par Discharged from [facility] where he had been admitted for syncope,and a pituitary mass was discovered on CT scan. He was referred to ophthalmology, endocrinology and neurology. The endocrinology note is reviewed. \par He is asymptomatic. He has not been able to see an ophthalmologist.

## 2020-04-27 NOTE — DISCUSSION/SUMMARY
[FreeTextEntry1] : Pituitary mass with cortisol and thyroid hypofunction on replacement cortisol and levothyroxine. He needs a dedicated pituitary MRI w/wo contrast for evaluation of need for surgical intervention either now or in the future. Also needs a Goldmann Perimetry for visual fields\par Will arrange outpatient MRI in a 3T MRI

## 2020-04-27 NOTE — PHYSICAL EXAM
[FreeTextEntry1] : Patient is well appearing\par Neurological Examination:\par Mental Status: Patient is awake and alert. Oriented to person, place, time. \par Language: No aphasia or paraphasic errors.\par Cranial Nerves:\par Visual fields by self-check appear to demonstrate good lateral peripheral vision.\par EOMI, No facial weakness, tongue protrudes in the midline, facial sensation intact\par Motor Exam: No pronator drift. . Fine motor movements intact in hands\par Able to stand up from chair without difficulty\par Sensory: normal\par Reflexes: Unable to examine\par Cerebellar: Finger to nose intact bilaterally,\par Gait: Normal stance and steps\par \par

## 2020-05-18 ENCOUNTER — APPOINTMENT (OUTPATIENT)
Dept: NEUROLOGY | Facility: CLINIC | Age: 60
End: 2020-05-18
Payer: COMMERCIAL

## 2020-05-18 PROCEDURE — 99215 OFFICE O/P EST HI 40 MIN: CPT | Mod: 95

## 2020-05-18 NOTE — DISCUSSION/SUMMARY
[FreeTextEntry1] : Called Dr Roger Cordova and arranged for his visual fields appointment; called Dr Jerome Lindsay and arranged neurosurgery appointment; explained the needs for these appointments based on the MRI observation of pressure on the optic chiasm;

## 2020-05-18 NOTE — PHYSICAL EXAM
[FreeTextEntry1] : Patient is well appearing\par Neurological Examination:\par Mental Status: Patient is awake and alert. Oriented to person, place, time. \par Language: No aphasia or paraphasic errors.\par Cranial Nerves:\par Checked peripheral temporal fields: he is able to view his own fingers >180 degrees either side\par EOMI, No facial weakness, tongue protrudes in the midline, facial sensation intact\par Motor Exam: No pronator drift. Fine motor movements intact in hands\par Able to stand up from chair without difficulty\par Sensory: normal\par Reflexes: Unable to examine\par Cerebellar: Finger to nose intact bilaterally,\par Gait: Wife assisted: normal stance steps and tandem\par

## 2020-05-18 NOTE — HISTORY OF PRESENT ILLNESS
[Patient] : the patient [FreeTextEntry4] : JUAN [FreeTextEntry1] : CONSENT\par Following verbal informed consent that reassured safety, security , privacy of records and gave notice that no part of the video audio communication will be recorded, and that insurance-ruled copays or deductibles are likely to accrue, an audio/video link was established with an approved carrier. This is being done because a face-to-face encounter is inadvisable for non-urgent conditions during the Covid-19 pandemic emergency.\par He is at home and I am conducting the appointment from my office

## 2020-05-26 ENCOUNTER — APPOINTMENT (OUTPATIENT)
Dept: NEUROSURGERY | Facility: CLINIC | Age: 60
End: 2020-05-26
Payer: COMMERCIAL

## 2020-05-26 VITALS
DIASTOLIC BLOOD PRESSURE: 90 MMHG | BODY MASS INDEX: 25.61 KG/M2 | HEIGHT: 64 IN | HEART RATE: 86 BPM | WEIGHT: 150 LBS | SYSTOLIC BLOOD PRESSURE: 150 MMHG

## 2020-05-26 PROCEDURE — 99204 OFFICE O/P NEW MOD 45 MIN: CPT

## 2020-06-02 NOTE — PHYSICAL EXAM
[General Appearance - In No Acute Distress] : in no acute distress [General Appearance - Alert] : alert [General Appearance - Well Nourished] : well nourished [General Appearance - Well Developed] : well developed [Oriented To Time, Place, And Person] : oriented to person, place, and time [Impaired Insight] : insight and judgment were intact [Affect] : the affect was normal [Person] : oriented to person [Place] : oriented to place [Time] : oriented to time [Short Term Intact] : short term memory intact [Remote Intact] : remote memory intact [Registration Intact] : recent registration memory intact [Span Intact] : the attention span was normal [Concentration Intact] : normal concentrating ability [Fluency] : fluency intact [Cranial Nerves Oculomotor (III)] : extraocular motion intact [Cranial Nerves Optic (II)] : visual acuity intact bilaterally,  pupils equal round and reactive to light [Cranial Nerves Trigeminal (V)] : facial sensation intact symmetrically [Cranial Nerves Facial (VII)] : face symmetrical [Cranial Nerves Vestibulocochlear (VIII)] : hearing was intact bilaterally [Motor Strength] : muscle strength was normal in all four extremities [Motor Tone] : muscle tone was normal in all four extremities [Involuntary Movements] : no involuntary movements were seen [No Muscle Atrophy] : normal bulk in all four extremities [Sensation Tactile Decrease] : light touch was intact [Balance] : balance was intact [2+] : Patella left 2+ [Intact] : all sensory within normal limits bilaterally [Sclera] : the sclera and conjunctiva were normal [Extraocular Movements] : extraocular movements were intact [PERRL With Normal Accommodation] : pupils were equal in size, round, reactive to light, with normal accommodation [Full Visual Field] : full visual field [Hearing Threshold Finger Rub Not [place]] : hearing was normal [Outer Ear] : the ears and nose were normal in appearance [Both Tympanic Membranes Were Examined] : both tympanic membranes were normal [Neck Appearance] : the appearance of the neck was normal [Abnormal Walk] : normal gait [Musculoskeletal - Swelling] : no joint swelling seen [Skin Color & Pigmentation] : normal skin color and pigmentation [] : no rash [Skin Turgor] : normal skin turgor [Limited Balance] : balance was intact [Tremor] : no tremor present

## 2020-06-02 NOTE — ASSESSMENT
[FreeTextEntry1] : 60 year old male with pituitary adenoma.\par there is minimal impingement upon the optic apparatus and visual fields are full to confrontational testing\par Normal VFT, neurologically intact\par \par Plan:\par - MRI pituitary with and without contrast in 1 month\par formal visual field testing in 6 months

## 2020-06-02 NOTE — HISTORY OF PRESENT ILLNESS
[< 3 months] : less than 3 months [de-identified] : 60 years old male with a PMHx of HLP (on simvastatin) and a significant PSHx of appendectomy, presents to the ED in March 2020 with complaints of unwitnessed syncopal episode x2. Patient reports he ate some pizza and then vomited. Notes when he got up, he felt a room spinning sensation, ear ringing and passed out. Notes no other symptoms after passing out. Patient states he then again had a second syncopal episode when he got up quickly from the first. Notes his has happened in the past which improved with rehydration. Patient wife states that second episode was witnessed by her. Patient LOC was 1-2 minutes and patient was asymptomatic after the episode. Patient endorses mild upset stomach. Patient denies incontinence, chest pain, shortness of breath, fever, cough, burning urination or any other symptoms.\par \par He had CT and MRI done and it was noted with 1.7 cm pituitary adenoma. He is neurologically intact. He complains of dizziness and vertigo at times. He has been evaluated by ophthalmologist, neurologist and endocrinologist.

## 2020-06-02 NOTE — DATA REVIEWED
[de-identified] : 1.7 cm pituitary adenoma Detail Level: Zone Other (Free Text): Pt is using a leg brace, she thinks this is causing the irrigation. She noticed this is improving with having a break from wearing the brace. Note Text (......Xxx Chief Complaint.): This diagnosis correlates with the

## 2020-06-04 ENCOUNTER — APPOINTMENT (OUTPATIENT)
Dept: ENDOCRINOLOGY | Facility: CLINIC | Age: 60
End: 2020-06-04

## 2020-06-11 ENCOUNTER — APPOINTMENT (OUTPATIENT)
Dept: ENDOCRINOLOGY | Facility: CLINIC | Age: 60
End: 2020-06-11
Payer: COMMERCIAL

## 2020-06-11 VITALS
WEIGHT: 153.53 LBS | SYSTOLIC BLOOD PRESSURE: 127 MMHG | TEMPERATURE: 98.9 F | HEART RATE: 72 BPM | HEIGHT: 63.39 IN | OXYGEN SATURATION: 99 % | DIASTOLIC BLOOD PRESSURE: 85 MMHG | BODY MASS INDEX: 26.87 KG/M2

## 2020-06-11 PROCEDURE — 99204 OFFICE O/P NEW MOD 45 MIN: CPT | Mod: 25

## 2020-06-11 PROCEDURE — 36415 COLL VENOUS BLD VENIPUNCTURE: CPT

## 2020-06-11 NOTE — REASON FOR VISIT
[Initial Evaluation] : an initial evaluation [Hypothyroidism] : hypothyroidism [Pituitary Evaluation/ Disorder] : pituitary evaluation/disorder

## 2020-06-11 NOTE — ASSESSMENT
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 Sick day rules were reviewed. He was advised to purchase a medic alert bracelet or use Medical ID.\par Check prolactin, LH, FSH, IGF 1, total testosterone.\par Further management will be some these results.\par Management was also reviewed with his wife on the phone per patient request  during this visit.

## 2020-06-11 NOTE — HISTORY OF PRESENT ILLNESS
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 He was found to have hypotension and a low cortisol level of 1.6. ACTH stimulation test showed suboptimal cortisol response (10.1.). He was started on hydrocortisone. He is currently taking hydrocortisone 10 mg in the morning and 5 mg at 4 PM. He is on LT4 50mcg qd. \par He had a followup with neurosurgery in May 2020 as the pituitary adenoma is encroaching and elevating the optic chiasm. No surgery is planned at this time. Per patient, visual field tests were normal.\par

## 2020-06-11 NOTE — REVIEW OF SYSTEMS
[All other systems negative] : All other systems negative [de-identified] : one episode of lightheadedness

## 2020-06-11 NOTE — PHYSICAL EXAM
[Alert] : alert [Well Nourished] : well nourished [Healthy Appearance] : healthy appearance [No Acute Distress] : no acute distress [Well Developed] : well developed [Normal Voice/Communication] : normal voice communication [Normal Sclera/Conjunctiva] : normal sclera/conjunctiva [No Neck Mass] : no neck mass was observed [No Proptosis] : no proptosis [No LAD] : no lymphadenopathy [Thyroid Not Enlarged] : the thyroid was not enlarged [No Thyroid Nodules] : no palpable thyroid nodules [No Respiratory Distress] : no respiratory distress [No Accessory Muscle Use] : no accessory muscle use [Normal Rate] : heart rate was normal [Normal Rate and Effort] : normal respiratory rate and effort [Spine Straight] : spine straight [Normal Gait] : normal gait [No Stigmata of Cushings Syndrome] : no stigmata of Cushings Syndrome [No Involuntary Movements] : no involuntary movements were seen [No Clubbing, Cyanosis] : no clubbing  or cyanosis of the fingernails [No Tremors] : no tremors [Normal Affect] : the affect was normal [Normal Insight/Judgement] : insight and judgment were intact [Normal Mood] : the mood was normal

## 2020-06-12 LAB
CHOLEST SERPL-MCNC: 217 MG/DL
CHOLEST/HDLC SERPL: 2.7 RATIO
FSH SERPL-MCNC: 0.9 IU/L
HDLC SERPL-MCNC: 81 MG/DL
IGF-1 INTERP: NORMAL
IGF-I BLD-MCNC: 127 NG/ML
LDLC SERPL CALC-MCNC: 106 MG/DL
LH SERPL-ACNC: <0.3 IU/L
PROLACTIN SERPL-MCNC: 20.8 NG/ML
T4 FREE SERPL-MCNC: 0.9 NG/DL
TRIGL SERPL-MCNC: 152 MG/DL

## 2020-06-23 LAB
TESTOST BND SERPL-MCNC: <0.2 PG/ML
TESTOST SERPL-MCNC: <2.5 NG/DL

## 2020-07-01 LAB
PROLACTIN SERPL-MCNC: 18.2 NG/ML
PROLACTIN SERPL-MCNC: 19.1 NG/ML

## 2020-10-08 ENCOUNTER — APPOINTMENT (OUTPATIENT)
Dept: ENDOCRINOLOGY | Facility: CLINIC | Age: 60
End: 2020-10-08

## 2020-12-10 ENCOUNTER — LABORATORY RESULT (OUTPATIENT)
Age: 60
End: 2020-12-10

## 2020-12-10 ENCOUNTER — APPOINTMENT (OUTPATIENT)
Dept: ENDOCRINOLOGY | Facility: CLINIC | Age: 60
End: 2020-12-10
Payer: COMMERCIAL

## 2020-12-10 VITALS
BODY MASS INDEX: 26.83 KG/M2 | DIASTOLIC BLOOD PRESSURE: 69 MMHG | HEIGHT: 64.17 IN | WEIGHT: 157.16 LBS | OXYGEN SATURATION: 99 % | TEMPERATURE: 96.6 F | HEART RATE: 77 BPM | SYSTOLIC BLOOD PRESSURE: 101 MMHG

## 2020-12-10 PROCEDURE — 36415 COLL VENOUS BLD VENIPUNCTURE: CPT

## 2020-12-10 PROCEDURE — 99215 OFFICE O/P EST HI 40 MIN: CPT | Mod: 25

## 2020-12-10 PROCEDURE — 99072 ADDL SUPL MATRL&STAF TM PHE: CPT

## 2020-12-11 DIAGNOSIS — C91.10 CHRONIC LYMPHOCYTIC LEUKEMIA OF B-CELL TYPE NOT HAVING ACHIEVED REMISSION: ICD-10-CM

## 2020-12-11 LAB
ALBUMIN SERPL ELPH-MCNC: 4.8 G/DL
ALP BLD-CCNC: 63 U/L
ALT SERPL-CCNC: 13 U/L
ANION GAP SERPL CALC-SCNC: 12 MMOL/L
AST SERPL-CCNC: 22 U/L
BILIRUB SERPL-MCNC: 0.2 MG/DL
BUN SERPL-MCNC: 33 MG/DL
CALCIUM SERPL-MCNC: 10.1 MG/DL
CHLORIDE SERPL-SCNC: 106 MMOL/L
CO2 SERPL-SCNC: 26 MMOL/L
CREAT SERPL-MCNC: 1.51 MG/DL
FSH SERPL-MCNC: 0.8 IU/L
GLUCOSE SERPL-MCNC: 96 MG/DL
IGF-1 INTERP: NORMAL
IGF-I BLD-MCNC: 95 NG/ML
LH SERPL-ACNC: 0.3 IU/L
POTASSIUM SERPL-SCNC: 3.7 MMOL/L
PROT SERPL-MCNC: 6.9 G/DL
SODIUM SERPL-SCNC: 143 MMOL/L
T3 SERPL-MCNC: 71 NG/DL
T4 FREE SERPL-MCNC: 0.9 NG/DL

## 2020-12-14 LAB
BASOPHILS # BLD AUTO: 0 K/UL
BASOPHILS NFR BLD AUTO: 0 %
EOSINOPHIL # BLD AUTO: 0 K/UL
EOSINOPHIL NFR BLD AUTO: 0 %
HCT VFR BLD CALC: 39.4 %
HGB BLD-MCNC: 12 G/DL
LYMPHOCYTES # BLD AUTO: 51.58 K/UL
LYMPHOCYTES NFR BLD AUTO: 89 %
MAN DIFF?: NORMAL
MCHC RBC-ENTMCNC: 28.9 PG
MCHC RBC-ENTMCNC: 30.5 GM/DL
MCV RBC AUTO: 94.9 FL
MONOCYTES # BLD AUTO: 2.32 K/UL
MONOCYTES NFR BLD AUTO: 4 %
NEUTROPHILS # BLD AUTO: 2.32 K/UL
NEUTROPHILS NFR BLD AUTO: 4 %
PLATELET # BLD AUTO: 135 K/UL
PROLACTIN SERPL-MCNC: 19.1 NG/ML
PROLACTIN SERPL-MCNC: 19.2 NG/ML
RBC # BLD: 4.15 M/UL
RBC # FLD: 14.2 %
WBC # FLD AUTO: 57.96 K/UL

## 2020-12-14 NOTE — HISTORY OF PRESENT ILLNESS
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 in March 2020 after he had a syncopal episode. He was found to have hypotension and a low cortisol level of 1.6. ACTH stimulation test showed suboptimal cortisol response (10.1.). He was started on hydrocortisone. He is currently taking hydrocortisone 10 mg in the morning and 5 mg at 4 PM. He is on LT4 50mcg qd. \par He had a followup with neurosurgery in May 2020 as the pituitary adenoma is encroaching and elevating the optic chiasm. He has not seen neurosurgery for follow up. \par He states he had visual field testing one week ago which was normal. \par He reports occasional blurry vision and headaches. \par

## 2020-12-14 NOTE — REVIEW OF SYSTEMS
[Blurred Vision] : blurred vision [Headaches] : headaches [All other systems negative] : All other systems negative

## 2020-12-14 NOTE — PHYSICAL EXAM
[Alert] : alert [Well Nourished] : well nourished [Healthy Appearance] : healthy appearance [No Acute Distress] : no acute distress [Well Developed] : well developed [Normal Voice/Communication] : normal voice communication [Normal Sclera/Conjunctiva] : normal sclera/conjunctiva [No Proptosis] : no proptosis [No Neck Mass] : no neck mass was observed [No LAD] : no lymphadenopathy [Thyroid Not Enlarged] : the thyroid was not enlarged [No Thyroid Nodules] : no palpable thyroid nodules [No Respiratory Distress] : no respiratory distress [No Accessory Muscle Use] : no accessory muscle use [Normal Rate and Effort] : normal respiratory rate and effort [Normal Rate] : heart rate was normal [Spine Straight] : spine straight [No Stigmata of Cushings Syndrome] : no stigmata of Cushings Syndrome [Normal Gait] : normal gait [No Clubbing, Cyanosis] : no clubbing  or cyanosis of the fingernails [No Involuntary Movements] : no involuntary movements were seen [No Tremors] : no tremors [Normal Affect] : the affect was normal [Normal Insight/Judgement] : insight and judgment were intact [Normal Mood] : the mood was normal

## 2020-12-14 NOTE — ASSESSMENT
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 (TSH is not accurate in central hypothyroidism). \par Continue levothyroxine 50 mcg daily pending results.\par Continue hydrocortisone 10 mg in the morning and 5 mg at 4 PM. Sick day rules were reviewed. He was advised to purchase a medic alert bracelet or use Medical ID demar.\par Check prolactin, LH, FSH, IGF 1, total testosterone.\par Check pituitary MRI. \par F/u with neurosurgery advised.

## 2020-12-18 LAB
TESTOST BND SERPL-MCNC: <0.2 PG/ML
TESTOST SERPL-MCNC: <2.5 NG/DL

## 2020-12-21 RX ORDER — LEVOTHYROXINE SODIUM 0.05 MG/1
50 TABLET ORAL DAILY
Qty: 90 | Refills: 2 | Status: DISCONTINUED | COMMUNITY
Start: 2020-04-20 | End: 2020-12-21

## 2021-01-05 ENCOUNTER — APPOINTMENT (OUTPATIENT)
Dept: NEUROLOGY | Facility: CLINIC | Age: 61
End: 2021-01-05
Payer: COMMERCIAL

## 2021-01-05 VITALS
DIASTOLIC BLOOD PRESSURE: 84 MMHG | HEIGHT: 64 IN | TEMPERATURE: 96 F | BODY MASS INDEX: 27.14 KG/M2 | WEIGHT: 159 LBS | SYSTOLIC BLOOD PRESSURE: 122 MMHG | HEART RATE: 82 BPM | OXYGEN SATURATION: 99 %

## 2021-01-05 PROCEDURE — 99214 OFFICE O/P EST MOD 30 MIN: CPT

## 2021-01-05 PROCEDURE — 99072 ADDL SUPL MATRL&STAF TM PHE: CPT

## 2021-01-05 NOTE — DISCUSSION/SUMMARY
[FreeTextEntry1] : Reviewed new MRI of brain - reported smaller size of pituitary with resolution of hematoma. 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 On hydrocortisone and levothyroxine replacement. Follow up as needed after follow up with endocrine and hematology

## 2021-01-05 NOTE — HISTORY OF PRESENT ILLNESS
[FreeTextEntry1] : Feels well. Had Visual Field with Dr Roger Cordova -reportedly normal; visited neurosurgeon Dr Jerome Lindsay and followed by endocrinology. CLL followed by  Dr Thorne. Had repeat MRI pituitary. Denies headache and syncope

## 2021-01-12 ENCOUNTER — APPOINTMENT (OUTPATIENT)
Dept: NEUROSURGERY | Facility: CLINIC | Age: 61
End: 2021-01-12
Payer: COMMERCIAL

## 2021-01-12 VITALS
HEART RATE: 86 BPM | OXYGEN SATURATION: 99 % | DIASTOLIC BLOOD PRESSURE: 88 MMHG | SYSTOLIC BLOOD PRESSURE: 134 MMHG | BODY MASS INDEX: 27.14 KG/M2 | RESPIRATION RATE: 15 BRPM | HEIGHT: 64 IN | WEIGHT: 159 LBS

## 2021-01-12 PROCEDURE — 99072 ADDL SUPL MATRL&STAF TM PHE: CPT

## 2021-01-12 PROCEDURE — 99214 OFFICE O/P EST MOD 30 MIN: CPT

## 2021-01-15 NOTE — ASSESSMENT
[FreeTextEntry1] : 60 year old male with pituitary adenoma. Normal VFT, neurologically intact MRI noted with a slight decrease in szie of tumor (1.6 cm to 1.4cm)\par \par Plan:\par - MRI pituitary with and without contrast in 1 year\par - Follow up with endocrine\par - VFT in April\par - Return after MRI

## 2021-01-15 NOTE — HISTORY OF PRESENT ILLNESS
[< 3 months] : less than 3 months [de-identified] : 61 years old male with a PMHx of HLP (on simvastatin) and a significant PSHx of appendectomy, presents to the ED in March 2020 with complaints of unwitnessed syncopal episode x2. Patient reports he ate some pizza and then vomited. Notes when he got up, he felt a room spinning sensation, ear ringing and passed out. Notes no other symptoms after passing out. Patient states he then again had a second syncopal episode when he got up quickly from the first. Notes his has happened in the past which improved with rehydration. Patient wife states that second episode was witnessed by her. Patient LOC was 1-2 minutes and patient was asymptomatic after the episode. Patient endorses mild upset stomach. Patient denies incontinence, chest pain, shortness of breath, fever, cough, burning urination or any other symptoms.\par \par He had CT and MRI done in May 2020 and it was noted with 1.6 cm pituitary adenoma. He is neurologically intact. . He has been evaluated by ophthalmologist, neurologist and endocrinologist. He returns today with a repeat MRI brain. \par \par He reports having headaches at times and tinnitus but denied visual difficulty or dizziness

## 2021-01-15 NOTE — PHYSICAL EXAM
[General Appearance - Alert] : alert [General Appearance - In No Acute Distress] : in no acute distress [General Appearance - Well Nourished] : well nourished [General Appearance - Well Developed] : well developed [Oriented To Time, Place, And Person] : oriented to person, place, and time [Impaired Insight] : insight and judgment were intact [Affect] : the affect was normal [Person] : oriented to person [Place] : oriented to place [Time] : oriented to time [Short Term Intact] : short term memory intact [Remote Intact] : remote memory intact [Registration Intact] : recent registration memory intact [Span Intact] : the attention span was normal [Cranial Nerves Optic (II)] : visual acuity intact bilaterally,  pupils equal round and reactive to light [Cranial Nerves Oculomotor (III)] : extraocular motion intact [Cranial Nerves Trigeminal (V)] : facial sensation intact symmetrically [Cranial Nerves Facial (VII)] : face symmetrical [Cranial Nerves Vestibulocochlear (VIII)] : hearing was intact bilaterally [Cranial Nerves Glossopharyngeal (IX)] : tongue and palate midline [Cranial Nerves Accessory (XI - Cranial And Spinal)] : head turning and shoulder shrug symmetric [Cranial Nerves Hypoglossal (XII)] : there was no tongue deviation with protrusion [Motor Tone] : muscle tone was normal in all four extremities [Motor Strength] : muscle strength was normal in all four extremities [Involuntary Movements] : no involuntary movements were seen [No Muscle Atrophy] : normal bulk in all four extremities [Sensation Tactile Decrease] : light touch was intact [Balance] : balance was intact [2+] : Patella left 2+ [No Visual Abnormalities] : no visible abnormalities [Sclera] : the sclera and conjunctiva were normal [PERRL With Normal Accommodation] : pupils were equal in size, round, reactive to light, with normal accommodation [Extraocular Movements] : extraocular movements were intact [Full Visual Field] : full visual field [Outer Ear] : the ears and nose were normal in appearance [Hearing Threshold Finger Rub Not [place]] : hearing was normal [Neck Appearance] : the appearance of the neck was normal [Neck Cervical Mass (___cm)] : no neck mass was observed [Exaggerated Use Of Accessory Muscles For Inspiration] : no accessory muscle use [Edema] : there was no peripheral edema [Bowel Sounds] : normal bowel sounds [Abdomen Tenderness] : non-tender [No Spinal Tenderness] : no spinal tenderness [Abnormal Walk] : normal gait [Nail Clubbing] : no clubbing  or cyanosis of the fingernails [Musculoskeletal - Swelling] : no joint swelling seen [Skin Color & Pigmentation] : normal skin color and pigmentation [] : no rash [Fluency] : fluency intact [Current Events] : adequate knowledge of current events [Romberg's Sign] : Romberg's sign was negtive [Allodynia] : no ~T allodynia present [Dysesthesia] : no dysesthesia [Limited Balance] : balance was intact [Past-pointing] : there was no past-pointing [Tremor] : no tremor present [Dysdiadochokinesia Bilaterally] : not present [Coordination - Dysmetria Impaired Finger-to-Nose Bilateral] : not present [Coordination - Dysmetria Impaired Heel-to-Shin Bilateral] : not present [Optic Disc Abnormality] : the optic disc were normal in size and color [Heart Rate And Rhythm] : heart rate was normal and rhythm regular

## 2021-01-19 DIAGNOSIS — U07.1 COVID-19: ICD-10-CM

## 2021-03-04 ENCOUNTER — APPOINTMENT (OUTPATIENT)
Dept: ENDOCRINOLOGY | Facility: CLINIC | Age: 61
End: 2021-03-04
Payer: COMMERCIAL

## 2021-03-04 VITALS
HEART RATE: 80 BPM | BODY MASS INDEX: 27.01 KG/M2 | SYSTOLIC BLOOD PRESSURE: 135 MMHG | TEMPERATURE: 97.7 F | DIASTOLIC BLOOD PRESSURE: 88 MMHG | OXYGEN SATURATION: 98 % | WEIGHT: 156.28 LBS | HEIGHT: 63.78 IN

## 2021-03-04 LAB
ALBUMIN SERPL ELPH-MCNC: 4.6 G/DL
ALP BLD-CCNC: 68 U/L
ALT SERPL-CCNC: 18 U/L
ANION GAP SERPL CALC-SCNC: 11 MMOL/L
AST SERPL-CCNC: 25 U/L
BILIRUB SERPL-MCNC: 0.2 MG/DL
BUN SERPL-MCNC: 23 MG/DL
CALCIUM SERPL-MCNC: 9.5 MG/DL
CHLORIDE SERPL-SCNC: 104 MMOL/L
CO2 SERPL-SCNC: 26 MMOL/L
CREAT SERPL-MCNC: 1.3 MG/DL
GLUCOSE SERPL-MCNC: 85 MG/DL
POTASSIUM SERPL-SCNC: 4 MMOL/L
PROT SERPL-MCNC: 7 G/DL
SODIUM SERPL-SCNC: 141 MMOL/L

## 2021-03-04 PROCEDURE — 99072 ADDL SUPL MATRL&STAF TM PHE: CPT

## 2021-03-04 PROCEDURE — 36415 COLL VENOUS BLD VENIPUNCTURE: CPT

## 2021-03-04 PROCEDURE — 99214 OFFICE O/P EST MOD 30 MIN: CPT | Mod: 25

## 2021-03-05 LAB
FSH SERPL-MCNC: 0.9 IU/L
LH SERPL-ACNC: <0.3 IU/L
PROLACTIN SERPL-MCNC: 21.1 NG/ML
T3 SERPL-MCNC: 83 NG/DL
T4 FREE SERPL-MCNC: 1.4 NG/DL

## 2021-03-07 NOTE — HISTORY OF PRESENT ILLNESS
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 He was found to have hypotension and a low cortisol level of 1.6. ACTH stimulation test showed suboptimal cortisol response (10.1.). He was started on hydrocortisone. He is currently taking hydrocortisone 10 mg in the morning and 5 mg at 4 PM. He is on LT4 75mcg qd. \par He had a followup with neurosurgery in January 2021.  Plan is for MRI of the pituitary in January 2022.  \par He reports occasional blurry vision and headaches. \par

## 2021-03-07 NOTE — ASSESSMENT
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 (TSH is not accurate in central hypothyroidism). \par Continue levothyroxine 75 mcg daily pending results.\par Continue hydrocortisone 10 mg in the morning and 5 mg at 4 PM. Sick day rules were reviewed. He was advised to purchase a medic alert bracelet or use Medical ID demar.\par Check prolactin, LH, FSH, IGF 1, total testosterone.\par Check pituitary MRI in January 2022. \par

## 2021-03-25 LAB
TESTOST BND SERPL-MCNC: <0.2 PG/ML
TESTOST SERPL-MCNC: <2.5 NG/DL

## 2021-04-06 ENCOUNTER — NON-APPOINTMENT (OUTPATIENT)
Age: 61
End: 2021-04-06

## 2021-05-28 NOTE — PHYSICAL EXAM
[Alert] : alert [Well Nourished] : well nourished [No Acute Distress] : no acute distress [Well Developed] : well developed [Normal Sclera/Conjunctiva] : normal sclera/conjunctiva [No Proptosis] : no proptosis [EOMI] : extra ocular movement intact [Thyroid Not Enlarged] : the thyroid was not enlarged [Normal Oropharynx] : the oropharynx was normal [No Thyroid Nodules] : there were no palpable thyroid nodules [No Respiratory Distress] : no respiratory distress [Clear to Auscultation] : lungs were clear to auscultation bilaterally [No Accessory Muscle Use] : no accessory muscle use [Normal S1, S2] : normal S1 and S2 [Normal Rate] : heart rate was normal [No Edema] : no peripheral edema [Regular Rhythm] : with a regular rhythm [Pedal Pulses Normal] : the pedal pulses are present [Normal Bowel Sounds] : normal bowel sounds [Not Tender] : non-tender [Soft] : abdomen soft [Not Distended] : not distended [No Spinal Tenderness] : no spinal tenderness [Spine Straight] : spine straight [No Stigmata of Cushings Syndrome] : no stigmata of Cushings Syndrome [Normal Gait] : normal gait [Normal Strength/Tone] : muscle strength and tone were normal [No Rash] : no rash [Normal Reflexes] : deep tendon reflexes were 2+ and symmetric [No Tremors] : no tremors [Oriented x3] : oriented to person, place, and time [Acanthosis Nigricans] : no acanthosis nigricans 28-May-2021

## 2021-06-28 ENCOUNTER — NON-APPOINTMENT (OUTPATIENT)
Age: 61
End: 2021-06-28

## 2021-07-06 ENCOUNTER — NON-APPOINTMENT (OUTPATIENT)
Age: 61
End: 2021-07-06

## 2021-09-16 ENCOUNTER — APPOINTMENT (OUTPATIENT)
Dept: ENDOCRINOLOGY | Facility: CLINIC | Age: 61
End: 2021-09-16
Payer: COMMERCIAL

## 2021-09-16 VITALS
HEART RATE: 78 BPM | BODY MASS INDEX: 27.05 KG/M2 | SYSTOLIC BLOOD PRESSURE: 122 MMHG | DIASTOLIC BLOOD PRESSURE: 84 MMHG | HEIGHT: 63.78 IN | OXYGEN SATURATION: 99 % | TEMPERATURE: 96.4 F | WEIGHT: 156.53 LBS

## 2021-09-16 DIAGNOSIS — E78.5 HYPERLIPIDEMIA, UNSPECIFIED: ICD-10-CM

## 2021-09-16 PROCEDURE — 99214 OFFICE O/P EST MOD 30 MIN: CPT | Mod: 25

## 2021-09-16 PROCEDURE — 36415 COLL VENOUS BLD VENIPUNCTURE: CPT

## 2021-09-16 NOTE — ASSESSMENT
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 (TSH is not accurate in central hypothyroidism). \par Continue levothyroxine 88 mcg daily pending results.\par Continue hydrocortisone 10 mg in the morning and 5 mg at 4 PM. Sick day rules were reviewed. He was advised to purchase a medic alert bracelet or use Medical ID demar.\par Check anterior pituitary hormones.  Check prolactin.  Check testosterone.\par Referred to urology.\par Check pituitary MRI as he reports blurry vision and headache. \par

## 2021-09-16 NOTE — HISTORY OF PRESENT ILLNESS
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 He was found to have hypotension and a low cortisol level of 1.6. ACTH stimulation test showed suboptimal cortisol response (10.1.). He was started on hydrocortisone. He is currently taking hydrocortisone 10 mg in the morning and 5 mg at 4 PM. He is on LT4 88mcg qd. \par He reports occasional blurry vision and headaches. \par

## 2021-09-22 ENCOUNTER — NON-APPOINTMENT (OUTPATIENT)
Age: 61
End: 2021-09-22

## 2021-09-22 LAB
25(OH)D3 SERPL-MCNC: 29.3 NG/ML
CHOLEST SERPL-MCNC: 231 MG/DL
FRUCTOSAMINE SERPL-MCNC: 258 UMOL/L
FSH SERPL-MCNC: 0.9 IU/L
HDLC SERPL-MCNC: 44 MG/DL
IGF-1 INTERP: NORMAL
IGF-I BLD-MCNC: 83 NG/ML
LDLC SERPL CALC-MCNC: 135 MG/DL
LH SERPL-ACNC: 0.3 IU/L
NONHDLC SERPL-MCNC: 187 MG/DL
PROLACTIN SERPL-MCNC: 20.2 NG/ML
T3 SERPL-MCNC: 87 NG/DL
T4 FREE SERPL-MCNC: 1.3 NG/DL
TESTOST BND SERPL-MCNC: <0.2 PG/ML
TESTOSTERONE TOTAL S: <3 NG/DL
TRIGL SERPL-MCNC: 261 MG/DL

## 2021-10-14 ENCOUNTER — APPOINTMENT (OUTPATIENT)
Dept: MRI IMAGING | Facility: IMAGING CENTER | Age: 61
End: 2021-10-14

## 2021-11-15 ENCOUNTER — RX RENEWAL (OUTPATIENT)
Age: 61
End: 2021-11-15

## 2021-11-30 ENCOUNTER — RX RENEWAL (OUTPATIENT)
Age: 61
End: 2021-11-30

## 2022-01-13 ENCOUNTER — APPOINTMENT (OUTPATIENT)
Dept: ENDOCRINOLOGY | Facility: CLINIC | Age: 62
End: 2022-01-13

## 2022-01-20 ENCOUNTER — EMERGENCY (EMERGENCY)
Facility: HOSPITAL | Age: 62
LOS: 1 days | Discharge: HOME | End: 2022-01-20
Attending: STUDENT IN AN ORGANIZED HEALTH CARE EDUCATION/TRAINING PROGRAM
Payer: MEDICAID

## 2022-01-20 VITALS
OXYGEN SATURATION: 98 % | RESPIRATION RATE: 18 BRPM | DIASTOLIC BLOOD PRESSURE: 83 MMHG | SYSTOLIC BLOOD PRESSURE: 128 MMHG | HEART RATE: 80 BPM | WEIGHT: 156.53 LBS | TEMPERATURE: 98 F | HEIGHT: 66 IN

## 2022-01-20 VITALS
OXYGEN SATURATION: 97 % | HEART RATE: 74 BPM | SYSTOLIC BLOOD PRESSURE: 118 MMHG | DIASTOLIC BLOOD PRESSURE: 70 MMHG | TEMPERATURE: 98 F | RESPIRATION RATE: 18 BRPM

## 2022-01-20 DIAGNOSIS — Z98.89 OTHER SPECIFIED POSTPROCEDURAL STATES: Chronic | ICD-10-CM

## 2022-01-20 LAB
ALBUMIN SERPL ELPH-MCNC: 3.8 G/DL — SIGNIFICANT CHANGE UP (ref 3.5–5)
ALP SERPL-CCNC: 68 U/L — SIGNIFICANT CHANGE UP (ref 40–120)
ALT FLD-CCNC: 39 U/L DA — SIGNIFICANT CHANGE UP (ref 10–60)
ANION GAP SERPL CALC-SCNC: 7 MMOL/L — SIGNIFICANT CHANGE UP (ref 5–17)
APPEARANCE UR: CLEAR — SIGNIFICANT CHANGE UP
AST SERPL-CCNC: 29 U/L — SIGNIFICANT CHANGE UP (ref 10–40)
BACTERIA # UR AUTO: ABNORMAL /HPF
BASOPHILS # BLD AUTO: 0 K/UL — SIGNIFICANT CHANGE UP (ref 0–0.2)
BASOPHILS NFR BLD AUTO: 0 % — SIGNIFICANT CHANGE UP (ref 0–2)
BILIRUB SERPL-MCNC: 0.4 MG/DL — SIGNIFICANT CHANGE UP (ref 0.2–1.2)
BILIRUB UR-MCNC: NEGATIVE — SIGNIFICANT CHANGE UP
BUN SERPL-MCNC: 23 MG/DL — HIGH (ref 7–18)
CALCIUM SERPL-MCNC: 9 MG/DL — SIGNIFICANT CHANGE UP (ref 8.4–10.5)
CHLORIDE SERPL-SCNC: 104 MMOL/L — SIGNIFICANT CHANGE UP (ref 96–108)
CO2 SERPL-SCNC: 27 MMOL/L — SIGNIFICANT CHANGE UP (ref 22–31)
COLOR SPEC: YELLOW — SIGNIFICANT CHANGE UP
CREAT SERPL-MCNC: 1.16 MG/DL — SIGNIFICANT CHANGE UP (ref 0.5–1.3)
DIFF PNL FLD: ABNORMAL
EOSINOPHIL # BLD AUTO: 0 K/UL — SIGNIFICANT CHANGE UP (ref 0–0.5)
EOSINOPHIL NFR BLD AUTO: 0 % — SIGNIFICANT CHANGE UP (ref 0–6)
EPI CELLS # UR: NEGATIVE /HPF — SIGNIFICANT CHANGE UP
GLUCOSE SERPL-MCNC: 107 MG/DL — HIGH (ref 70–99)
GLUCOSE UR QL: NEGATIVE — SIGNIFICANT CHANGE UP
HCT VFR BLD CALC: 35.9 % — LOW (ref 39–50)
HGB BLD-MCNC: 12.1 G/DL — LOW (ref 13–17)
KETONES UR-MCNC: NEGATIVE — SIGNIFICANT CHANGE UP
LEUKOCYTE ESTERASE UR-ACNC: NEGATIVE — SIGNIFICANT CHANGE UP
LYMPHOCYTES # BLD AUTO: 57.46 K/UL — HIGH (ref 1–3.3)
LYMPHOCYTES # BLD AUTO: 85 % — HIGH (ref 13–44)
MAGNESIUM SERPL-MCNC: 2.4 MG/DL — SIGNIFICANT CHANGE UP (ref 1.6–2.6)
MANUAL DIF COMMENT BLD-IMP: SIGNIFICANT CHANGE UP
MANUAL DIF COMMENT BLD-IMP: SIGNIFICANT CHANGE UP
MANUAL SMEAR VERIFICATION: SIGNIFICANT CHANGE UP
MCHC RBC-ENTMCNC: 29.5 PG — SIGNIFICANT CHANGE UP (ref 27–34)
MCHC RBC-ENTMCNC: 33.7 GM/DL — SIGNIFICANT CHANGE UP (ref 32–36)
MCV RBC AUTO: 87.6 FL — SIGNIFICANT CHANGE UP (ref 80–100)
MONOCYTES # BLD AUTO: 2.03 K/UL — HIGH (ref 0–0.9)
MONOCYTES NFR BLD AUTO: 3 % — SIGNIFICANT CHANGE UP (ref 2–14)
NEUTROPHILS # BLD AUTO: 8.11 K/UL — HIGH (ref 1.8–7.4)
NEUTROPHILS NFR BLD AUTO: 12 % — LOW (ref 43–77)
NITRITE UR-MCNC: NEGATIVE — SIGNIFICANT CHANGE UP
NRBC # BLD: 0 /100 — SIGNIFICANT CHANGE UP (ref 0–0)
PH UR: 6.5 — SIGNIFICANT CHANGE UP (ref 5–8)
PLAT MORPH BLD: NORMAL — SIGNIFICANT CHANGE UP
PLATELET # BLD AUTO: 119 K/UL — LOW (ref 150–400)
POTASSIUM SERPL-MCNC: 4.6 MMOL/L — SIGNIFICANT CHANGE UP (ref 3.5–5.3)
POTASSIUM SERPL-SCNC: 4.6 MMOL/L — SIGNIFICANT CHANGE UP (ref 3.5–5.3)
PROT SERPL-MCNC: 7.1 G/DL — SIGNIFICANT CHANGE UP (ref 6–8.3)
PROT UR-MCNC: NEGATIVE — SIGNIFICANT CHANGE UP
RBC # BLD: 4.1 M/UL — LOW (ref 4.2–5.8)
RBC # FLD: 14.2 % — SIGNIFICANT CHANGE UP (ref 10.3–14.5)
RBC BLD AUTO: NORMAL — SIGNIFICANT CHANGE UP
RBC CASTS # UR COMP ASSIST: ABNORMAL /HPF (ref 0–2)
SARS-COV-2 RNA SPEC QL NAA+PROBE: SIGNIFICANT CHANGE UP
SODIUM SERPL-SCNC: 138 MMOL/L — SIGNIFICANT CHANGE UP (ref 135–145)
SP GR SPEC: 1.01 — SIGNIFICANT CHANGE UP (ref 1.01–1.02)
TROPONIN I, HIGH SENSITIVITY RESULT: 4.7 NG/L — SIGNIFICANT CHANGE UP
UROBILINOGEN FLD QL: NEGATIVE — SIGNIFICANT CHANGE UP
WBC # BLD: 67.6 K/UL — CRITICAL HIGH (ref 3.8–10.5)
WBC # FLD AUTO: 67.6 K/UL — CRITICAL HIGH (ref 3.8–10.5)
WBC UR QL: SIGNIFICANT CHANGE UP /HPF (ref 0–5)

## 2022-01-20 PROCEDURE — 70450 CT HEAD/BRAIN W/O DYE: CPT | Mod: 26,MA

## 2022-01-20 PROCEDURE — 87635 SARS-COV-2 COVID-19 AMP PRB: CPT

## 2022-01-20 PROCEDURE — 85025 COMPLETE CBC W/AUTO DIFF WBC: CPT

## 2022-01-20 PROCEDURE — 70450 CT HEAD/BRAIN W/O DYE: CPT | Mod: MA

## 2022-01-20 PROCEDURE — 80053 COMPREHEN METABOLIC PANEL: CPT

## 2022-01-20 PROCEDURE — 83735 ASSAY OF MAGNESIUM: CPT

## 2022-01-20 PROCEDURE — 82962 GLUCOSE BLOOD TEST: CPT

## 2022-01-20 PROCEDURE — 99285 EMERGENCY DEPT VISIT HI MDM: CPT

## 2022-01-20 PROCEDURE — 36415 COLL VENOUS BLD VENIPUNCTURE: CPT

## 2022-01-20 PROCEDURE — 99285 EMERGENCY DEPT VISIT HI MDM: CPT | Mod: 25

## 2022-01-20 PROCEDURE — 84484 ASSAY OF TROPONIN QUANT: CPT

## 2022-01-20 PROCEDURE — 81001 URINALYSIS AUTO W/SCOPE: CPT

## 2022-01-20 PROCEDURE — 93005 ELECTROCARDIOGRAM TRACING: CPT

## 2022-01-20 RX ORDER — MECLIZINE HCL 12.5 MG
1 TABLET ORAL
Qty: 6 | Refills: 0
Start: 2022-01-20 | End: 2022-01-21

## 2022-01-20 NOTE — ED PROVIDER NOTE - QRS
What Type Of Note Output Would You Prefer (Optional)?: Standard Output How Severe Is Your Acne?: moderate 80 Is This A New Presentation, Or A Follow-Up?: Acne

## 2022-01-20 NOTE — ED PROVIDER NOTE - PROGRESS NOTE DETAILS
Labs significant for WBC 68 (but pt reports this is where his WBC is at baseline).  COVID negative.  CTH: No significant change from the prior exams.  Sellar/suprasellar nodule with calcification without significant change   in appearance. Considerations include meningioma, craniopharyngioma or   pituitary adenoma. Continued follow-up is advised.  No intracranial mass effect or hemorrhage otherwise seen.    Pt's symptoms improved, ambulating well, wants to go home.  RX for meclizine sent.  Will discharge with recs to follow up with Neurology.

## 2022-01-20 NOTE — ED PROVIDER NOTE - CLINICAL SUMMARY MEDICAL DECISION MAKING FREE TEXT BOX
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 May be BPPV but given hx of intracranial pathology will perform CTH and labs and reassess.

## 2022-01-20 NOTE — ED PROVIDER NOTE - OBJECTIVE STATEMENT
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 Room spinning sensation, intermittent, unsteadiness when walking. +nausea, no vomiting. No abdominal pain. No neuro deficits, including weakness, numbness, tingling, or any other concerning symptoms.

## 2022-01-20 NOTE — ED PROVIDER NOTE - PATIENT PORTAL LINK FT
You can access the FollowMyHealth Patient Portal offered by [facility] by registering at the following website: [email or web address]. By joining [facility]’s FollowMyHealth portal, you will also be able to view your health information using other applications (apps) compatible with our system.

## 2022-01-21 ENCOUNTER — NON-APPOINTMENT (OUTPATIENT)
Age: 62
End: 2022-01-21

## 2022-02-07 PROBLEM — C91.10 CHRONIC LYMPHOCYTIC LEUKEMIA OF B-CELL TYPE NOT HAVING ACHIEVED REMISSION: Chronic | Status: ACTIVE | Noted: 2022-01-20

## 2022-02-17 ENCOUNTER — APPOINTMENT (OUTPATIENT)
Dept: NEUROLOGY | Facility: CLINIC | Age: 62
End: 2022-02-17
Payer: MEDICAID

## 2022-02-17 VITALS — DIASTOLIC BLOOD PRESSURE: 80 MMHG | HEART RATE: 84 BPM | SYSTOLIC BLOOD PRESSURE: 125 MMHG

## 2022-02-17 VITALS
SYSTOLIC BLOOD PRESSURE: 130 MMHG | WEIGHT: 150 LBS | OXYGEN SATURATION: 97 % | HEIGHT: 63 IN | HEART RATE: 86 BPM | TEMPERATURE: 97.3 F | BODY MASS INDEX: 26.58 KG/M2 | DIASTOLIC BLOOD PRESSURE: 72 MMHG

## 2022-02-17 VITALS — SYSTOLIC BLOOD PRESSURE: 119 MMHG | DIASTOLIC BLOOD PRESSURE: 83 MMHG

## 2022-02-17 DIAGNOSIS — H81.4 VERTIGO OF CENTRAL ORIGIN: ICD-10-CM

## 2022-02-17 PROCEDURE — 99215 OFFICE O/P EST HI 40 MIN: CPT

## 2022-02-17 RX ORDER — PROMETHAZINE HYDROCHLORIDE 25 MG/1
25 SUPPOSITORY RECTAL
Qty: 12 | Refills: 1 | Status: ACTIVE | COMMUNITY
Start: 2022-02-17 | End: 1900-01-01

## 2022-02-17 NOTE — PHYSICAL EXAM
[Person] : oriented to person [Place] : oriented to place [Time] : oriented to time [Concentration Intact] : normal concentrating ability [Visual Intact] : visual attention was ~T not ~L decreased [Naming Objects] : no difficulty naming common objects [Repeating Phrases] : no difficulty repeating a phrase [Writing A Sentence] : no difficulty writing a sentence [Fluency] : fluency intact [Comprehension] : comprehension intact [Reading] : reading intact [Past History] : adequate knowledge of personal past history [Cranial Nerves Optic (II)] : visual acuity intact bilaterally,  visual fields full to confrontation, pupils equal round and reactive to light [Cranial Nerves Oculomotor (III)] : extraocular motion intact [Cranial Nerves Trigeminal (V)] : facial sensation intact symmetrically [Cranial Nerves Facial (VII)] : face symmetrical [Cranial Nerves Vestibulocochlear (VIII)] : hearing was intact bilaterally [Cranial Nerves Glossopharyngeal (IX)] : tongue and palate midline [Cranial Nerves Accessory (XI - Cranial And Spinal)] : head turning and shoulder shrug symmetric [Cranial Nerves Hypoglossal (XII)] : there was no tongue deviation with protrusion [Motor Tone] : muscle tone was normal in all four extremities [Motor Strength] : muscle strength was normal in all four extremities [No Muscle Atrophy] : normal bulk in all four extremities [Sensation Tactile Decrease] : light touch was intact [Abnormal Walk] : normal gait [Balance] : balance was intact [2+] : Ankle jerk left 2+ [Auscultation Breath Sounds / Voice Sounds] : lungs were clear to auscultation bilaterally [Heart Rate And Rhythm] : heart rate was normal and rhythm regular [Heart Sounds] : normal S1 and S2 [Heart Sounds Gallop] : no gallops [Murmurs] : no murmurs [Heart Sounds Pericardial Friction Rub] : no pericardial rub [Full Pulse] : the pedal pulses are present [Edema] : there was no peripheral edema [Bowel Sounds] : normal bowel sounds [Abdomen Soft] : soft [Abdomen Tenderness] : non-tender [] : no hepato-splenomegaly [Abdomen Mass (___ Cm)] : no abdominal mass palpated [Past-pointing] : there was no past-pointing [Tremor] : no tremor present [Plantar Reflex Right Only] : normal on the right [Plantar Reflex Left Only] : normal on the left [FreeTextEntry5] : Toño-Hallpike maneuver does not cause dizziness in having hanging left or head hanging right position but does not use transient vertigo when turning his head from left to right or when sitting up from supine position.

## 2022-02-17 NOTE — HISTORY OF PRESENT ILLNESS
[FreeTextEntry1] : Mr. Bronwyn mireles was seen in the emergency room because of severe vertigo.  It lasted the whole day with nausea without vomiting.  He complains of ringing in the left ear.  He does not know if he has hearing loss.  This started suddenly 2 weeks ago.  He was discharged home on meclizine.  He has had a few episodes of dizziness since returning home along with the tinnitus but none of the episodes have lasted as long as the episode that took him to the emergency room.  He denies dizziness on standing up but he admits that turning in bed or turning his head can bring him the symptom of vertigo.\par \par He has been compliant with the hydrocortisone medication and all other medications for acquired hypopituitarism.  He denies headache visual loss.

## 2022-02-17 NOTE — DISCUSSION/SUMMARY
[FreeTextEntry1] : He has symptoms of his central vestibulopathy.  I reviewed his MRI scan from 2020.  I have requested the MRI scan done more recently.  The central vestibulopathy may be due to a vestibular nuclear disorder or ischemic disease but is not connected to pituitary disease.  With 2020 MRI scan does not show cerebrovascular ischemia.  He denied a headache at any time.\par I recommended vestibular therapy and promethazine prn.

## 2022-05-02 NOTE — ED ADULT NURSE NOTE - CAS TRG GEN SKIN CONDITION
Dry/Warm Xolair Counseling:  Patient informed of potential adverse effects including but not limited to fever, muscle aches, rash and allergic reactions.  The patient verbalized understanding of the proper use and possible adverse effects of Xolair.  All of the patient's questions and concerns were addressed.

## 2022-06-08 ENCOUNTER — APPOINTMENT (OUTPATIENT)
Dept: ENDOCRINOLOGY | Facility: CLINIC | Age: 62
End: 2022-06-08
Payer: MEDICAID

## 2022-06-08 VITALS
HEIGHT: 65.16 IN | HEART RATE: 83 BPM | TEMPERATURE: 97.3 F | BODY MASS INDEX: 23.49 KG/M2 | SYSTOLIC BLOOD PRESSURE: 114 MMHG | OXYGEN SATURATION: 98 % | WEIGHT: 142.72 LBS | DIASTOLIC BLOOD PRESSURE: 69 MMHG

## 2022-06-08 PROCEDURE — 99213 OFFICE O/P EST LOW 20 MIN: CPT | Mod: 25

## 2022-06-08 RX ORDER — LEVOTHYROXINE SODIUM 0.09 MG/1
88 TABLET ORAL
Qty: 90 | Refills: 2 | Status: COMPLETED | COMMUNITY
Start: 2020-12-21 | End: 2022-06-08

## 2022-06-13 NOTE — PHYSICAL EXAM
[Alert] : alert [Well Nourished] : well nourished [Healthy Appearance] : healthy appearance [No Acute Distress] : no acute distress [Well Developed] : well developed [Normal Voice/Communication] : normal voice communication [Normal Sclera/Conjunctiva] : normal sclera/conjunctiva [No Proptosis] : no proptosis [No Neck Mass] : no neck mass was observed [No LAD] : no lymphadenopathy [Thyroid Not Enlarged] : the thyroid was not enlarged [No Thyroid Nodules] : no palpable thyroid nodules [No Respiratory Distress] : no respiratory distress [No Accessory Muscle Use] : no accessory muscle use [Normal Rate and Effort] : normal respiratory rate and effort [Normal Rate] : heart rate was normal [Spine Straight] : spine straight [No Stigmata of Cushings Syndrome] : no stigmata of Cushings Syndrome [No Clubbing, Cyanosis] : no clubbing  or cyanosis of the fingernails [Normal Gait] : normal gait [No Involuntary Movements] : no involuntary movements were seen [No Tremors] : no tremors [Normal Affect] : the affect was normal [Normal Insight/Judgement] : insight and judgment were intact [Normal Mood] : the mood was normal

## 2022-06-13 NOTE — ASSESSMENT
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 (TSH is not accurate in central hypothyroidism). \par Continue levothyroxine 88 mcg daily pending results.\par Continue hydrocortisone 10 mg in the morning and 5 mg at 4 PM. Sick day rules were reviewed. He was advised to purchase a medic alert bracelet or use Medical ID demar.\par Check anterior pituitary hormones.  Check prolactin.  Check testosterone.\par Referred to urology.\par Follow up with neurosurgery regarding pituitary mri\par

## 2022-06-13 NOTE — HISTORY OF PRESENT ILLNESS
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 He was found to have hypotension and a low cortisol level of 1.6. ACTH stimulation test showed suboptimal cortisol response (10.1.). He was started on hydrocortisone. He is currently taking hydrocortisone 10 mg in the morning and 5 mg at 4 PM. He is on LT4 88mcg qd. \par He denies blurry vision and headaches. \par

## 2022-06-17 ENCOUNTER — NON-APPOINTMENT (OUTPATIENT)
Age: 62
End: 2022-06-17

## 2022-06-17 LAB
25(OH)D3 SERPL-MCNC: 28.9 NG/ML
ACTH-ESO: 12 PG/ML
ALBUMIN SERPL ELPH-MCNC: 4.8 G/DL
ALP BLD-CCNC: 84 U/L
ALT SERPL-CCNC: 17 U/L
ANION GAP SERPL CALC-SCNC: 11 MMOL/L
AST SERPL-CCNC: 24 U/L
BILIRUB SERPL-MCNC: 0.3 MG/DL
BUN SERPL-MCNC: 26 MG/DL
CALCIUM SERPL-MCNC: 9.7 MG/DL
CHLORIDE SERPL-SCNC: 104 MMOL/L
CHOLEST SERPL-MCNC: 208 MG/DL
CO2 SERPL-SCNC: 27 MMOL/L
CREAT SERPL-MCNC: 1.39 MG/DL
EGFR: 58 ML/MIN/1.73M2
ESTIMATED AVERAGE GLUCOSE: 120 MG/DL
FSH SERPL-MCNC: 0.8 IU/L
GLUCOSE SERPL-MCNC: 84 MG/DL
HBA1C MFR BLD HPLC: 5.8 %
HDLC SERPL-MCNC: 51 MG/DL
IGF-1 INTERP: NORMAL
IGF-I BLD-MCNC: 66 NG/ML
LDLC SERPL CALC-MCNC: 129 MG/DL
LH SERPL-ACNC: <0.3 IU/L
NONHDLC SERPL-MCNC: 157 MG/DL
POTASSIUM SERPL-SCNC: 4.1 MMOL/L
PROLACTIN SERPL-MCNC: 22.8 NG/ML
PROT SERPL-MCNC: 7.2 G/DL
SODIUM SERPL-SCNC: 142 MMOL/L
T3 SERPL-MCNC: 83 NG/DL
T4 FREE SERPL-MCNC: 1.5 NG/DL
TESTOST FREE SERPL-MCNC: 0 PG/ML
TESTOST SERPL-MCNC: <2.5 NG/DL
TRIGL SERPL-MCNC: 140 MG/DL
TSH SERPL-ACNC: <0.01 UIU/ML

## 2022-07-12 ENCOUNTER — APPOINTMENT (OUTPATIENT)
Dept: OPHTHALMOLOGY | Facility: CLINIC | Age: 62
End: 2022-07-12

## 2022-07-12 ENCOUNTER — NON-APPOINTMENT (OUTPATIENT)
Age: 62
End: 2022-07-12

## 2022-07-12 PROCEDURE — 92083 EXTENDED VISUAL FIELD XM: CPT

## 2022-07-12 PROCEDURE — 92004 COMPRE OPH EXAM NEW PT 1/>: CPT

## 2022-07-19 ENCOUNTER — APPOINTMENT (OUTPATIENT)
Dept: UROLOGY | Facility: CLINIC | Age: 62
End: 2022-07-19

## 2022-08-22 ENCOUNTER — RX RENEWAL (OUTPATIENT)
Age: 62
End: 2022-08-22

## 2022-11-01 ENCOUNTER — RX RENEWAL (OUTPATIENT)
Age: 62
End: 2022-11-01

## 2022-12-22 ENCOUNTER — APPOINTMENT (OUTPATIENT)
Dept: ENDOCRINOLOGY | Facility: CLINIC | Age: 62
End: 2022-12-22

## 2022-12-22 ENCOUNTER — LABORATORY RESULT (OUTPATIENT)
Age: 62
End: 2022-12-22

## 2022-12-22 VITALS
WEIGHT: 315 LBS | OXYGEN SATURATION: 97 % | HEIGHT: 63.98 IN | DIASTOLIC BLOOD PRESSURE: 66 MMHG | BODY MASS INDEX: 53.78 KG/M2 | HEART RATE: 78 BPM | TEMPERATURE: 97.2 F | SYSTOLIC BLOOD PRESSURE: 100 MMHG

## 2022-12-22 DIAGNOSIS — E29.1 TESTICULAR HYPOFUNCTION: ICD-10-CM

## 2022-12-22 PROCEDURE — 99214 OFFICE O/P EST MOD 30 MIN: CPT | Mod: 25

## 2022-12-22 NOTE — ADDENDUM
[FreeTextEntry1] : By signing my name below, I, Reji Adorno, attest that this documentation has been prepared under the direction and in the presence of Dr. Bob.\par \par I, Aliya Bob MD, personally performed the services described in this documentation. All medical record entries made by the scribe were at my direction and in my presence. I have reviewed the chart and discharge instructions (if applicable) and agree that the record reflects my personal performance and is accurate and complete.

## 2022-12-22 NOTE — ASSESSMENT
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 (TSH is not accurate in central hypothyroidism). \par Continue levothyroxine 88 mcg daily pending results.\par Continue hydrocortisone 10 mg in the morning and 5 mg at 4 PM. Sick day rules were reviewed.\par Check anterior pituitary hormones. Check prolactin. Check testosterone.\par He was seen by Juanjose Serna with [facility] and was advised to start testosterone replacement therapy but the injection is not covered by his insurance. He later declined treatment due to side affect concerns.\par Check MRI of the pituitary gland.\par He is again advised to follow-up with his neurologist and was provided with the contact information.

## 2022-12-22 NOTE — PHYSICAL EXAM
[Alert] : alert [Well Nourished] : well nourished [Healthy Appearance] : healthy appearance [No Acute Distress] : no acute distress [Normal Voice/Communication] : normal voice communication [Normal Sclera/Conjunctiva] : normal sclera/conjunctiva [No Proptosis] : no proptosis [No Neck Mass] : no neck mass was observed [No LAD] : no lymphadenopathy [Supple] : the neck was supple [Thyroid Not Enlarged] : the thyroid was not enlarged [No Thyroid Nodules] : no palpable thyroid nodules [No Respiratory Distress] : no respiratory distress [No Stigmata of Cushings Syndrome] : no stigmata of Cushings Syndrome [No Involuntary Movements] : no involuntary movements were seen [Normal Affect] : the affect was normal [Normal Insight/Judgement] : insight and judgment were intact [Normal Mood] : the mood was normal

## 2022-12-22 NOTE — HISTORY OF PRESENT ILLNESS
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 He was found to have hypotension and a low cortisol level of 1.6. ACTH stimulation test showed suboptimal cortisol response (10.1.). He was started on hydrocortisone. He is currently taking hydrocortisone 10 mg in the morning and 5 mg at 4 PM. He is on LT4 88mcg qd. \par He denies blurry vision and headaches.\par He was seen by Dr. Juanjose Serna with urology. He was prescribed medication testosterone injections but his insurance did not cover it. He offered additional formulations but  he declined because he was concerned about possible side effects.\par He stopped smoking 2 months ago.

## 2022-12-27 ENCOUNTER — LABORATORY RESULT (OUTPATIENT)
Age: 62
End: 2022-12-27

## 2022-12-29 LAB
25(OH)D3 SERPL-MCNC: 29.7 NG/ML
ALBUMIN SERPL ELPH-MCNC: 4.5 G/DL
ALP BLD-CCNC: 85 U/L
ALT SERPL-CCNC: 64 U/L
ANION GAP SERPL CALC-SCNC: 13 MMOL/L
AST SERPL-CCNC: 45 U/L
BILIRUB SERPL-MCNC: 0.3 MG/DL
BUN SERPL-MCNC: 31 MG/DL
CALCIUM SERPL-MCNC: 9.7 MG/DL
CHLORIDE SERPL-SCNC: 104 MMOL/L
CO2 SERPL-SCNC: 24 MMOL/L
CREAT SERPL-MCNC: 1.45 MG/DL
EGFR: 55 ML/MIN/1.73M2
ESTIMATED AVERAGE GLUCOSE: 117 MG/DL
FSH SERPL-MCNC: 0.8 IU/L
GLUCOSE SERPL-MCNC: 92 MG/DL
HBA1C MFR BLD HPLC: 5.7 %
IGF-1 INTERP: NORMAL
IGF-I BLD-MCNC: 55 NG/ML
LH SERPL-ACNC: <0.3 IU/L
POTASSIUM SERPL-SCNC: 4.1 MMOL/L
PROLACTIN SERPL-MCNC: 22.4 NG/ML
PROT SERPL-MCNC: 6.7 G/DL
SODIUM SERPL-SCNC: 140 MMOL/L
T3 SERPL-MCNC: 98 NG/DL
T4 FREE SERPL-MCNC: 1.3 NG/DL

## 2022-12-30 ENCOUNTER — NON-APPOINTMENT (OUTPATIENT)
Age: 62
End: 2022-12-30

## 2023-01-15 ENCOUNTER — APPOINTMENT (OUTPATIENT)
Dept: MRI IMAGING | Facility: IMAGING CENTER | Age: 63
End: 2023-01-15

## 2023-01-29 ENCOUNTER — APPOINTMENT (OUTPATIENT)
Dept: MRI IMAGING | Facility: CLINIC | Age: 63
End: 2023-01-29

## 2023-01-31 ENCOUNTER — NON-APPOINTMENT (OUTPATIENT)
Age: 63
End: 2023-01-31

## 2023-06-13 ENCOUNTER — APPOINTMENT (OUTPATIENT)
Dept: NEUROLOGY | Facility: CLINIC | Age: 63
End: 2023-06-13
Payer: MEDICAID

## 2023-06-13 VITALS
BODY MASS INDEX: 24.59 KG/M2 | SYSTOLIC BLOOD PRESSURE: 117 MMHG | DIASTOLIC BLOOD PRESSURE: 71 MMHG | OXYGEN SATURATION: 97 % | TEMPERATURE: 97.3 F | HEIGHT: 63.98 IN | HEART RATE: 71 BPM | WEIGHT: 144 LBS

## 2023-06-13 PROCEDURE — 99214 OFFICE O/P EST MOD 30 MIN: CPT

## 2023-06-13 RX ORDER — ASCORBIC ACID 500 MG
100 TABLET ORAL DAILY
Qty: 120 | Refills: 3 | Status: ACTIVE | COMMUNITY
Start: 2023-06-13 | End: 1900-01-01

## 2023-06-13 RX ORDER — VITS A,C,E/LUTEIN/MINERALS 300MCG-200
200 TABLET ORAL TWICE DAILY
Qty: 60 | Refills: 3 | Status: ACTIVE | COMMUNITY
Start: 2023-06-13 | End: 1900-01-01

## 2023-06-13 RX ORDER — NORTRIPTYLINE HYDROCHLORIDE 10 MG/1
10 CAPSULE ORAL
Qty: 180 | Refills: 3 | Status: ACTIVE | COMMUNITY
Start: 2023-06-13 | End: 1900-01-01

## 2023-06-13 NOTE — HISTORY OF PRESENT ILLNESS
[FreeTextEntry1] : Occasionally light headed, on other days feels vertiggo, still other days a mild headache. Admits one symptom or more every day with a few symptoms-free days. Diagnosed CLL

## 2023-06-13 NOTE — PHYSICAL EXAM
[Person] : oriented to person [Place] : oriented to place [Time] : oriented to time [Concentration Intact] : normal concentrating ability [Visual Intact] : visual attention was ~T not ~L decreased [Naming Objects] : no difficulty naming common objects [Repeating Phrases] : no difficulty repeating a phrase [Writing A Sentence] : no difficulty writing a sentence [Fluency] : fluency intact [Comprehension] : comprehension intact [Reading] : reading intact [Past History] : adequate knowledge of personal past history [Cranial Nerves Optic (II)] : visual acuity intact bilaterally,  visual fields full to confrontation, pupils equal round and reactive to light [Cranial Nerves Oculomotor (III)] : extraocular motion intact [Cranial Nerves Trigeminal (V)] : facial sensation intact symmetrically [Cranial Nerves Facial (VII)] : face symmetrical [Cranial Nerves Vestibulocochlear (VIII)] : hearing was intact bilaterally [Cranial Nerves Glossopharyngeal (IX)] : tongue and palate midline [Cranial Nerves Accessory (XI - Cranial And Spinal)] : head turning and shoulder shrug symmetric [Cranial Nerves Hypoglossal (XII)] : there was no tongue deviation with protrusion [Motor Tone] : muscle tone was normal in all four extremities [Motor Strength] : muscle strength was normal in all four extremities [No Muscle Atrophy] : normal bulk in all four extremities [Sensation Tactile Decrease] : light touch was intact [Abnormal Walk] : normal gait [Balance] : balance was intact [Past-pointing] : there was no past-pointing [Tremor] : no tremor present [2+] : Ankle jerk left 2+ [Plantar Reflex Right Only] : normal on the right [Plantar Reflex Left Only] : normal on the left [FreeTextEntry5] : Toño-Hallpike maneuver does not cause dizziness in having hanging left or head hanging right position but does not use transient vertigo when turning his head from left to right or when sitting up from supine position. [Auscultation Breath Sounds / Voice Sounds] : lungs were clear to auscultation bilaterally [Heart Rate And Rhythm] : heart rate was normal and rhythm regular [Heart Sounds] : normal S1 and S2 [Heart Sounds Gallop] : no gallops [Murmurs] : no murmurs [Heart Sounds Pericardial Friction Rub] : no pericardial rub [Full Pulse] : the pedal pulses are present [Edema] : there was no peripheral edema [Bowel Sounds] : normal bowel sounds [Abdomen Soft] : soft [Abdomen Tenderness] : non-tender [] : no hepato-splenomegaly [Abdomen Mass (___ Cm)] : no abdominal mass palpated

## 2023-06-13 NOTE — DISCUSSION/SUMMARY
[FreeTextEntry1] : Trial of nortriptyline for vestibular migraine every night with riboflavin and magnesium

## 2023-06-22 ENCOUNTER — APPOINTMENT (OUTPATIENT)
Dept: ENDOCRINOLOGY | Facility: CLINIC | Age: 63
End: 2023-06-22
Payer: MEDICAID

## 2023-06-22 VITALS
OXYGEN SATURATION: 98 % | DIASTOLIC BLOOD PRESSURE: 83 MMHG | WEIGHT: 146.44 LBS | HEART RATE: 98 BPM | BODY MASS INDEX: 25 KG/M2 | HEIGHT: 63.98 IN | TEMPERATURE: 97.9 F | SYSTOLIC BLOOD PRESSURE: 124 MMHG

## 2023-06-22 DIAGNOSIS — R73.03 PREDIABETES.: ICD-10-CM

## 2023-06-22 DIAGNOSIS — E55.9 VITAMIN D DEFICIENCY, UNSPECIFIED: ICD-10-CM

## 2023-06-22 PROCEDURE — 99214 OFFICE O/P EST MOD 30 MIN: CPT | Mod: 25

## 2023-06-22 PROCEDURE — 36415 COLL VENOUS BLD VENIPUNCTURE: CPT

## 2023-06-27 PROBLEM — R73.03 PRE-DIABETES: Status: ACTIVE | Noted: 2021-09-16

## 2023-06-27 PROBLEM — E55.9 VITAMIN D INSUFFICIENCY: Status: ACTIVE | Noted: 2021-09-16

## 2023-06-27 LAB
25(OH)D3 SERPL-MCNC: 30.6 NG/ML
ESTIMATED AVERAGE GLUCOSE: 114 MG/DL
FSH SERPL-MCNC: 0.7 IU/L
HBA1C MFR BLD HPLC: 5.6 %
IGF-1 INTERP: NORMAL
IGF-I BLD-MCNC: 26 NG/ML
LH SERPL-ACNC: <0.3 IU/L
PROLACTIN SERPL-MCNC: 16.9 NG/ML
T4 FREE SERPL-MCNC: 1.7 NG/DL
TESTOST FREE SERPL-MCNC: 0 PG/ML
TESTOST SERPL-MCNC: <2.5 NG/DL

## 2023-06-27 NOTE — HISTORY OF PRESENT ILLNESS
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 He was found to have hypotension and a low cortisol level of 1.6. ACTH stimulation test showed suboptimal cortisol response (10.1.). He was started on hydrocortisone. He is currently taking hydrocortisone 10 mg in the morning and 5 mg at 4 PM. He is on LT4 88mcg qd. \par He denies blurry vision and headaches.\par He was seen by Dr. Juanjose Senra with urology. He was prescribed medication testosterone injections but his insurance did not cover it. He offered additional formulations but  he declined because he was concerned about possible side effects.\par He is currently going workup for elevated liver enzymes.\par He was diagnosed with vestibular migraines.

## 2023-06-27 NOTE — ASSESSMENT
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 (TSH is not accurate in central hypothyroidism). \par Continue levothyroxine 88 mcg daily pending results.\par Continue hydrocortisone 10 mg in the morning and 5 mg at 4 PM. Sick day rules were reviewed.\par Check anterior pituitary hormones. Check prolactin. Check testosterone.\par He was seen by Juanjose Serna with [facility] and was advised to start testosterone replacement therapy but the injection is not covered by his insurance. He later declined treatment due to side effect concerns.\par Per patient he had MRI with neurologist. Resuts have been requested.\par

## 2023-06-27 NOTE — ADDENDUM
[FreeTextEntry1] : By signing my name below, I, Srikanth Syed, attest that this document has been prepared under the direction and in the presence of Dr. Bob.\par \par I, Aliya Bob MD, personally performed the services described in this documentation. All medical record entries made by the scribe were at my discretion and in my presence. I have reviewed the chart and discharge instructions (if applicable) and agree that the record reflects my personal permanence and is accurate and complete.\par

## 2023-06-29 ENCOUNTER — NON-APPOINTMENT (OUTPATIENT)
Age: 63
End: 2023-06-29

## 2023-10-17 ENCOUNTER — APPOINTMENT (OUTPATIENT)
Dept: NEUROLOGY | Facility: CLINIC | Age: 63
End: 2023-10-17

## 2023-12-20 ENCOUNTER — APPOINTMENT (OUTPATIENT)
Dept: NEUROLOGY | Facility: CLINIC | Age: 63
End: 2023-12-20
Payer: MEDICAID

## 2023-12-20 VITALS
OXYGEN SATURATION: 99 % | DIASTOLIC BLOOD PRESSURE: 79 MMHG | HEART RATE: 84 BPM | WEIGHT: 146 LBS | BODY MASS INDEX: 24.92 KG/M2 | SYSTOLIC BLOOD PRESSURE: 119 MMHG | HEIGHT: 63.98 IN | TEMPERATURE: 97.4 F

## 2023-12-20 PROCEDURE — 99215 OFFICE O/P EST HI 40 MIN: CPT

## 2023-12-20 NOTE — ASSESSMENT
[FreeTextEntry1] : Primary headache likely vascular headache.  Secondary headache syndrome has not been completely excluded.  Pituitary adenoma which is not the cause of the headache.  Follow-up MRI scan.  Resume nortriptyline if headaches recur.  Follow-up after the MRI scan.

## 2023-12-20 NOTE — HISTORY OF PRESENT ILLNESS
[FreeTextEntry1] : Diagnosed with Chronic Lymphocytic Leukemia and began treatment of Calquence. Side effect of this medication is headache, but he started drinking coffee in the morning as instructed by the oncologist to prevent headaches. He will be following up with the physician in two weeks to discuss the effects of the new medication he has begun. Has not suffered headaches or dizziness since he began a trial of nortriptyline and has brought his headache diary with him.  Because he had not suffered headaches for a long.  He quit using nortriptyline a few weeks before he was diagnosed with chronic lymphocytic leukemia.  Currently the headaches are controlled by using 1 cup of caffeine containing drink in the morning. He is also followed up for review of his pituitary adenoma whose last MRI scan was 2 years ago.  He reports no visual symptoms, change in body habitus, loss of secondary sexual characteristics, increased tiredness fatigue.

## 2023-12-20 NOTE — DISCUSSION/SUMMARY
[FreeTextEntry1] : He has an appointment for an MRI scan of his chest and abdomen in January and February and requested the MRI of pituitary for follow-up of pituitary adenoma be postponed till March.  Because he does not have acute symptoms localized to the pituitary adenoma it is safe to postpone his follow-up MRI scan till March.  Purpose of the MRI scan is also to investigate possible autoimmune or other forms of meningitis causing headaches.  Hence a scan with and without contrast is requested.  The scan will be hastened if symptoms of headache and encephalopathy were to develop in the interim.

## 2024-01-31 NOTE — PHYSICAL THERAPY INITIAL EVALUATION ADULT - PATIENT PROFILE REVIEW, REHAB EVAL
PA for  rivaroxaban (XARELTO) 20 mg tablet Approved   Date(s) approved 1/31/24-1/30/25  Case #[id number]    Patient advised by [] [place] Message                      [x] Phone call       Pharmacy advised by [x]Fax                                     []Phone call    Approval letter scanned into Media No -Did not receive yet       yes/EMR, labs, radiology and imaging reports reviewed

## 2024-03-07 ENCOUNTER — NON-APPOINTMENT (OUTPATIENT)
Age: 64
End: 2024-03-07

## 2024-05-23 ENCOUNTER — APPOINTMENT (OUTPATIENT)
Dept: ENDOCRINOLOGY | Facility: CLINIC | Age: 64
End: 2024-05-23
Payer: MEDICAID

## 2024-05-23 VITALS
WEIGHT: 146 LBS | OXYGEN SATURATION: 99 % | HEIGHT: 63.98 IN | HEART RATE: 81 BPM | BODY MASS INDEX: 24.92 KG/M2 | TEMPERATURE: 97.2 F | SYSTOLIC BLOOD PRESSURE: 109 MMHG | DIASTOLIC BLOOD PRESSURE: 79 MMHG

## 2024-05-23 DIAGNOSIS — D35.2 BENIGN NEOPLASM OF PITUITARY GLAND: ICD-10-CM

## 2024-05-23 DIAGNOSIS — E23.0 HYPOPITUITARISM: ICD-10-CM

## 2024-05-23 DIAGNOSIS — E22.1 HYPERPROLACTINEMIA: ICD-10-CM

## 2024-05-23 DIAGNOSIS — E03.8 OTHER SPECIFIED HYPOTHYROIDISM: ICD-10-CM

## 2024-05-23 DIAGNOSIS — E27.49 OTHER ADRENOCORTICAL INSUFFICIENCY: ICD-10-CM

## 2024-05-23 PROCEDURE — 36415 COLL VENOUS BLD VENIPUNCTURE: CPT

## 2024-05-23 PROCEDURE — 99214 OFFICE O/P EST MOD 30 MIN: CPT

## 2024-05-23 RX ORDER — HYDROCORTISONE 5 MG/1
5 TABLET ORAL
Qty: 270 | Refills: 3 | Status: ACTIVE | COMMUNITY
Start: 2020-04-20 | End: 2025-05-18

## 2024-05-23 RX ORDER — LEVOTHYROXINE SODIUM 0.09 MG/1
88 TABLET ORAL
Qty: 90 | Refills: 2 | Status: ACTIVE | COMMUNITY
Start: 2021-11-30 | End: 1900-01-01

## 2024-05-23 NOTE — PHYSICAL EXAM
[Alert] : alert [Well Nourished] : well nourished [Healthy Appearance] : healthy appearance [No Acute Distress] : no acute distress [Normal Voice/Communication] : normal voice communication [Normal Sclera/Conjunctiva] : normal sclera/conjunctiva [No Proptosis] : no proptosis [No Neck Mass] : no neck mass was observed [No LAD] : no lymphadenopathy [Supple] : the neck was supple [Thyroid Not Enlarged] : the thyroid was not enlarged [No Thyroid Nodules] : no palpable thyroid nodules [No Respiratory Distress] : no respiratory distress [Normal Affect] : the affect was normal [Normal Insight/Judgement] : insight and judgment were intact [Normal Mood] : the mood was normal

## 2024-05-23 NOTE — HISTORY OF PRESENT ILLNESS
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 He was hospitalized at [facility] in March 2020 after he had a syncopal episode. He was found to have hypotension and a low cortisol level of 1.6. ACTH stimulation test showed suboptimal cortisol response (10.1.). He was started on hydrocortisone. He is currently taking hydrocortisone 10 mg in the morning and 5 mg at 4 PM. He is on LT4 88mcg qd.  He denies blurry vision and headaches. He was seen by Dr. Juanjose Serna with urology. He was prescribed medication testosterone injections, but his insurance did not cover it. He offered additional formulations, but he declined because he was concerned about possible side effects. MRI brain from March 2024 showed heterogenous pituitary mass extending into the suprasellar cistern similar to previous.  He is on Calquence for CLL.

## 2024-05-23 NOTE — ASSESSMENT
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 Check free T4. (TSH is not accurate in central hypothyroidism).  Continue levothyroxine 88 mcg daily pending results. Continue hydrocortisone 10 mg in the morning and 5 mg at 4 PM. Sick day rules were reviewed. Check anterior pituitary hormones. Check prolactin. Check testosterone. He was seen by Juanjose Serna with [facility] and was advised to start testosterone replacement therapy, but the injection is not covered by his insurance. He later declined treatment due to side effect concerns. MRI brain from March 2024 showed heterogenous pituitary mass extending into the suprasellar cistern similar to previous.  Denies headaches or blurry vision.

## 2024-05-23 NOTE — ADDENDUM
[FreeTextEntry1] :  By signing my name below, I, Sarah Das, attest that this document has been prepared under the direction and in the presence of Dr. Bob.  I, Aliya Bob MD, personally performed the services described in this documentation. All medical record entries made by the scribe were at my discretion and in my presence. I have reviewed the chart and discharge instructions (if applicable) and agree that the record reflects my personal permanence and is accurate and complete.

## 2024-05-28 LAB
ACTH SER-ACNC: 7.5 PG/ML
ALBUMIN SERPL ELPH-MCNC: 4.4 G/DL
ALP BLD-CCNC: 69 U/L
ALT SERPL-CCNC: 18 U/L
ANION GAP SERPL CALC-SCNC: 13 MMOL/L
AST SERPL-CCNC: 23 U/L
BILIRUB SERPL-MCNC: 0.3 MG/DL
BUN SERPL-MCNC: 27 MG/DL
CALCIUM SERPL-MCNC: 9.6 MG/DL
CHLORIDE SERPL-SCNC: 103 MMOL/L
CO2 SERPL-SCNC: 25 MMOL/L
CORTIS SERPL-MCNC: 1.4 UG/DL
CREAT SERPL-MCNC: 1.47 MG/DL
EGFR: 53 ML/MIN/1.73M2
FSH SERPL-MCNC: 0.9 IU/L
GLUCOSE SERPL-MCNC: 94 MG/DL
IGF-1 INTERP: NORMAL
IGF-I BLD-MCNC: 25 NG/ML
LH SERPL-ACNC: <0.3 IU/L
POTASSIUM SERPL-SCNC: 4.4 MMOL/L
PROLACTIN SERPL-MCNC: 22.2 NG/ML
PROT SERPL-MCNC: 6.7 G/DL
SODIUM SERPL-SCNC: 140 MMOL/L
T3 SERPL-MCNC: 104 NG/DL
T4 FREE SERPL-MCNC: 1.4 NG/DL

## 2024-06-04 LAB
TESTOST FREE SERPL-MCNC: 0 PG/ML
TESTOST SERPL-MCNC: <2.5 NG/DL

## 2024-11-13 ENCOUNTER — INPATIENT (INPATIENT)
Facility: HOSPITAL | Age: 64
LOS: 4 days | Discharge: HOME | DRG: 312 | End: 2024-11-18
Attending: INTERNAL MEDICINE | Admitting: INTERNAL MEDICINE
Payer: MEDICAID

## 2024-11-13 VITALS
SYSTOLIC BLOOD PRESSURE: 146 MMHG | HEIGHT: 66 IN | DIASTOLIC BLOOD PRESSURE: 83 MMHG | WEIGHT: 154.98 LBS | RESPIRATION RATE: 18 BRPM | OXYGEN SATURATION: 100 % | HEART RATE: 68 BPM | TEMPERATURE: 98 F

## 2024-11-13 DIAGNOSIS — R55 SYNCOPE AND COLLAPSE: ICD-10-CM

## 2024-11-13 DIAGNOSIS — Z98.89 OTHER SPECIFIED POSTPROCEDURAL STATES: Chronic | ICD-10-CM

## 2024-11-13 LAB
ACETONE SERPL-MCNC: NEGATIVE — SIGNIFICANT CHANGE UP
ALBUMIN SERPL ELPH-MCNC: 3.8 G/DL — SIGNIFICANT CHANGE UP (ref 3.5–5)
ALP SERPL-CCNC: 73 U/L — SIGNIFICANT CHANGE UP (ref 40–120)
ALT FLD-CCNC: 21 U/L DA — SIGNIFICANT CHANGE UP (ref 10–60)
ANION GAP SERPL CALC-SCNC: 6 MMOL/L — SIGNIFICANT CHANGE UP (ref 5–17)
APTT BLD: 32.7 SEC — SIGNIFICANT CHANGE UP (ref 24.5–35.6)
AST SERPL-CCNC: 22 U/L — SIGNIFICANT CHANGE UP (ref 10–40)
BASOPHILS # BLD AUTO: 0.02 K/UL — SIGNIFICANT CHANGE UP (ref 0–0.2)
BASOPHILS NFR BLD AUTO: 0.1 % — SIGNIFICANT CHANGE UP (ref 0–2)
BILIRUB SERPL-MCNC: 0.5 MG/DL — SIGNIFICANT CHANGE UP (ref 0.2–1.2)
BUN SERPL-MCNC: 22 MG/DL — HIGH (ref 7–18)
CALCIUM SERPL-MCNC: 9.2 MG/DL — SIGNIFICANT CHANGE UP (ref 8.4–10.5)
CHLORIDE SERPL-SCNC: 106 MMOL/L — SIGNIFICANT CHANGE UP (ref 96–108)
CK SERPL-CCNC: 94 U/L — SIGNIFICANT CHANGE UP (ref 35–232)
CO2 SERPL-SCNC: 26 MMOL/L — SIGNIFICANT CHANGE UP (ref 22–31)
CREAT SERPL-MCNC: 1.37 MG/DL — HIGH (ref 0.5–1.3)
EGFR: 58 ML/MIN/1.73M2 — LOW
EOSINOPHIL # BLD AUTO: 0.04 K/UL — SIGNIFICANT CHANGE UP (ref 0–0.5)
EOSINOPHIL NFR BLD AUTO: 0.3 % — SIGNIFICANT CHANGE UP (ref 0–6)
GLUCOSE SERPL-MCNC: 108 MG/DL — HIGH (ref 70–99)
HCT VFR BLD CALC: 37.3 % — LOW (ref 39–50)
HGB BLD-MCNC: 12.4 G/DL — LOW (ref 13–17)
IMM GRANULOCYTES NFR BLD AUTO: 0.5 % — SIGNIFICANT CHANGE UP (ref 0–0.9)
INR BLD: 0.91 RATIO — SIGNIFICANT CHANGE UP (ref 0.85–1.16)
LYMPHOCYTES # BLD AUTO: 53.5 % — HIGH (ref 13–44)
LYMPHOCYTES # BLD AUTO: 8.5 K/UL — HIGH (ref 1–3.3)
MAGNESIUM SERPL-MCNC: 2.4 MG/DL — SIGNIFICANT CHANGE UP (ref 1.6–2.6)
MCHC RBC-ENTMCNC: 28.4 PG — SIGNIFICANT CHANGE UP (ref 27–34)
MCHC RBC-ENTMCNC: 33.2 G/DL — SIGNIFICANT CHANGE UP (ref 32–36)
MCV RBC AUTO: 85.6 FL — SIGNIFICANT CHANGE UP (ref 80–100)
MONOCYTES # BLD AUTO: 0.73 K/UL — SIGNIFICANT CHANGE UP (ref 0–0.9)
MONOCYTES NFR BLD AUTO: 4.6 % — SIGNIFICANT CHANGE UP (ref 2–14)
NEUTROPHILS # BLD AUTO: 6.52 K/UL — SIGNIFICANT CHANGE UP (ref 1.8–7.4)
NEUTROPHILS NFR BLD AUTO: 41 % — LOW (ref 43–77)
NRBC # BLD: 0 /100 WBCS — SIGNIFICANT CHANGE UP (ref 0–0)
PLATELET # BLD AUTO: 135 K/UL — LOW (ref 150–400)
POTASSIUM SERPL-MCNC: 3.7 MMOL/L — SIGNIFICANT CHANGE UP (ref 3.5–5.3)
POTASSIUM SERPL-SCNC: 3.7 MMOL/L — SIGNIFICANT CHANGE UP (ref 3.5–5.3)
PROT SERPL-MCNC: 6.7 G/DL — SIGNIFICANT CHANGE UP (ref 6–8.3)
PROTHROM AB SERPL-ACNC: 10.6 SEC — SIGNIFICANT CHANGE UP (ref 9.9–13.4)
RBC # BLD: 4.36 M/UL — SIGNIFICANT CHANGE UP (ref 4.2–5.8)
RBC # FLD: 12.5 % — SIGNIFICANT CHANGE UP (ref 10.3–14.5)
SODIUM SERPL-SCNC: 138 MMOL/L — SIGNIFICANT CHANGE UP (ref 135–145)
TROPONIN I, HIGH SENSITIVITY RESULT: 5.3 NG/L — SIGNIFICANT CHANGE UP
WBC # BLD: 16.56 K/UL — HIGH (ref 3.8–10.5)
WBC # FLD AUTO: 16.56 K/UL — HIGH (ref 3.8–10.5)

## 2024-11-13 PROCEDURE — 70450 CT HEAD/BRAIN W/O DYE: CPT | Mod: 26,MC

## 2024-11-13 PROCEDURE — 74177 CT ABD & PELVIS W/CONTRAST: CPT | Mod: 26,MC

## 2024-11-13 PROCEDURE — 72125 CT NECK SPINE W/O DYE: CPT | Mod: 26,MC

## 2024-11-13 PROCEDURE — 99285 EMERGENCY DEPT VISIT HI MDM: CPT

## 2024-11-13 PROCEDURE — 71260 CT THORAX DX C+: CPT | Mod: 26,MC

## 2024-11-13 RX ORDER — ACETAMINOPHEN 500MG 500 MG/1
650 TABLET, COATED ORAL EVERY 6 HOURS
Refills: 0 | Status: DISCONTINUED | OUTPATIENT
Start: 2024-11-13 | End: 2024-11-14

## 2024-11-13 RX ORDER — ONDANSETRON HYDROCHLORIDE 4 MG/1
4 TABLET, FILM COATED ORAL ONCE
Refills: 0 | Status: COMPLETED | OUTPATIENT
Start: 2024-11-13 | End: 2024-11-13

## 2024-11-13 RX ORDER — SODIUM CHLORIDE 9 MG/ML
1000 INJECTION, SOLUTION INTRAMUSCULAR; INTRAVENOUS; SUBCUTANEOUS
Refills: 0 | Status: DISCONTINUED | OUTPATIENT
Start: 2024-11-13 | End: 2024-11-13

## 2024-11-13 RX ORDER — SODIUM CHLORIDE 9 MG/ML
1000 INJECTION, SOLUTION INTRAMUSCULAR; INTRAVENOUS; SUBCUTANEOUS
Refills: 0 | Status: DISCONTINUED | OUTPATIENT
Start: 2024-11-13 | End: 2024-11-18

## 2024-11-13 RX ORDER — SODIUM CHLORIDE 9 MG/ML
500 INJECTION, SOLUTION INTRAMUSCULAR; INTRAVENOUS; SUBCUTANEOUS ONCE
Refills: 0 | Status: COMPLETED | OUTPATIENT
Start: 2024-11-13 | End: 2024-11-13

## 2024-11-13 RX ORDER — KETOROLAC TROMETHAMINE 30 MG/ML
15 INJECTION INTRAMUSCULAR; INTRAVENOUS EVERY 6 HOURS
Refills: 0 | Status: DISCONTINUED | OUTPATIENT
Start: 2024-11-13 | End: 2024-11-13

## 2024-11-13 RX ORDER — OXYCODONE AND ACETAMINOPHEN 5; 325 MG/1; MG/1
1 TABLET ORAL EVERY 6 HOURS
Refills: 0 | Status: DISCONTINUED | OUTPATIENT
Start: 2024-11-13 | End: 2024-11-14

## 2024-11-13 RX ORDER — ACETAMINOPHEN, DIPHENHYDRAMINE HCL, PHENYLEPHRINE HCL 325; 25; 5 MG/1; MG/1; MG/1
3 TABLET ORAL AT BEDTIME
Refills: 0 | Status: DISCONTINUED | OUTPATIENT
Start: 2024-11-13 | End: 2024-11-18

## 2024-11-13 RX ADMIN — SODIUM CHLORIDE 125 MILLILITER(S): 9 INJECTION, SOLUTION INTRAMUSCULAR; INTRAVENOUS; SUBCUTANEOUS at 16:00

## 2024-11-13 RX ADMIN — ONDANSETRON HYDROCHLORIDE 4 MILLIGRAM(S): 4 TABLET, FILM COATED ORAL at 17:46

## 2024-11-13 RX ADMIN — Medication 4 MILLIGRAM(S): at 18:06

## 2024-11-13 RX ADMIN — Medication 4 MILLIGRAM(S): at 17:46

## 2024-11-13 NOTE — H&P ADULT - HISTORY OF PRESENT ILLNESS
64-year-old male with history of CLL, HLD, hep B, currently onTenofovir, pituitary adenoma on hydrocortisone 5 mg 2 in AM, 1 in p.m.  Patient claims got up to go to the bathroom, suddenly felt very dizzy, flushed, diaphoretic and he found himself lying in between the bathroom.  Patient with syncope.  He complaining of mid lower back pain, able to ambulate.  He denies CP, N/V, coughing, fever.  He   endorsed with chronic headache.  Patient took nothing for his pain, 10/10, worse with movement. 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 Wife at bedside also provided collateral information. Patient was feeling well all morning, and in the afternoon at 1 pm when walking to the bathroom he suddenly felt flushed and dizzy, had blurry vision and syncopized on the floor.  Patient does not remember very well regarding how he ended up on the floor or for how long he was on the floor.  When he woke up, he called his family who arrived to help him.  By the time his family came patient was able to stand and his dizziness improved.  After the fall patient states he felt diaphoretic  and was mildly confused. When he fell, patient hit his back on the floor. He denies any LOC, head trauma. Patient has a history of migraines but states they usually do not present this way or this suddenly and acutely.  In the ED, patient initially complained of sharp 10 out of 10 mid- lower back pain that is exacerbated by even minimal movement.  He is able to ambulate and move all extremities with no issues.  Denies any saddle anesthesia,  fecal or urinary incontinence, tingling in his lower extremities or radicular pain.  patient denies any symptoms of recent illness, nausea, vomiting, diarrhea, or fever.  Patient has previously had a similar episode occur 4 years ago in 2020.  At this time he came to the hospital and was found to have a pituitary adenoma for which he regularly follows with endocrinology Dr. Jenkins outpatient.  Patient is currently taking daily hydrocortisone and levothyroxine. On my exam in ED, patient endorses lower back pain that is worse when he moves.  His symptoms of dizziness have since resolved.  Patient admitted to medicine for syncope workup and acute L1 fracture.     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 Wife at bedside also provided collateral information. Patient was feeling well all morning, and in the afternoon at 1 pm when walking to the bathroom he suddenly felt flushed and dizzy, had blurry vision and syncopized on the floor.  Patient does not remember very well regarding how he ended up on the floor or for how long he was on the floor.  When he woke up, he called his family who arrived to help him.  By the time his family came patient was able to stand and his dizziness improved.  After the fall patient states he felt diaphoretic  and was mildly confused. When he fell, patient hit his back on the floor. He denies any LOC, head trauma. Patient has a history of migraines but states they usually do not present this way or this suddenly and acutely.  In the ED, patient initially complained of sharp 10 out of 10 mid- lower back pain that is exacerbated by even minimal movement.  He is able to ambulate and move all extremities with no issues.  Denies any saddle anesthesia,  fecal or urinary incontinence, tingling in his lower extremities or radicular pain.  patient denies any symptoms of recent illness, nausea, vomiting, diarrhea, or fever.  Patient has previously had a similar episode occur 4 years ago in 2020.  At this time he came to the hospital and was found to have a pituitary adenoma for which he regularly follows with endocrinology Dr. Jenkins outpatient.  Patient is currently taking daily hydrocortisone and levothyroxine. On my exam in ED, patient endorses lower back pain that is worse when he moves.  His symptoms of dizziness have since resolved.  Patient admitted to medicine for syncope workup and acute L1 fracture.

## 2024-11-13 NOTE — ED PROVIDER NOTE - OBJECTIVE STATEMENT
64-year-old male with history of CLL, HLD, hep B, currently onTenofovir, pituitary adenoma on hydrocortisone 5 mg 2 in AM, 1 in p.m.  Patient claims got up to go to the bathroom, suddenly felt very dizzy, flushed, diaphoretic and he found himself lying in between the bathroom.  Patient with syncope.  He complaining of mid lower back pain, able to ambulate.  He denies CP, N/V, coughing, fever.  He endorsed with chronic headache.  Patient took nothing for his pain, 10/10, worse with movement

## 2024-11-13 NOTE — H&P ADULT - ASSESSMENT
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 Wife at bedside also provided collateral information. Patient was feeling well all morning, and in the afternoon at 1 pm when walking to the bathroom he suddenly felt flushed and dizzy, had blurry vision and syncopized on the floor.  Patient does not remember very well regarding how he ended up on the floor or for how long he was on the floor.  When he woke up, he called his family who arrived to help him.  By the time his family came patient was able to stand and his dizziness improved.  After the fall patient states he felt diaphoretic  and was mildly confused. When he fell, patient hit his back on the floor. He denies any LOC, head trauma. Patient has a history of migraines but states they usually do not present this way or this suddenly and acutely.  In the ED, patient initially complained of sharp 10 out of 10 mid- lower back pain that is exacerbated by even minimal movement.  He is able to ambulate and move all extremities with no issues.  Denies any saddle anesthesia,  fecal or urinary incontinence, tingling in his lower extremities or radicular pain.  patient denies any symptoms of recent illness, nausea, vomiting, diarrhea, or fever.  Patient has previously had a similar episode occur 4 years ago in 2020.  At this time he came to the hospital and was found to have a pituitary adenoma for which he regularly follows with endocrinology Dr. Jenkins outpatient.  Patient is currently taking daily hydrocortisone and levothyroxine. On my exam in ED, patient endorses lower back pain that is worse when he moves. His symptoms of dizziness have since resolved.  Patient admitted to medicine for syncope workup and acute L1 fracture.            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  Patient admitted to medicine for syncope workup and acute L1 fracture.

## 2024-11-13 NOTE — H&P ADULT - NSICDXPASTMEDICALHX_GEN_ALL_CORE_FT
PAST MEDICAL HISTORY:  CLL (chronic lymphocytic leukemia)     Hepatitis B     Hypercholesterolemia     Pituitary adenoma

## 2024-11-13 NOTE — ED PROVIDER NOTE - CARE PLAN
1 Principal Discharge DX:	Syncope  Secondary Diagnosis:	REBECCA (acute kidney injury)   Principal Discharge DX:	Syncope  Secondary Diagnosis:	REBECCA (acute kidney injury)  Secondary Diagnosis:	Fracture of lumbar vertebra

## 2024-11-13 NOTE — ED ADULT NURSE NOTE - BEFAST SCREENING
LM indicating that her echo and dexa scan have not yet been done, will assist with scheduling if needed. Phone number of our office provided, identified self as Dr Linnea Matthews nurse. Negative

## 2024-11-13 NOTE — H&P ADULT - ATTENDING COMMENTS
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 Wife at bedside also provided collateral information. Patient was feeling well all morning, and in the afternoon at 1 pm when walking to the bathroom he suddenly felt flushed and dizzy, had blurry vision and syncopized on the floor.  Patient does not remember very well regarding how he ended up on the floor or for how long he was on the floor.  When he woke up, he called his family who arrived to help him.  By the time his family came patient was able to stand and his dizziness improved.  After the fall patient states he felt diaphoretic  and was mildly confused. When he fell, patient hit his back on the floor. He denies any LOC, head trauma. Patient has a history of migraines but states they usually do not present this way or this suddenly and acutely.  In the ED, patient initially complained of sharp 10 out of 10 mid- lower back pain that is exacerbated by even minimal movement.  He is able to ambulate and move all extremities with no issues.  Denies any saddle anesthesia,  fecal or urinary incontinence, tingling in his lower extremities or radicular pain.  patient denies any symptoms of recent illness, nausea, vomiting, diarrhea, or fever.  Patient has previously had a similar episode occur 4 years ago in 2020.  At this time he came to the hospital and was found to have a pituitary adenoma for which he regularly follows with endocrinology Dr. Jenkins outpatient.  Patient is currently taking daily hydrocortisone and levothyroxine. On my exam in ED, patient endorses lower back pain that is worse when he moves.  His symptoms of dizziness have since resolved.  Patient admitted to medicine for syncope workup and acute L1 fracture.     #  CASE D/W WIFE AT BEDSIDE. ALL QUESTIONS ANSWERED    # SYNCOPE  - TELEMETRY  - F/U ORTHOSTATIC VITALS  - F/U ECHO  - NOTED CT HEAD  - F/U PT EVAL  - CARDIOLOGY CONSULT  - NEUROLOGY CONSULT    # HX OF PANHYPOPITUITARISM [PITUITARY ADENOMA]  # CENTAL HYPOTHYROIDISM  # HYPERPROLACTINEMIA  # SECONDARY ADRENAL INSUFFICIENCY  # SECONDARY HYPOGONADISM  - F/U TSH  - F/U CORTISOL  - ON HYDROCORTISONE  - ENDOCRINOLOGY CONSULT    # BACK PAIN S/T ACUTE LUMBAR FRACTURE  - NOTED CT L SPINE  - PRN PAIN CONTROL  - F/U PT EVAL  - ORTHOSPINE CONSULT    # CLL ON ORAL CHEMOTHERAPY    # HEPATITIS B ON TENOFOVIR    # HX OF VESTIBULAR MIGRAINE    # VITAMIN D INSUFFICIENCY  - F/U 25OH VIT D  - SUPPLEMENT    # HYPERLIPIDEMIA  - F/U LIPID PANEL    # PRE-DM  - F/U HBA1C    # SMOKER  - COUNSELLED ON CESSATION > 10 MINS    # GI AND DVT PPX

## 2024-11-13 NOTE — ED PROVIDER NOTE - ENMT, MLM
Airway patent, Nasal mucosa clear. Mouth with normal mucosa. Throat has no vesicles, no oropharyngeal exudates and uvula is midline.  no tongue biting

## 2024-11-13 NOTE — H&P ADULT - PROBLEM SELECTOR PLAN 1
-p/w 1 episode of dizziness and syncope at home assoc with flush and blurry vision  - hx of pituitary adenoma  - monitor on tele  - f/u orthostatic VS  - CTH: A focus of increased density in the pituitary gland suggesting calcification. (similar to prior)  - Neuro consult (consider MRI)  - f/u cortisol, TSH  - f/u TTE (consult [facility])

## 2024-11-13 NOTE — ED PROVIDER NOTE - MUSCULOSKELETAL, MLM
Spine appears normal, range of motion is not limited, Lt mid/flank/lower back-tenderness to palp., no hematoma

## 2024-11-13 NOTE — ED ADULT NURSE NOTE - NSFALLRISKINTERV_ED_ALL_ED

## 2024-11-13 NOTE — H&P ADULT - NSHPPHYSICALEXAM_GEN_ALL_CORE
PHYSICAL EXAM:  GENERAL: NAD, speaks in full sentences, no signs of respiratory distress  HEAD:  Atraumatic, Normocephalic  EYES: EOMI, PERRLA, conjunctiva and sclera clear  NECK: Supple, No JVD  CHEST/LUNG: Clear to auscultation bilaterally; No wheeze; No crackles; No accessory muscles used  HEART: Regular rate and rhythm; No murmurs;   ABDOMEN: Soft, Nontender, Nondistended; Bowel sounds present; No guarding  EXTREMITIES:  2+ Peripheral Pulses, No cyanosis or edema  MSK: +low back pain  PSYCH: AAOx3  NEUROLOGY: non-focal  SKIN: No rashes or lesions

## 2024-11-13 NOTE — H&P ADULT - NSHPREVIEWOFSYSTEMS_GEN_ALL_CORE
- CONSTITUTIONAL: Denies fever and chills  - HEENT: Denies changes in vision and hearing.  - RESPIRATORY: Denies SOB and cough.  - CV: Denies chest pain and palpitations  - GI: Denies abdominal pain, nausea, vomiting and diarrhea.  - : Denies dysuria and urinary frequency.  - SKIN: Denies rash and pruritus.  - NEUROLOGICAL: Denies headache and syncope.  - MSK: Low back pain T 12 region  - PSYCHIATRIC: Denies recent changes in mood. Denies anxiety and depression.

## 2024-11-13 NOTE — ED ADULT NURSE NOTE - OBJECTIVE STATEMENT
BIBA  s/p syncope in bathroom ,pt stated he went to bathroom after a nose bleed around 12 noon and passed out in bathroom, not sure for how long c/o lower back pain no blood thinners/safety maintained /side rals up /bed alarm on /famILY AT BED SIDE

## 2024-11-13 NOTE — H&P ADULT - PROBLEM SELECTOR PLAN 2
L1 acute lumbar fracture noted on CT spine  p/w LBP worse with exertion    pain control PRN- Tylenol, Percocet, Morphine  pain management consult  PT consult f/u   Ortho-spine consult in AM L1 acute lumbar fracture noted on CT spine  p/w LBP worse with exertion    pain control PRN- Tylenol, Percocet, Morphine  > d/c morphine (pt nausea w 2 episodes of vomiting in ED post morphine)  pain management consult  PT consult f/u   Ortho-spine consult in AM L1 acute lumbar fracture noted on CT spine  p/w LBP worse with exertion    pain control PRN- Tylenol, Percocet, Morphine  > d/c morphine (pt nausea w 2 episodes of vomiting in ED post morphine)  pain management consulted  PT consult f/u   Ortho-spine consult in AM

## 2024-11-13 NOTE — H&P ADULT - PROBLEM SELECTOR PLAN 4
- CTH: A focus of increased density in the pituitary gland suggesting calcification. (similar to prior)  - pt has history of pit adenoma dx in 2020, follows outpt with Endocrinology  - c/w home med Hydrocortisone 10 mg in AM, 5 mg in PM  - f/u cortisol level  - Endo Dr. Damian consulted - CTH: A focus of increased density in the pituitary gland suggesting calcification. (similar to prior)  - pt has history of pit adenoma dx in 2020, follows outpt with Endocrinology per [facility]IE  - c/w home med Hydrocortisone 10 mg in AM, 5 mg in PM  - f/u cortisol level  - Endo Dr. Damian consulted

## 2024-11-13 NOTE — ED PROVIDER NOTE - CLINICAL SUMMARY MEDICAL DECISION MAKING FREE TEXT BOX
64-year-old male with history of CLL, HLD, hep B currently on Tenofovir, also on hydrocortisone for pituitary adenoma.  Patient with an episode of syncope, unable to recall the episode.  Unlikely patient with seizures.  Concern for ACS, dehydration, will get labs troponin, give IV fluids, get CAT scan to rule out rib fracture organ injury, will admit

## 2024-11-13 NOTE — ED ADULT NURSE NOTE - PRIMARY CARE PROVIDER
HPI:    Patient ID: Nena Patiño is a 37year old male. On + if discomfort chest x months. Worse last 3 months  Better w/ activity  Breathing OK  W/o palp  Caffeine - w/o  Stress high  Taking medications as directed. Denies pain but feels a discomfort.
unkwn

## 2024-11-13 NOTE — ED ADULT TRIAGE NOTE - CHIEF COMPLAINT QUOTE
BIBA  s/p syncope in bathroom ,pt stated he went to bathroom after a nose bleed around 12 noon and passed out in bathroom, not sure for how long  c/o lower back pain  no blood thinners

## 2024-11-13 NOTE — ED PROVIDER NOTE - NS ED ATTENDING STATEMENT MOD
October 9, 2018     Patient: Zoey Portillo   YOB: 1962   Date of Visit: 10/9/2018       To Whom it May Concern:    Zoey Portillo is under my professional care  She was seen in my office on 10/9/2018  Her doctor's visits to orthopaedic surgery clinic and occupational therapy sessions for her wrist are a direct result from her initial injury requiring wrist surgery (which was originally covered under worker's compensation)  Beyond any reasonable doubt, these visits and therapy sessions should be billable and payments covered under the [employer] as they are a direct sequela of her initial injury  If you have any questions or concerns, please don't hesitate to call           Sincerely,          Isra Contreras MD        CC: No Recipients Attending Only

## 2024-11-13 NOTE — ED PROVIDER NOTE - PROGRESS NOTE DETAILS
Labs/CT result explained to patient and wife   patient with L1 vertebral fracture, syncope, will admit patient   patient's PCP is Dr. Dixon, covered by Dr. Harper.   Advised to admit patient under Dr. Ignacio,  Discussed case with Dr. Ignacio

## 2024-11-14 DIAGNOSIS — B19.10 UNSPECIFIED VIRAL HEPATITIS B WITHOUT HEPATIC COMA: ICD-10-CM

## 2024-11-14 DIAGNOSIS — R55 SYNCOPE AND COLLAPSE: ICD-10-CM

## 2024-11-14 DIAGNOSIS — C91.10 CHRONIC LYMPHOCYTIC LEUKEMIA OF B-CELL TYPE NOT HAVING ACHIEVED REMISSION: ICD-10-CM

## 2024-11-14 DIAGNOSIS — D35.2 BENIGN NEOPLASM OF PITUITARY GLAND: ICD-10-CM

## 2024-11-14 DIAGNOSIS — E27.40 UNSPECIFIED ADRENOCORTICAL INSUFFICIENCY: ICD-10-CM

## 2024-11-14 DIAGNOSIS — Z87.898 PERSONAL HISTORY OF OTHER SPECIFIED CONDITIONS: ICD-10-CM

## 2024-11-14 DIAGNOSIS — S32.009A UNSPECIFIED FRACTURE OF UNSPECIFIED LUMBAR VERTEBRA, INITIAL ENCOUNTER FOR CLOSED FRACTURE: ICD-10-CM

## 2024-11-14 DIAGNOSIS — E03.9 HYPOTHYROIDISM, UNSPECIFIED: ICD-10-CM

## 2024-11-14 DIAGNOSIS — Z29.9 ENCOUNTER FOR PROPHYLACTIC MEASURES, UNSPECIFIED: ICD-10-CM

## 2024-11-14 DIAGNOSIS — N17.9 ACUTE KIDNEY FAILURE, UNSPECIFIED: ICD-10-CM

## 2024-11-14 DIAGNOSIS — M54.50 LOW BACK PAIN, UNSPECIFIED: ICD-10-CM

## 2024-11-14 LAB
ALBUMIN SERPL ELPH-MCNC: 3.8 G/DL — SIGNIFICANT CHANGE UP (ref 3.5–5)
ALP SERPL-CCNC: 69 U/L — SIGNIFICANT CHANGE UP (ref 40–120)
ALT FLD-CCNC: 20 U/L DA — SIGNIFICANT CHANGE UP (ref 10–60)
ANION GAP SERPL CALC-SCNC: 4 MMOL/L — LOW (ref 5–17)
AST SERPL-CCNC: 19 U/L — SIGNIFICANT CHANGE UP (ref 10–40)
BASOPHILS # BLD AUTO: 0.04 K/UL — SIGNIFICANT CHANGE UP (ref 0–0.2)
BASOPHILS NFR BLD AUTO: 0.2 % — SIGNIFICANT CHANGE UP (ref 0–2)
BILIRUB SERPL-MCNC: 0.7 MG/DL — SIGNIFICANT CHANGE UP (ref 0.2–1.2)
BUN SERPL-MCNC: 19 MG/DL — HIGH (ref 7–18)
CALCIUM SERPL-MCNC: 9.1 MG/DL — SIGNIFICANT CHANGE UP (ref 8.4–10.5)
CHLORIDE SERPL-SCNC: 109 MMOL/L — HIGH (ref 96–108)
CO2 SERPL-SCNC: 25 MMOL/L — SIGNIFICANT CHANGE UP (ref 22–31)
CORTIS AM PEAK SERPL-MCNC: 1 UG/DL — LOW (ref 6–18.4)
CREAT SERPL-MCNC: 1.34 MG/DL — HIGH (ref 0.5–1.3)
D DIMER BLD IA.RAPID-MCNC: 213 NG/ML DDU — SIGNIFICANT CHANGE UP
EGFR: 60 ML/MIN/1.73M2 — SIGNIFICANT CHANGE UP
EOSINOPHIL # BLD AUTO: 0.06 K/UL — SIGNIFICANT CHANGE UP (ref 0–0.5)
EOSINOPHIL NFR BLD AUTO: 0.3 % — SIGNIFICANT CHANGE UP (ref 0–6)
GLUCOSE SERPL-MCNC: 101 MG/DL — HIGH (ref 70–99)
HCT VFR BLD CALC: 38 % — LOW (ref 39–50)
HGB BLD-MCNC: 12.8 G/DL — LOW (ref 13–17)
IMM GRANULOCYTES NFR BLD AUTO: 0.2 % — SIGNIFICANT CHANGE UP (ref 0–0.9)
LYMPHOCYTES # BLD AUTO: 13.13 K/UL — HIGH (ref 1–3.3)
LYMPHOCYTES # BLD AUTO: 70.2 % — HIGH (ref 13–44)
MCHC RBC-ENTMCNC: 28.3 PG — SIGNIFICANT CHANGE UP (ref 27–34)
MCHC RBC-ENTMCNC: 33.7 G/DL — SIGNIFICANT CHANGE UP (ref 32–36)
MCV RBC AUTO: 84.1 FL — SIGNIFICANT CHANGE UP (ref 80–100)
MONOCYTES # BLD AUTO: 0.81 K/UL — SIGNIFICANT CHANGE UP (ref 0–0.9)
MONOCYTES NFR BLD AUTO: 4.3 % — SIGNIFICANT CHANGE UP (ref 2–14)
NEUTROPHILS # BLD AUTO: 4.62 K/UL — SIGNIFICANT CHANGE UP (ref 1.8–7.4)
NEUTROPHILS NFR BLD AUTO: 24.8 % — LOW (ref 43–77)
NRBC # BLD: 0 /100 WBCS — SIGNIFICANT CHANGE UP (ref 0–0)
PLATELET # BLD AUTO: 144 K/UL — LOW (ref 150–400)
POTASSIUM SERPL-MCNC: 4.2 MMOL/L — SIGNIFICANT CHANGE UP (ref 3.5–5.3)
POTASSIUM SERPL-SCNC: 4.2 MMOL/L — SIGNIFICANT CHANGE UP (ref 3.5–5.3)
PROT SERPL-MCNC: 6.6 G/DL — SIGNIFICANT CHANGE UP (ref 6–8.3)
RBC # BLD: 4.52 M/UL — SIGNIFICANT CHANGE UP (ref 4.2–5.8)
RBC # FLD: 12.7 % — SIGNIFICANT CHANGE UP (ref 10.3–14.5)
SODIUM SERPL-SCNC: 138 MMOL/L — SIGNIFICANT CHANGE UP (ref 135–145)
TROPONIN I, HIGH SENSITIVITY RESULT: 4.6 NG/L — SIGNIFICANT CHANGE UP
TSH SERPL-MCNC: <0.01 UU/ML — LOW (ref 0.34–4.82)
WBC # BLD: 18.7 K/UL — HIGH (ref 3.8–10.5)
WBC # FLD AUTO: 18.7 K/UL — HIGH (ref 3.8–10.5)

## 2024-11-14 PROCEDURE — 99253 IP/OBS CNSLTJ NEW/EST LOW 45: CPT

## 2024-11-14 PROCEDURE — 99255 IP/OBS CONSLTJ NEW/EST HI 80: CPT

## 2024-11-14 RX ORDER — OXYCODONE HYDROCHLORIDE 30 MG/1
5 TABLET ORAL EVERY 4 HOURS
Refills: 0 | Status: DISCONTINUED | OUTPATIENT
Start: 2024-11-14 | End: 2024-11-18

## 2024-11-14 RX ORDER — ACALABRUTINIB 100 MG/1
1 CAPSULE, GELATIN COATED ORAL
Refills: 0 | DISCHARGE

## 2024-11-14 RX ORDER — TENOFOVIR DISOPROXIL FUMARATE TABLETS 300 MG/1
300 TABLET, FILM COATED ORAL DAILY
Refills: 0 | Status: DISCONTINUED | OUTPATIENT
Start: 2024-11-14 | End: 2024-11-18

## 2024-11-14 RX ORDER — INFLUENZA VIRUS VACCINE 15; 15; 15; 15 UG/.5ML; UG/.5ML; UG/.5ML; UG/.5ML
0.5 SUSPENSION INTRAMUSCULAR ONCE
Refills: 0 | Status: COMPLETED | OUTPATIENT
Start: 2024-11-14 | End: 2024-11-14

## 2024-11-14 RX ORDER — HYDROCORTISONE ACETATE 25 MG/ML
10 VIAL (ML) INJECTION
Refills: 0 | Status: DISCONTINUED | OUTPATIENT
Start: 2024-11-14 | End: 2024-11-18

## 2024-11-14 RX ORDER — LIDOCAINE 40 MG/G
1 CREAM TOPICAL DAILY
Refills: 0 | Status: DISCONTINUED | OUTPATIENT
Start: 2024-11-14 | End: 2024-11-18

## 2024-11-14 RX ORDER — LEVOTHYROXINE SODIUM 150 MCG
1 TABLET ORAL
Refills: 0 | DISCHARGE

## 2024-11-14 RX ORDER — ONDANSETRON HYDROCHLORIDE 4 MG/1
4 TABLET, FILM COATED ORAL ONCE
Refills: 0 | Status: COMPLETED | OUTPATIENT
Start: 2024-11-14 | End: 2024-11-14

## 2024-11-14 RX ORDER — LEVOTHYROXINE SODIUM 150 MCG
88 TABLET ORAL DAILY
Refills: 0 | Status: DISCONTINUED | OUTPATIENT
Start: 2024-11-14 | End: 2024-11-18

## 2024-11-14 RX ORDER — ACETAMINOPHEN 500MG 500 MG/1
1000 TABLET, COATED ORAL EVERY 8 HOURS
Refills: 0 | Status: COMPLETED | OUTPATIENT
Start: 2024-11-14 | End: 2024-11-18

## 2024-11-14 RX ORDER — HYDROCORTISONE ACETATE 25 MG/ML
5 VIAL (ML) INJECTION
Refills: 0 | Status: DISCONTINUED | OUTPATIENT
Start: 2024-11-14 | End: 2024-11-18

## 2024-11-14 RX ORDER — TENOFOVIR DISOPROXIL FUMARATE TABLETS 300 MG/1
1 TABLET, FILM COATED ORAL
Refills: 0 | DISCHARGE

## 2024-11-14 RX ORDER — METHOCARBAMOL 500 MG/1
500 TABLET, FILM COATED ORAL EVERY 8 HOURS
Refills: 0 | Status: DISCONTINUED | OUTPATIENT
Start: 2024-11-14 | End: 2024-11-15

## 2024-11-14 RX ADMIN — ACETAMINOPHEN 500MG 1000 MILLIGRAM(S): 500 TABLET, COATED ORAL at 11:40

## 2024-11-14 RX ADMIN — LIDOCAINE 1 PATCH: 40 CREAM TOPICAL at 11:40

## 2024-11-14 RX ADMIN — LIDOCAINE 1 PATCH: 40 CREAM TOPICAL at 22:54

## 2024-11-14 RX ADMIN — ACETAMINOPHEN 500MG 1000 MILLIGRAM(S): 500 TABLET, COATED ORAL at 12:10

## 2024-11-14 RX ADMIN — Medication 10 MILLIGRAM(S): at 08:42

## 2024-11-14 RX ADMIN — TENOFOVIR DISOPROXIL FUMARATE TABLETS 300 MILLIGRAM(S): 300 TABLET, FILM COATED ORAL at 12:42

## 2024-11-14 RX ADMIN — Medication 88 MICROGRAM(S): at 05:33

## 2024-11-14 RX ADMIN — ACETAMINOPHEN 500MG 1000 MILLIGRAM(S): 500 TABLET, COATED ORAL at 17:45

## 2024-11-14 RX ADMIN — LIDOCAINE 1 PATCH: 40 CREAM TOPICAL at 20:50

## 2024-11-14 RX ADMIN — Medication 5 MILLIGRAM(S): at 17:50

## 2024-11-14 RX ADMIN — Medication 2 MILLIGRAM(S): at 02:12

## 2024-11-14 RX ADMIN — ACETAMINOPHEN 500MG 1000 MILLIGRAM(S): 500 TABLET, COATED ORAL at 21:33

## 2024-11-14 RX ADMIN — ACETAMINOPHEN 500MG 1000 MILLIGRAM(S): 500 TABLET, COATED ORAL at 22:33

## 2024-11-14 RX ADMIN — ACETAMINOPHEN 500MG 1000 MILLIGRAM(S): 500 TABLET, COATED ORAL at 15:45

## 2024-11-14 RX ADMIN — ONDANSETRON HYDROCHLORIDE 4 MILLIGRAM(S): 4 TABLET, FILM COATED ORAL at 06:04

## 2024-11-14 RX ADMIN — SODIUM CHLORIDE 500 MILLILITER(S): 9 INJECTION, SOLUTION INTRAMUSCULAR; INTRAVENOUS; SUBCUTANEOUS at 00:57

## 2024-11-14 NOTE — CONSULT NOTE ADULT - SUBJECTIVE AND OBJECTIVE BOX
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 Wife at bedside also provided collateral information. Patient was feeling well all morning, and in the afternoon at 1 pm when walking to the bathroom he suddenly felt flushed and dizzy, had blurry vision and syncopized on the floor.  Patient does not remember very well regarding how he ended up on the floor or for how long he was on the floor.  When he woke up, he called his family who arrived to help him.  By the time his family came patient was able to stand and his dizziness improved.  After the fall patient states he felt diaphoretic  and was mildly confused. When he fell, patient hit his back on the floor. He denies any LOC, head trauma. Patient has a history of migraines but states they usually do not present this way or this suddenly and acutely.  In the ED, patient initially complained of sharp 10 out of 10 mid- lower back pain that is exacerbated by even minimal movement.  He is able to ambulate and move all extremities with no issues.  Denies any saddle anesthesia,  fecal or urinary incontinence, tingling in his lower extremities or radicular pain.  patient denies any symptoms of recent illness, nausea, vomiting, diarrhea, or fever.  Patient has previously had a similar episode occur 4 years ago in 2020.  At this time he came to the hospital and was found to have a pituitary adenoma for which he regularly follows with endocrinology Dr. Jenkins outpatient.  Patient is currently taking daily hydrocortisone and levothyroxine. On my exam in ED, patient endorses lower back pain that is worse when he moves.  His symptoms of dizziness have since resolved.  Patient admitted to medicine for syncope workup and acute L1 fracture.   He has no CP, SOB and debnies hx of annieon CAD         (13 Nov 2024 21:41)      PAST MEDICAL & SURGICAL HISTORY:  Hypercholesterolemia  CLL (chronic lymphocytic leukemia)  Pituitary adenoma  Hepatitis B  S/P appendectomy  10 years ago      MEDICATIONS:  MEDICATIONS  (STANDING):  acetaminophen     Tablet .. 1000 milliGRAM(s) Oral every 8 hours  hydrocortisone 10 milliGRAM(s) Oral <User Schedule>  hydrocortisone 5 milliGRAM(s) Oral <User Schedule>  levothyroxine 88 MICROGram(s) Oral daily  lidocaine   4% Patch 1 Patch Transdermal daily  sodium chloride 0.9%. 1000 milliLiter(s) (125 mL/Hr) IV Continuous <Continuous>  tenofovir disoproxil fumarate (VIREAD) 300 milliGRAM(s) Oral daily      Allergies    No Known Allergies    Intolerances        FAMILY HISTORY:    Non-contributary for premature coronary disease or sudden cardiac death    SOCIAL HISTORY:    [ ] Non-smoker  [ X] Smoker  [ ] Alcohol        REVIEW OF SYSTEMS:  [ ]chest pain  [  ]shortness of breath  [  ]palpitations  [  ]syncope  [ ]near syncope [ ]upper extremity weakness   [ ] lower extremity weakness  [  ]diplopia  [  ]altered mental status   [  ]fevers  [ ]chills [ ]nausea  [ ]vomitting  [  ]dysphagia    [ ]abdominal pain  [ ]melena  [ ]BRBPR    [  ]epistaxis  [  ]rash    [ ]lower extremity edema        [X] All others negative	  [ ] Unable to obtain      LABS:	 	    CARDIAC MARKERS:        Troponin I, High Sensitivity Result: 5.3 ng/L (11-13-24 @ 15:56)                            12.8   18.70 )-----------( 144      ( 14 Nov 2024 05:25 )             38.0     Hb Trend: 12.8<--, 12.4<--    11-14    138  |  109[H]  |  19[H]  ----------------------------<  101[H]  4.2   |  25  |  1.34[H]    Ca    9.1      14 Nov 2024 05:25  Mg     2.4     11-13    TPro  6.6  /  Alb  3.8  /  TBili  0.7  /  DBili  x   /  AST  19  /  ALT  20  /  AlkPhos  69  11-14    Creatinine Trend: 1.34<--, 1.37<--    TSH: Thyroid Stimulating Hormone, Serum: <0.01 uU/mL (11-14 @ 05:25)          PHYSICAL EXAM:  T(C): 36.7 (11-14-24 @ 11:08), Max: 37.1 (11-14-24 @ 05:23)  HR: 79 (11-14-24 @ 11:08) (68 - 85)  BP: 107/71 (11-14-24 @ 11:08) (101/65 - 146/83)  RR: 18 (11-14-24 @ 11:08) (18 - 18)  SpO2: 97% (11-14-24 @ 11:08) (96% - 100%)  Wt(kg): --   BMI (kg/m2): 25 (11-13-24 @ 14:44)  I&O's Summary      HEENT:  (-)icterus (-)pallor  CV: N S1 S2 1/6 KELLY (+)2 Pulses B/l  Resp:  Clear to ausculatation B/L, normal effort  GI: (+) BS Soft, NT, ND  Lymph:  (-)Edema, (-)obvious lymphadenopathy  Skin: Warm to touch, Normal turgor  Psych: Appropriate mood and affect        ECG:  	Sinus     RADIOLOGY:         CXR:   PENDING    ASSESSMENT/PLAN: 	64y Male  with history of 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  If you have any questions or concerns please do not hesitate to contact me.    Víctor Mina MD, [facility]  BEEPER [phone]

## 2024-11-14 NOTE — CONSULT NOTE ADULT - CONSULT REASON
Back pain post mechanical fall with L1 vertebral fracture. Back pain post fall with L1 vertebral fracture.

## 2024-11-14 NOTE — CONSULT NOTE ADULT - NS ATTEND AMEND GEN_ALL_CORE FT
Impression: This is a 65 Y/O male active smoker .  With CLL on oral chemotherapy, hepatitis B on Tenofovir. Presented to ED after a fall in his home with symptoms of Dizziness which was resolved at this time . Patient endorses lower back pain that is worse when he moves.  His symptoms of dizziness have since resolved.  Admitted to medicine for syncope workup and acute L1 fracture. For pulmonary evaluation due to showing CT Chest with Paraseptal Emphysema . No Pleural Effusion.     Suggestion:  - O2 saturation 97% room air. So far saturating good room air.  - Reinforced the importance of smoking cessation , not willing to quit, per pt he will think about it but will follow the hospital protocol of no smoking.   - Cardiology following for Syncope.  - No Lung work up for Emphysema at this time. Can benefit with outpatient PFT .  - Ortho eval   - Pt eval

## 2024-11-14 NOTE — PATIENT PROFILE ADULT - FALL HARM RISK - HARM RISK INTERVENTIONS
Assistance with ambulation/Assistance OOB with selected safe patient handling equipment/Communicate Risk of Fall with Harm to all staff/Monitor gait and stability/Reinforce activity limits and safety measures with patient and family/Sit up slowly, dangle for a short time, stand at bedside before walking/Tailored Fall Risk Interventions/Visual Cue: Yellow wristband and red socks/Bed in lowest position, wheels locked, appropriate side rails in place/Call bell, personal items and telephone in reach/Instruct patient to call for assistance before getting out of bed or chair/Non-slip footwear when patient is out of bed/[place] to call system/Physically safe environment - no spills, clutter or unnecessary equipment/Purposeful Proactive Rounding/Room/bathroom lighting operational, light cord in reach

## 2024-11-14 NOTE — CONSULT NOTE ADULT - SUBJECTIVE AND OBJECTIVE BOX
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 Wife at bedside also provided collateral information. Patient was feeling well all morning, and in the afternoon at 1 pm when walking to the bathroom he suddenly felt flushed and dizzy, had blurry vision and syncopized on the floor.  Patient does not remember very well regarding how he ended up on the floor or for how long he was on the floor.  When he woke up, he called his family who arrived to help him.  By the time his family came patient was able to stand and his dizziness improved.  After the fall patient states he felt diaphoretic  and was mildly confused. When he fell, patient hit his back on the floor. He denies any LOC, head trauma. Patient has a history of migraines but states they usually do not present this way or this suddenly and acutely.  In the ED, patient initially complained of sharp 10 out of 10 mid- lower back pain that is exacerbated by even minimal movement.  He is able to ambulate and move all extremities with no issues.  Denies any saddle anesthesia,  fecal or urinary incontinence, tingling in his lower extremities or radicular pain.  patient denies any symptoms of recent illness, nausea, vomiting, diarrhea, or fever.  Patient has previously had a similar episode occur 4 years ago in 2020.  At this time he came to the hospital and was found to have a pituitary adenoma for which he regularly follows with endocrinology Dr. Jenkins outpatient.  Patient is currently taking daily hydrocortisone and levothyroxine. On my exam in ED, patient endorses lower back pain that is worse when he moves.  His symptoms of dizziness have since resolved.  Patient admitted to medicine for syncope workup and acute L1 fracture.          (13 Nov 2024 21:41)        Review of systems:  Constitutional:  Constitutional: No fever, weight loss, fatigue, low energy, generalized weakness, poor appetite  Eyes: No redness, no dryness, no pain, no tearing, no gritty or sarah feeling, no bulging  Cardiovascular/ Respiratory: No palpitations,, no chest pain, no shortness of breath, no exercise intolerance, no cough, no leg/ ankle swelling  Gastrointestinal: No trouble swallowing, no heart burn, no abdominal pain, no bloating, no nausea, no vomiting, no constipation, no diarrhea, no frequent bowel movements  Skin: No excessive hair growth, no hair loss, no acne, no excessive sweating, no rash, no easy bruising  Neurological: No headaches, no change in vision, no dizziness/ lightheadedness, no tremors, no numbness/ tingling in feet, no pain/ burning in feet, no trouble with balance, no muscular weakness.   Endocrine: Frequent urination, excessive urination, excessive thirst, symptoms     PAST MEDICAL & SURGICAL HISTORY:  Hypercholesterolemia      CLL (chronic lymphocytic leukemia)      Pituitary adenoma      Hepatitis B      S/P appendectomy  10 years ago          FAMILY HISTORY:      Social History:  Occupation:  Marital status/Lives with  Exercise:  Tobacco:  Alcohol:  illicit drug abuse:  Health insurance status:    MEDICATIONS  (STANDING):  acetaminophen     Tablet .. 1000 milliGRAM(s) Oral every 8 hours  hydrocortisone 5 milliGRAM(s) Oral <User Schedule>  hydrocortisone 10 milliGRAM(s) Oral <User Schedule>  levothyroxine 88 MICROGram(s) Oral daily  lidocaine   4% Patch 1 Patch Transdermal daily  sodium chloride 0.9%. 1000 milliLiter(s) (125 mL/Hr) IV Continuous <Continuous>  tenofovir disoproxil fumarate (VIREAD) 300 milliGRAM(s) Oral daily    MEDICATIONS  (PRN):  melatonin 3 milliGRAM(s) Oral at bedtime PRN Insomnia  methocarbamol 500 milliGRAM(s) Oral every 8 hours PRN Muscle Spasm  oxyCODONE    IR 5 milliGRAM(s) Oral every 4 hours PRN Severe Pain (7 - 10)      Physical Examination  Vital Signs Last 24 Hrs  T(C): 36.8 (14 Nov 2024 15:29), Max: 37.1 (14 Nov 2024 05:23)  T(F): 98.3 (14 Nov 2024 15:29), Max: 98.7 (14 Nov 2024 05:23)  HR: 78 (14 Nov 2024 15:29) (73 - 85)  BP: 104/68 (14 Nov 2024 15:29) (101/65 - 124/77)  BP(mean): 75 (14 Nov 2024 05:23) (75 - 75)  RR: 17 (14 Nov 2024 15:29) (17 - 18)  SpO2: 95% (14 Nov 2024 15:29) (95% - 98%)    Parameters below as of 14 Nov 2024 15:29  Patient On (Oxygen Delivery Method): room air      Constitutional: No acute distress, ill- appearing, no anxious appearing, hyperkinetic, no diaphoretic  HEENT: Moist mucous membranes  Neck:  No JVD, bruits or thyromegaly, No thyroid nodules palpable, no LAD  Respiratory:  Respiratory effort normal, lungs clear to ausculation, without rales or rhonchi  Cardiovascular:  Regular heart rate, normal S1 and S2 sounds, without murmur, rub or gallop.  Gastrointestinal: Soft, non tender without hepatosplenomegaly and masses, no abdominal obesity  Extremities: Sensation intact to monofilament in feet, no cyanosis, clubbing or edema, positive pedal pulses  Neurological:  Oriented to person, place and time, No gross sensory or motor defects, visual fields intact to confrontation, normal deep tendon reflexes    Labs:                        12.8   18.70 )-----------( 144      ( 14 Nov 2024 05:25 )             38.0     11-14    138  |  109[H]  |  19[H]  ----------------------------<  101[H]  4.2   |  25  |  1.34[H]    Ca    9.1      14 Nov 2024 05:25  Mg     2.4     11-13    TPro  6.6  /  Alb  3.8  /  TBili  0.7  /  DBili  x   /  AST  19  /  ALT  20  /  AlkPhos  69  11-14        PT/INR - ( 13 Nov 2024 15:56 )   PT: 10.6 sec;   INR: 0.91 ratio         PTT - ( 13 Nov 2024 15:56 )  PTT:32.7 sec  Urinalysis Basic - ( 14 Nov 2024 05:25 )    Color: x / Appearance: x / SG: x / pH: x  Gluc: 101 mg/dL / Ketone: x  / Bili: x / Urobili: x   Blood: x / Protein: x / Nitrite: x   Leuk Esterase: x / RBC: x / WBC x   Sq Epi: x / Non Sq Epi: x / Bacteria: x        Assessment and Plan:     Endocrine initial consult note   64 year old  Male who presents with a chief complaint of Syncope (14 Nov 2024 13:00)    HPI:   64-year-old male with 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 Wife at bedside also provided collateral information. Patient was feeling well all morning, and in the afternoon at 1 pm when walking to the bathroom he suddenly felt flushed and dizzy, had blurry vision and syncopized on the floor.  Patient does not remember very well regarding how he ended up on the floor or for how long he was on the floor.  When he woke up, he called his family who arrived to help him.  By the time his family came patient was able to stand and his dizziness improved.  After the fall patient states he felt diaphoretic  and was mildly confused. When he fell, patient hit his back on the floor. He denies any LOC, head trauma. Patient has a history of migraines but states they usually do not present this way or this suddenly and acutely.  In the ED, patient initially complained of sharp 10 out of 10 mid- lower back pain that is exacerbated by even minimal movement.  He is able to ambulate and move all extremities with no issues.  Denies any saddle anesthesia,  fecal or urinary incontinence, tingling in his lower extremities or radicular pain.  patient denies any symptoms of recent illness, nausea, vomiting, diarrhea, or fever.  Patient has previously had a similar episode occur 4 years ago in 2020.  At this time he came to the hospital and was found to have a pituitary adenoma for which he regularly follows with endocrinology Dr. Jenkins outpatient.  Patient is currently taking daily hydrocortisone and levothyroxine. On my exam in ED, patient endorses lower back pain that is worse when he moves.  His symptoms of dizziness have since resolved.  Patient admitted to medicine for syncope workup and acute L1 fracture. (13 Nov 2024 21:41). 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 Hx also taken from chart record. Patient had             Review of systems:  Constitutional:  Constitutional: No fever, weight loss, fatigue, low energy, generalized weakness, poor appetite  Eyes: No redness, no dryness, no pain, no tearing, no gritty or sarah feeling, no bulging  Cardiovascular/ Respiratory: No palpitations,, no chest pain, no shortness of breath, no exercise intolerance, no cough, no leg/ ankle swelling  Gastrointestinal: No trouble swallowing, no heart burn, no abdominal pain, no bloating, no nausea, no vomiting, no constipation, no diarrhea, no frequent bowel movements  Skin: No excessive hair growth, no hair loss, no acne, no excessive sweating, no rash, no easy bruising  Neurological: No headaches, no change in vision, no dizziness/ lightheadedness, no tremors, no numbness/ tingling in feet, no pain/ burning in feet, no trouble with balance, no muscular weakness.   Endocrine: Frequent urination, excessive urination, excessive thirst, symptoms     PAST MEDICAL & SURGICAL HISTORY:  Hypercholesterolemia      CLL (chronic lymphocytic leukemia)      Pituitary adenoma      Hepatitis B      S/P appendectomy  10 years ago          FAMILY HISTORY:      Social History:  Occupation:  Marital status/Lives with  Exercise:  Tobacco:  Alcohol:  illicit drug abuse:  Health insurance status:    MEDICATIONS  (STANDING):  acetaminophen     Tablet .. 1000 milliGRAM(s) Oral every 8 hours  hydrocortisone 5 milliGRAM(s) Oral <User Schedule>  hydrocortisone 10 milliGRAM(s) Oral <User Schedule>  levothyroxine 88 MICROGram(s) Oral daily  lidocaine   4% Patch 1 Patch Transdermal daily  sodium chloride 0.9%. 1000 milliLiter(s) (125 mL/Hr) IV Continuous <Continuous>  tenofovir disoproxil fumarate (VIREAD) 300 milliGRAM(s) Oral daily    MEDICATIONS  (PRN):  melatonin 3 milliGRAM(s) Oral at bedtime PRN Insomnia  methocarbamol 500 milliGRAM(s) Oral every 8 hours PRN Muscle Spasm  oxyCODONE    IR 5 milliGRAM(s) Oral every 4 hours PRN Severe Pain (7 - 10)      Physical Examination  Vital Signs Last 24 Hrs  T(C): 36.8 (14 Nov 2024 15:29), Max: 37.1 (14 Nov 2024 05:23)  T(F): 98.3 (14 Nov 2024 15:29), Max: 98.7 (14 Nov 2024 05:23)  HR: 78 (14 Nov 2024 15:29) (73 - 85)  BP: 104/68 (14 Nov 2024 15:29) (101/65 - 124/77)  BP(mean): 75 (14 Nov 2024 05:23) (75 - 75)  RR: 17 (14 Nov 2024 15:29) (17 - 18)  SpO2: 95% (14 Nov 2024 15:29) (95% - 98%)    Parameters below as of 14 Nov 2024 15:29  Patient On (Oxygen Delivery Method): room air      Constitutional: No acute distress, ill- appearing, no anxious appearing, hyperkinetic, no diaphoretic  HEENT: Moist mucous membranes  Neck:  No JVD, bruits or thyromegaly, No thyroid nodules palpable, no LAD  Respiratory:  Respiratory effort normal, lungs clear to ausculation, without rales or rhonchi  Cardiovascular:  Regular heart rate, normal S1 and S2 sounds, without murmur, rub or gallop.  Gastrointestinal: Soft, non tender without hepatosplenomegaly and masses, no abdominal obesity  Extremities: Sensation intact to monofilament in feet, no cyanosis, clubbing or edema, positive pedal pulses  Neurological:  Oriented to person, place and time, No gross sensory or motor defects, visual fields intact to confrontation, normal deep tendon reflexes    Labs:                        12.8   18.70 )-----------( 144      ( 14 Nov 2024 05:25 )             38.0     11-14    138  |  109[H]  |  19[H]  ----------------------------<  101[H]  4.2   |  25  |  1.34[H]    Ca    9.1      14 Nov 2024 05:25  Mg     2.4     11-13    TPro  6.6  /  Alb  3.8  /  TBili  0.7  /  DBili  x   /  AST  19  /  ALT  20  /  AlkPhos  69  11-14        PT/INR - ( 13 Nov 2024 15:56 )   PT: 10.6 sec;   INR: 0.91 ratio         PTT - ( 13 Nov 2024 15:56 )  PTT:32.7 sec  Urinalysis Basic - ( 14 Nov 2024 05:25 )    Color: x / Appearance: x / SG: x / pH: x  Gluc: 101 mg/dL / Ketone: x  / Bili: x / Urobili: x   Blood: x / Protein: x / Nitrite: x   Leuk Esterase: x / RBC: x / WBC x   Sq Epi: x / Non Sq Epi: x / Bacteria: x        Assessment and Plan:     Endocrine initial consult note   64 year old  Male who presents with a chief complaint of Syncope (14 Nov 2024 13:00)    HPI:   64-year-old male with 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 Wife at bedside also provided collateral information. Patient was feeling well all morning, and in the afternoon at 1 pm when walking to the bathroom he suddenly felt flushed and dizzy, had blurry vision and syncopized on the floor.  Patient does not remember very well regarding how he ended up on the floor or for how long he was on the floor.  When he woke up, he called his family who arrived to help him.  By the time his family came patient was able to stand and his dizziness improved.  After the fall patient states he felt diaphoretic  and was mildly confused. When he fell, patient hit his back on the floor. He denies any LOC, head trauma. Patient has a history of migraines but states they usually do not present this way or this suddenly and acutely.  In the ED, patient initially complained of sharp 10 out of 10 mid- lower back pain that is exacerbated by even minimal movement.  He is able to ambulate and move all extremities with no issues.  Denies any saddle anesthesia,  fecal or urinary incontinence, tingling in his lower extremities or radicular pain.  patient denies any symptoms of recent illness, nausea, vomiting, diarrhea, or fever.  Patient has previously had a similar episode occur 4 years ago in 2020.  At this time he came to the hospital and was found to have a pituitary adenoma for which he regularly follows with endocrinology Dr. Jenkins outpatient.  Patient is currently taking daily hydrocortisone and levothyroxine. On my exam in ED, patient endorses lower back pain that is worse when he moves.  His symptoms of dizziness have since resolved.  Patient admitted to medicine for syncope workup and acute L1 fracture. (13 Nov 2024 21:41). 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 Hx also taken from chart record. 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 His hormonal profile during hospitalization was   prolactin 13.6 (without dilution)  TSH 1.56  FT4 0.4  TT4 4.4  Testosterone <2.5  IGF1 51  FSH 0.7  LH < 0.3     Patient was started on hydrocortisone 10 mg AM and 5 mg in PM, levothyroxine 50 mcg daily at that time. Patient saw his endocrinologist Dr. Knight in May 2024 for follow up. The levothyroxine dose was increased to 88 mcg daily and hydrocortisone 15 mg daily was continued. Patient was advised to follow up closely with urology for testosterone supplementation and neurosurgery for possible transsphenoidal transnasal resection, however patient did not follow up yet with urology and neurosurgery. He reports compliance with hydrocortisone, but sometimes forgets to take his other medications including levothyroxine. Patient reports headache intermittently. He states he has seen ophthalmologist a year ago and was told that his vision is wnl. He denies blurred vision or loss of peripheral vision. States he was going to bathroom and then suddenly black out and had room spinning sensation and then lost consciousness.     Review of systems:  Constitutional: No fever, yes weight loss, yes fatigue, low energy, generalized weakness, poor appetite  Cardiovascular/ Respiratory: No palpitations, no chest pain, no shortness of breath,  no cough, no leg/ ankle swelling  Gastrointestinal: No abdominal pain, no bloating, no nausea, no vomiting, no constipation, no diarrhea, no frequent bowel movements  Neurological: No headaches, no change in vision, no dizziness/ lightheadedness at this time, no tremors, no numbness/ tingling in feet  Endocrine: No Frequent urination, excessive urination, excessive thirst, symptoms     Past Medical and Surgical Hx:  Hypercholesterolemia  CLL (chronic lymphocytic leukemia)  Pituitary adenoma  Hepatitis B  S/P appendectomy    Family Hx:  Denies any family hx of pituitary tumors    Social History:  Tobacco: Yes smoker  Alcohol: Denies  illicit drug abuse:  Denies    Medications  (Standing):  acetaminophen     Tablet .. 1000 milliGRAM(s) Oral every 8 hours  hydrocortisone 5 milliGRAM(s) Oral <User Schedule>  hydrocortisone 10 milliGRAM(s) Oral <User Schedule>  levothyroxine 88 MICROGram(s) Oral daily  lidocaine   4% Patch 1 Patch Transdermal daily  sodium chloride 0.9%. 1000 milliLiter(s) (125 mL/Hr) IV Continuous <Continuous>  tenofovir disoproxil fumarate (VIREAD) 300 milliGRAM(s) Oral daily    Medications  (PRN):  melatonin 3 milliGRAM(s) Oral at bedtime PRN Insomnia  methocarbamol 500 milliGRAM(s) Oral every 8 hours PRN Muscle Spasm  oxyCODONE    IR 5 milliGRAM(s) Oral every 4 hours PRN Severe Pain (7 - 10)      Physical Examination  Vital Signs Last 24 Hrs  T(C): 36.8 (14 Nov 2024 15:29), Max: 37.1 (14 Nov 2024 05:23)  T(F): 98.3 (14 Nov 2024 15:29), Max: 98.7 (14 Nov 2024 05:23)  HR: 78 (14 Nov 2024 15:29) (73 - 85)  BP: 104/68 (14 Nov 2024 15:29) (101/65 - 124/77)  BP(mean): 75 (14 Nov 2024 05:23) (75 - 75)  RR: 17 (14 Nov 2024 15:29) (17 - 18)  SpO2: 95% (14 Nov 2024 15:29) (95% - 98%)    Parameters below as of 14 Nov 2024 15:29  Patient On (Oxygen Delivery Method): room air    Constitutional: No acute distress, yes ill- appearing,  HEENT: Moist mucous membranes, peripheral vision mildly impaired  Neck:  No JVD, bruits or thyromegaly, No thyroid nodules palpable, no LAD, j  Gastrointestinal: Soft, non tender without hepatosplenomegaly and masses, no abdominal obesity  Extremities: no cyanosis, clubbing or edema, positive pedal pulses  Neurological:  Oriented to person, place and time    Labs:                  12.8   18.70 )-----------( 144      ( 14 Nov 2024 05:25 )             38.0   11-14  138  |  109[H]  |  19[H]  ----------------------------<  101[H]  4.2   |  25  |  1.34[H]  Ca    9.1      14 Nov 2024 05:25  Mg     2.4     11-13  Cortisol AM, Serum: 1.0 ug/dL (11.14.24 @ 05:25)  Free Thyroxine, Serum: 0.4 ng/dL (03.21.20 @ 11:12)    MRI Brain:  Procedure date:  03/19/2020    Interpreteration:  MRI brain with and without contrast  Comparison CT performed 2 days prior History mass  Contrast Gadavist    There is trace scattered chronic microvascular ischemic change in the subcortical white matter without mass effect, cortical edema or hydrocephalus. There is no evidence of acute infarct or previous parenchymal hemorrhage. There is a developmental venous anomaly in the right temporal lobe. There is an enhancing mass filling and expanding the sella turcica. It measures 1.5 cm and caudad dimension, 1.2 cm in AP dimension and 1.5 cm transversely. It encroaches on and elevates the optic chiasm. The orbital contents and cerebellar tonsils are within normal limits.    Impression:  Sellar mass consistent with an adenoma as above      Assessment and Plan:  64-year-old male with history of  pituitary adenoma, central hypothyroidism, hyperprolactinemia, secondary adrenal insufficiency, secondary hypogonadism, vitamin D insufficiency, hyperlipidemia, vestibular migraine, prediabetes, CLL on oral chemotherapy, hepatitis B on Tenofovir, who presents to the ED after a fall post syncope. Admitted for syncope and Acute L1 fracture.    1) Pituitary macroadenoma  Patient has multiple pituitary hormones deficiency due to pituitary macroadenoma with mild compression of optic chiasm  Does not have arginine vasopressin deficiency at this time  Per endocrinology note in May 2024, the MRI brain was done in March 2024 and pituitary gland was stable in size, however the imaging is available only of MRI in 2020 on PACS.   Recommend to repeat MRI brain with pituitary protocol to rule out pituitary apoplexy as a cause of his syncope.    1A) Central adrenal insufficiency:  Patient's BP and electrolytes are wnl  Continue 10 mg of hydrocortisone in AM and 5 mg of hydrocortisone  If the SBP decrease to <80 recommend to start stress dose IV hydrocortisone of 50 mg  q 12 hr.  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 Wife at bedside also provided collateral information. Patient was feeling well all morning, and in the afternoon at 1 pm when walking to the bathroom he suddenly felt flushed and dizzy, had blurry vision and syncopized on the floor.  Patient does not remember very well regarding how he ended up on the floor or for how long he was on the floor.  When he woke up, he called his family who arrived to help him.  By the time his family came patient was able to stand and his dizziness improved.  After the fall patient states he felt diaphoretic  and was mildly confused. When he fell, patient hit his back on the floor. He denies any LOC, head trauma. Patient has a history of migraines but states they usually do not present this way or this suddenly and acutely.  In the ED, patient initially complained of sharp 10 out of 10 mid- lower back pain that is exacerbated by even minimal movement.  He is able to ambulate and move all extremities with no issues.  Denies any saddle anesthesia,  fecal or urinary incontinence, tingling in his lower extremities or radicular pain.  patient denies any symptoms of recent illness, nausea, vomiting, diarrhea, or fever.  Patient has previously had a similar episode occur 4 years ago in 2020.  At this time he came to the hospital and was found to have a pituitary adenoma for which he regularly follows with endocrinology Dr. Jenkins outpatient.  Patient is currently taking daily hydrocortisone and levothyroxine. On my exam in ED, patient endorses lower back pain that is worse when he moves.  His symptoms of dizziness have since resolved.  Patient admitted to medicine for syncope workup and acute L1 fracture. (13 Nov 2024 21:41). 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 Hx also taken from chart record. 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 His hormonal profile during hospitalization was   prolactin 13.6 (without dilution)  TSH 1.56  FT4 0.4  TT4 4.4  Testosterone <2.5  IGF1 51  FSH 0.7  LH < 0.3     Patient was started on hydrocortisone 10 mg AM and 5 mg in PM, levothyroxine 50 mcg daily at that time. Patient saw his endocrinologist Dr. Knight in May 2024 for follow up. The levothyroxine dose was increased to 88 mcg daily and hydrocortisone 15 mg daily was continued. Patient was advised to follow up closely with urology for testosterone supplementation and neurosurgery for possible transsphenoidal transnasal resection, however patient did not follow up yet with urology and neurosurgery. He reports compliance with hydrocortisone, but sometimes forgets to take his other medications including levothyroxine. Patient reports headache intermittently. He states he has seen ophthalmologist a year ago and was told that his vision is wnl. He denies blurred vision or loss of peripheral vision. States he was going to bathroom and then suddenly black out and had room spinning sensation and then lost consciousness.     Review of systems:  Constitutional: No fever, yes weight loss, yes fatigue, low energy, generalized weakness, poor appetite  Cardiovascular/ Respiratory: No palpitations, no chest pain, no shortness of breath,  no cough, no leg/ ankle swelling  Gastrointestinal: No abdominal pain, no bloating, no nausea, no vomiting, no constipation, no diarrhea, no frequent bowel movements  Neurological: No headaches, no change in vision, no dizziness/ lightheadedness at this time, no tremors, no numbness/ tingling in feet  Endocrine: No Frequent urination, excessive urination, excessive thirst, symptoms     Past Medical and Surgical Hx:  Hypercholesterolemia  CLL (chronic lymphocytic leukemia)  Pituitary adenoma  Hepatitis B  S/P appendectomy    Family Hx:  Denies any family hx of pituitary tumors    Social History:  Tobacco: Yes smoker  Alcohol: Denies  illicit drug abuse:  Denies    Medications  (Standing):  acetaminophen     Tablet .. 1000 milliGRAM(s) Oral every 8 hours  hydrocortisone 5 milliGRAM(s) Oral <User Schedule>  hydrocortisone 10 milliGRAM(s) Oral <User Schedule>  levothyroxine 88 MICROGram(s) Oral daily  lidocaine   4% Patch 1 Patch Transdermal daily  sodium chloride 0.9%. 1000 milliLiter(s) (125 mL/Hr) IV Continuous <Continuous>  tenofovir disoproxil fumarate (VIREAD) 300 milliGRAM(s) Oral daily    Medications  (PRN):  melatonin 3 milliGRAM(s) Oral at bedtime PRN Insomnia  methocarbamol 500 milliGRAM(s) Oral every 8 hours PRN Muscle Spasm  oxyCODONE    IR 5 milliGRAM(s) Oral every 4 hours PRN Severe Pain (7 - 10)      Physical Examination  Vital Signs Last 24 Hrs  T(C): 36.8 (14 Nov 2024 15:29), Max: 37.1 (14 Nov 2024 05:23)  T(F): 98.3 (14 Nov 2024 15:29), Max: 98.7 (14 Nov 2024 05:23)  HR: 78 (14 Nov 2024 15:29) (73 - 85)  BP: 104/68 (14 Nov 2024 15:29) (101/65 - 124/77)  BP(mean): 75 (14 Nov 2024 05:23) (75 - 75)  RR: 17 (14 Nov 2024 15:29) (17 - 18)  SpO2: 95% (14 Nov 2024 15:29) (95% - 98%)    Parameters below as of 14 Nov 2024 15:29  Patient On (Oxygen Delivery Method): room air    Constitutional: No acute distress, yes ill- appearing,  HEENT: Moist mucous membranes, peripheral vision mildly impaired  Neck:  No JVD, bruits or thyromegaly, No thyroid nodules palpable, no LAD, j  Gastrointestinal: Soft, non tender without hepatosplenomegaly and masses, no abdominal obesity  Extremities: no cyanosis, clubbing or edema, positive pedal pulses  Neurological:  Oriented to person, place and time    Labs:                  12.8   18.70 )-----------( 144      ( 14 Nov 2024 05:25 )             38.0   11-14  138  |  109[H]  |  19[H]  ----------------------------<  101[H]  4.2   |  25  |  1.34[H]  Ca    9.1      14 Nov 2024 05:25  Mg     2.4     11-13  Cortisol AM, Serum: 1.0 ug/dL (11.14.24 @ 05:25)  Free Thyroxine, Serum: 0.4 ng/dL (03.21.20 @ 11:12)    MRI Brain:  Procedure date:  03/19/2020    Interpreteration:  MRI brain with and without contrast  Comparison CT performed 2 days prior History mass  Contrast Gadavist    There is trace scattered chronic microvascular ischemic change in the subcortical white matter without mass effect, cortical edema or hydrocephalus. There is no evidence of acute infarct or previous parenchymal hemorrhage. There is a developmental venous anomaly in the right temporal lobe. There is an enhancing mass filling and expanding the sella turcica. It measures 1.5 cm and caudad dimension, 1.2 cm in AP dimension and 1.5 cm transversely. It encroaches on and elevates the optic chiasm. The orbital contents and cerebellar tonsils are within normal limits.    Impression:  Sellar mass consistent with an adenoma as above      Assessment and Plan:  64-year-old male with history of  pituitary adenoma, central hypothyroidism, hyperprolactinemia, secondary adrenal insufficiency, secondary hypogonadism, vitamin D insufficiency, hyperlipidemia, vestibular migraine, prediabetes, CLL on oral chemotherapy, hepatitis B on Tenofovir, who presents to the ED after a fall post syncope. Admitted for syncope and Acute L1 fracture.    1) Pituitary macroadenoma  Patient has multiple pituitary hormones deficiency due to pituitary macroadenoma with mild compression of optic chiasm  Does not have arginine vasopressin deficiency at this time  Per endocrinology note in May 2024, the MRI brain was done in March 2024 and pituitary gland was stable in size, however the imaging is available only of MRI in 2020 on PACS.   Recommend to repeat MRI brain with pituitary protocol to rule out pituitary apoplexy as a cause of his syncope.    1A) Central adrenal insufficiency:  Patient's BP and electrolytes are wnl  Continue 10 mg of hydrocortisone in AM and 5 mg of hydrocortisone  If the SBP decrease to <80 in hospital,  recommend to start stress dose IV hydrocortisone of 50 mg  q 12 hr.  AM cortisol is iatrogenically low as patient is on hydrocortisone    1B) Central hypothyroidism:  Check free T4. (TSH is not accurate in central hypothyroidism).  Continue levothyroxine 88 mcg in the empty stomach in the morning 1 hour before food and other medications    1C) Central hypogonadism:  Patient has not started testosterone injection yet. Has to follow up with urology to start testosterone injections  His total testosterone level in may 2024 was <4 and free testosterone levels was 0.  Counselled extensively to follow up with urology outpatient  to start testosterone injections heron for his bone health in setting of his Lumbar spine fracture    Counselled wife and patient to follow up with neurosurgery once he is discharged from the hospital. Discussed to take extra hydrocortisone doses on sick days.       Discussed endocrine plan of care with patient and primary team

## 2024-11-14 NOTE — CONSULT NOTE ADULT - ASSESSMENT
Search Terms: Sergio Leija, 1960Search Date: 11/14/2024 09:42:03 AM  The Drug Utilization Report below displays all of the controlled substance prescriptions, if any, that your patient has filled in the last twelve months. The information displayed on this report is compiled from pharmacy submissions to the Department, and accurately reflects the information as submitted by the pharmacies.    This report was requested by: Hansa Johnson | Reference #: [id number]    There are no results for the search terms that you entered.

## 2024-11-14 NOTE — PROGRESS NOTE ADULT - SUBJECTIVE AND OBJECTIVE BOX
Patient is a 64y old  Male who presents with a chief complaint of Syncope (14 Nov 2024 15:44)    PATIENT IS SEEN AND EXAMINED IN MEDICAL FLOOR.  CHOLOT [    ]    BAUTISTA [   ]      GT [   ]    ALLERGIES:  No Known Allergies      Daily     Daily     VITALS:    Vital Signs Last 24 Hrs  T(C): 36.9 (14 Nov 2024 17:51), Max: 37.1 (14 Nov 2024 05:23)  T(F): 98.4 (14 Nov 2024 17:51), Max: 98.7 (14 Nov 2024 05:23)  HR: 75 (14 Nov 2024 17:51) (71 - 85)  BP: 99/57 (14 Nov 2024 17:51) (99/57 - 110/68)  BP(mean): 82 (14 Nov 2024 16:09) (75 - 82)  RR: 18 (14 Nov 2024 17:51) (17 - 18)  SpO2: 97% (14 Nov 2024 17:51) (95% - 97%)    Parameters below as of 14 Nov 2024 17:51  Patient On (Oxygen Delivery Method): room air        LABS:    CBC Full  -  ( 14 Nov 2024 05:25 )  WBC Count : 18.70 K/uL  RBC Count : 4.52 M/uL  Hemoglobin : 12.8 g/dL  Hematocrit : 38.0 %  Platelet Count - Automated : 144 K/uL  Mean Cell Volume : 84.1 fl  Mean Cell Hemoglobin : 28.3 pg  Mean Cell Hemoglobin Concentration : 33.7 g/dL  Auto Neutrophil # : 4.62 K/uL  Auto Lymphocyte # : 13.13 K/uL  Auto Monocyte # : 0.81 K/uL  Auto Eosinophil # : 0.06 K/uL  Auto Basophil # : 0.04 K/uL  Auto Neutrophil % : 24.8 %  Auto Lymphocyte % : 70.2 %  Auto Monocyte % : 4.3 %  Auto Eosinophil % : 0.3 %  Auto Basophil % : 0.2 %    PT/INR - ( 13 Nov 2024 15:56 )   PT: 10.6 sec;   INR: 0.91 ratio         PTT - ( 13 Nov 2024 15:56 )  PTT:32.7 sec  11-14    138  |  109[H]  |  19[H]  ----------------------------<  101[H]  4.2   |  25  |  1.34[H]    Ca    9.1      14 Nov 2024 05:25  Mg     2.4     11-13    TPro  6.6  /  Alb  3.8  /  TBili  0.7  /  DBili  x   /  AST  19  /  ALT  20  /  AlkPhos  69  11-14    CAPILLARY BLOOD GLUCOSE            LIVER FUNCTIONS - ( 14 Nov 2024 05:25 )  Alb: 3.8 g/dL / Pro: 6.6 g/dL / ALK PHOS: 69 U/L / ALT: 20 U/L DA / AST: 19 U/L / GGT: x           Creatinine Trend: 1.34<--, 1.37<--  I&O's Summary              MEDICATIONS:    MEDICATIONS  (STANDING):  acetaminophen     Tablet .. 1000 milliGRAM(s) Oral every 8 hours  hydrocortisone 5 milliGRAM(s) Oral <User Schedule>  hydrocortisone 10 milliGRAM(s) Oral <User Schedule>  influenza   Vaccine 0.5 milliLiter(s) IntraMuscular once  levothyroxine 88 MICROGram(s) Oral daily  lidocaine   4% Patch 1 Patch Transdermal daily  sodium chloride 0.9%. 1000 milliLiter(s) (125 mL/Hr) IV Continuous <Continuous>  tenofovir disoproxil fumarate (VIREAD) 300 milliGRAM(s) Oral daily      MEDICATIONS  (PRN):  melatonin 3 milliGRAM(s) Oral at bedtime PRN Insomnia  methocarbamol 500 milliGRAM(s) Oral every 8 hours PRN Muscle Spasm  oxyCODONE    IR 5 milliGRAM(s) Oral every 4 hours PRN Severe Pain (7 - 10)      REVIEW OF SYSTEMS:                           ALL ROS DONE [ X   ]    CONSTITUTIONAL:  LETHARGIC [   ], FEVER [   ], UNRESPONSIVE [   ]  CVS:  CP  [   ], SOB, [   ], PALPITATIONS [   ], DIZZYNESS [   ]  RS: COUGH [   ], SPUTUM [   ]  GI: ABDOMINAL PAIN [   ], NAUSEA [   ], VOMITINGS [   ], DIARRHEA [   ], CONSTIPATION [   ]  :  DYSURIA [   ], NOCTURIA [   ], INCREASED FREQUENCY [   ], DRIBLING [   ],  SKELETAL: PAINFUL JOINTS [   ], SWOLLEN JOINTS [   ], NECK ACHE [   ], LOW BACK ACHE [   ],  SKIN : ULCERS [   ], RASH [   ], ITCHING [   ]  CNS: HEAD ACHE [   ], DOUBLE VISION [   ], BLURRED VISION [   ], AMS / CONFUSION [   ], SEIZURES [   ], WEAKNESS [   ],TINGLING / NUMBNESS [   ]    PHYSICAL EXAMINATION:  GENERAL APPEARANCE: NO DISTRESS  HEENT:  NO PALLOR, NO  JVD,  NO   NODES, NECK SUPPLE  CVS: S1 +, S2 +,   RS: AEEB,  OCCASIONAL  RALES +,   NO RONCHI  ABD: SOFT, NT, NO, BS +  EXT: NO PE  SKIN: WARM,   SKELETAL:  ROM ACCEPTABLE  CNS:  AAO X    ,   DEFICITS    RADIOLOGY :      ASSESSMENT :     Syncope and collapse    Hypercholesterolemia    HTN (hypertension)    CLL (chronic lymphocytic leukemia)    Pituitary adenoma    Hepatitis B    S/P appendectomy        PLAN:  HPI:  Pt is a 64-year-old male with 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 Wife at bedside also provided collateral information. Patient was feeling well all morning, and in the afternoon at 1 pm when walking to the bathroom he suddenly felt flushed and dizzy, had blurry vision and syncopized on the floor.  Patient does not remember very well regarding how he ended up on the floor or for how long he was on the floor.  When he woke up, he called his family who arrived to help him.  By the time his family came patient was able to stand and his dizziness improved.  After the fall patient states he felt diaphoretic  and was mildly confused. When he fell, patient hit his back on the floor. He denies any LOC, head trauma. Patient has a history of migraines but states they usually do not present this way or this suddenly and acutely.  In the ED, patient initially complained of sharp 10 out of 10 mid- lower back pain that is exacerbated by even minimal movement.  He is able to ambulate and move all extremities with no issues.  Denies any saddle anesthesia,  fecal or urinary incontinence, tingling in his lower extremities or radicular pain.  patient denies any symptoms of recent illness, nausea, vomiting, diarrhea, or fever.  Patient has previously had a similar episode occur 4 years ago in 2020.  At this time he came to the hospital and was found to have a pituitary adenoma for which he regularly follows with endocrinology Dr. Jenkins outpatient.  Patient is currently taking daily hydrocortisone and levothyroxine. On my exam in ED, patient endorses lower back pain that is worse when he moves.  His symptoms of dizziness have since resolved.  Patient admitted to medicine for syncope workup and acute L1 fracture.          (13 Nov 2024 21:41)    #  CASE D/W WIFE AT BEDSIDE. ALL QUESTIONS ANSWERED    # SYNCOPE  - TELEMETRY  - F/U ORTHOSTATIC VITALS  - F/U ECHO  - NOTED CT HEAD  - F/U PT EVAL  - CARDIOLOGY CONSULT  - NEUROLOGY CONSULT    # HX OF PANHYPOPITUITARISM [PITUITARY ADENOMA]  # CENTAL HYPOTHYROIDISM  # HYPERPROLACTINEMIA  # SECONDARY ADRENAL INSUFFICIENCY  # SECONDARY HYPOGONADISM  - F/U TSH  - F/U CORTISOL  - LEVOTHYROXINE  - ON HYDROCORTISONE  - ENDOCRINOLOGY CONSULT    # BACK PAIN S/T ACUTE LUMBAR FRACTURE  - NOTED CT L SPINE  - PRN PAIN CONTROL  - F/U PT EVAL  - ORTHOSPINE CONSULT    # REBECCA VS. CKD  - F/U UA  - F/U PVR  - MONITOR CR  - AVOID NEPHROTOXIC AGENTS    # CLL ON ORAL CHEMOTHERAPY    # HEPATITIS B ON TENOFOVIR    # HX OF VESTIBULAR MIGRAINE    # VITAMIN D INSUFFICIENCY  - F/U 25OH VIT D  - SUPPLEMENT    # HYPERLIPIDEMIA  - F/U LIPID PANEL    # PRE-DM  - F/U HBA1C    # SMOKER  - COUNSELLED ON CESSATION > 10 MINS    # GI AND DVT PPX .   Patient is a 64y old  Male who presents with a chief complaint of Syncope (14 Nov 2024 15:44)    PATIENT IS SEEN AND EXAMINED IN MEDICAL FLOOR.      ALLERGIES:  No Known Allergies      VITALS:    Vital Signs Last 24 Hrs  T(C): 36.9 (14 Nov 2024 17:51), Max: 37.1 (14 Nov 2024 05:23)  T(F): 98.4 (14 Nov 2024 17:51), Max: 98.7 (14 Nov 2024 05:23)  HR: 75 (14 Nov 2024 17:51) (71 - 85)  BP: 99/57 (14 Nov 2024 17:51) (99/57 - 110/68)  BP(mean): 82 (14 Nov 2024 16:09) (75 - 82)  RR: 18 (14 Nov 2024 17:51) (17 - 18)  SpO2: 97% (14 Nov 2024 17:51) (95% - 97%)    Parameters below as of 14 Nov 2024 17:51  Patient On (Oxygen Delivery Method): room air        LABS:    CBC Full  -  ( 14 Nov 2024 05:25 )  WBC Count : 18.70 K/uL  RBC Count : 4.52 M/uL  Hemoglobin : 12.8 g/dL  Hematocrit : 38.0 %  Platelet Count - Automated : 144 K/uL  Mean Cell Volume : 84.1 fl  Mean Cell Hemoglobin : 28.3 pg  Mean Cell Hemoglobin Concentration : 33.7 g/dL  Auto Neutrophil # : 4.62 K/uL  Auto Lymphocyte # : 13.13 K/uL  Auto Monocyte # : 0.81 K/uL  Auto Eosinophil # : 0.06 K/uL  Auto Basophil # : 0.04 K/uL  Auto Neutrophil % : 24.8 %  Auto Lymphocyte % : 70.2 %  Auto Monocyte % : 4.3 %  Auto Eosinophil % : 0.3 %  Auto Basophil % : 0.2 %    PT/INR - ( 13 Nov 2024 15:56 )   PT: 10.6 sec;   INR: 0.91 ratio         PTT - ( 13 Nov 2024 15:56 )  PTT:32.7 sec  11-14    138  |  109[H]  |  19[H]  ----------------------------<  101[H]  4.2   |  25  |  1.34[H]    Ca    9.1      14 Nov 2024 05:25  Mg     2.4     11-13    TPro  6.6  /  Alb  3.8  /  TBili  0.7  /  DBili  x   /  AST  19  /  ALT  20  /  AlkPhos  69  11-14    CAPILLARY BLOOD GLUCOSE            LIVER FUNCTIONS - ( 14 Nov 2024 05:25 )  Alb: 3.8 g/dL / Pro: 6.6 g/dL / ALK PHOS: 69 U/L / ALT: 20 U/L DA / AST: 19 U/L / GGT: x           Creatinine Trend: 1.34<--, 1.37<--  I&O's Summary              MEDICATIONS:    MEDICATIONS  (STANDING):  acetaminophen     Tablet .. 1000 milliGRAM(s) Oral every 8 hours  hydrocortisone 5 milliGRAM(s) Oral <User Schedule>  hydrocortisone 10 milliGRAM(s) Oral <User Schedule>  influenza   Vaccine 0.5 milliLiter(s) IntraMuscular once  levothyroxine 88 MICROGram(s) Oral daily  lidocaine   4% Patch 1 Patch Transdermal daily  sodium chloride 0.9%. 1000 milliLiter(s) (125 mL/Hr) IV Continuous <Continuous>  tenofovir disoproxil fumarate (VIREAD) 300 milliGRAM(s) Oral daily      MEDICATIONS  (PRN):  melatonin 3 milliGRAM(s) Oral at bedtime PRN Insomnia  methocarbamol 500 milliGRAM(s) Oral every 8 hours PRN Muscle Spasm  oxyCODONE    IR 5 milliGRAM(s) Oral every 4 hours PRN Severe Pain (7 - 10)      REVIEW OF SYSTEMS:                           ALL ROS DONE [ X   ]    CONSTITUTIONAL:  LETHARGIC [   ], FEVER [   ], UNRESPONSIVE [   ]  CVS:  CP  [   ], SOB, [   ], PALPITATIONS [   ], DIZZYNESS [   ]  RS: COUGH [   ], SPUTUM [   ]  GI: ABDOMINAL PAIN [   ], NAUSEA [   ], VOMITINGS [   ], DIARRHEA [   ], CONSTIPATION [   ]  :  DYSURIA [   ], NOCTURIA [   ], INCREASED FREQUENCY [   ], DRIBLING [   ],  SKELETAL: PAINFUL JOINTS [   ], SWOLLEN JOINTS [   ], NECK ACHE [   ], LOW BACK ACHE [   ],  SKIN : ULCERS [   ], RASH [   ], ITCHING [   ]  CNS: HEAD ACHE [   ], DOUBLE VISION [   ], BLURRED VISION [   ], AMS / CONFUSION [   ], SEIZURES [   ], WEAKNESS [   ],TINGLING / NUMBNESS [   ]    PHYSICAL EXAMINATION:  GENERAL APPEARANCE: NO DISTRESS  HEENT:  NO PALLOR, NO  JVD,  NO   NODES, NECK SUPPLE  CVS: S1 +, S2 +,   RS: AEEB,  OCCASIONAL  RALES +,   NO RONCHI  ABD: SOFT, NT, NO, BS +  EXT: NO PE  SKIN: WARM,   SKELETAL:  ROM ACCEPTABLE  CNS:  AAO X    ,   DEFICITS    RADIOLOGY :      ASSESSMENT :     Syncope and collapse    Hypercholesterolemia    HTN (hypertension)    CLL (chronic lymphocytic leukemia)    Pituitary adenoma    Hepatitis B    S/P appendectomy        PLAN:  HPI:  Pt is a 64-year-old male with 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 Wife at bedside also provided collateral information. Patient was feeling well all morning, and in the afternoon at 1 pm when walking to the bathroom he suddenly felt flushed and dizzy, had blurry vision and syncopized on the floor.  Patient does not remember very well regarding how he ended up on the floor or for how long he was on the floor.  When he woke up, he called his family who arrived to help him.  By the time his family came patient was able to stand and his dizziness improved.  After the fall patient states he felt diaphoretic  and was mildly confused. When he fell, patient hit his back on the floor. He denies any LOC, head trauma. Patient has a history of migraines but states they usually do not present this way or this suddenly and acutely.  In the ED, patient initially complained of sharp 10 out of 10 mid- lower back pain that is exacerbated by even minimal movement.  He is able to ambulate and move all extremities with no issues.  Denies any saddle anesthesia,  fecal or urinary incontinence, tingling in his lower extremities or radicular pain.  patient denies any symptoms of recent illness, nausea, vomiting, diarrhea, or fever.  Patient has previously had a similar episode occur 4 years ago in 2020.  At this time he came to the hospital and was found to have a pituitary adenoma for which he regularly follows with endocrinology Dr. Jenkins outpatient.  Patient is currently taking daily hydrocortisone and levothyroxine. On my exam in ED, patient endorses lower back pain that is worse when he moves.  His symptoms of dizziness have since resolved.  Patient admitted to medicine for syncope workup and acute L1 fracture.          (13 Nov 2024 21:41)    #  CASE D/W WIFE AT BEDSIDE. ALL QUESTIONS ANSWERED    # SYNCOPE  - TELEMETRY  - F/U ORTHOSTATIC VITALS  - F/U ECHO  - NOTED CT HEAD  - F/U PT EVAL  - F/U MRI PITUITARY  - CARDIOLOGY CONSULT  - NEUROLOGY CONSULT    # HX OF PANHYPOPITUITARISM [PITUITARY ADENOMA]  # CENTAL HYPOTHYROIDISM  # HYPERPROLACTINEMIA  # SECONDARY ADRENAL INSUFFICIENCY  # SECONDARY HYPOGONADISM  - F/U TSH  - F/U CORTISOL  - LEVOTHYROXINE  - ON HYDROCORTISONE  - F/U MRI PITUITARY  - ENDOCRINOLOGY CONSULT    # BACK PAIN S/T ACUTE LUMBAR FRACTURE  - NOTED CT L SPINE  - PRN PAIN CONTROL  - F/U PT EVAL  - ORTHOSPINE CONSULT    # REBECCA VS. CKD  - F/U UA  - F/U PVR  - MONITOR CR  - AVOID NEPHROTOXIC AGENTS    # CLL ON ORAL CHEMOTHERAPY    # HEPATITIS B ON TENOFOVIR    # HX OF VESTIBULAR MIGRAINE    # VITAMIN D INSUFFICIENCY  - F/U 25OH VIT D  - SUPPLEMENT    # HYPERLIPIDEMIA  - F/U LIPID PANEL    # PRE-DM  - F/U HBA1C    # SMOKER  - COUNSELLED ON CESSATION > 10 MINS    # GI AND DVT PPX .   Patient is a 64y old  Male who presents with a chief complaint of Syncope (14 Nov 2024 15:44)    PATIENT IS SEEN AND EXAMINED IN MEDICAL FLOOR.      ALLERGIES:  No Known Allergies      VITALS:    Vital Signs Last 24 Hrs  T(C): 36.9 (14 Nov 2024 17:51), Max: 37.1 (14 Nov 2024 05:23)  T(F): 98.4 (14 Nov 2024 17:51), Max: 98.7 (14 Nov 2024 05:23)  HR: 75 (14 Nov 2024 17:51) (71 - 85)  BP: 99/57 (14 Nov 2024 17:51) (99/57 - 110/68)  BP(mean): 82 (14 Nov 2024 16:09) (75 - 82)  RR: 18 (14 Nov 2024 17:51) (17 - 18)  SpO2: 97% (14 Nov 2024 17:51) (95% - 97%)    Parameters below as of 14 Nov 2024 17:51  Patient On (Oxygen Delivery Method): room air        LABS:    CBC Full  -  ( 14 Nov 2024 05:25 )  WBC Count : 18.70 K/uL  RBC Count : 4.52 M/uL  Hemoglobin : 12.8 g/dL  Hematocrit : 38.0 %  Platelet Count - Automated : 144 K/uL  Mean Cell Volume : 84.1 fl  Mean Cell Hemoglobin : 28.3 pg  Mean Cell Hemoglobin Concentration : 33.7 g/dL  Auto Neutrophil # : 4.62 K/uL  Auto Lymphocyte # : 13.13 K/uL  Auto Monocyte # : 0.81 K/uL  Auto Eosinophil # : 0.06 K/uL  Auto Basophil # : 0.04 K/uL  Auto Neutrophil % : 24.8 %  Auto Lymphocyte % : 70.2 %  Auto Monocyte % : 4.3 %  Auto Eosinophil % : 0.3 %  Auto Basophil % : 0.2 %    PT/INR - ( 13 Nov 2024 15:56 )   PT: 10.6 sec;   INR: 0.91 ratio         PTT - ( 13 Nov 2024 15:56 )  PTT:32.7 sec  11-14    138  |  109[H]  |  19[H]  ----------------------------<  101[H]  4.2   |  25  |  1.34[H]    Ca    9.1      14 Nov 2024 05:25  Mg     2.4     11-13    TPro  6.6  /  Alb  3.8  /  TBili  0.7  /  DBili  x   /  AST  19  /  ALT  20  /  AlkPhos  69  11-14    CAPILLARY BLOOD GLUCOSE            LIVER FUNCTIONS - ( 14 Nov 2024 05:25 )  Alb: 3.8 g/dL / Pro: 6.6 g/dL / ALK PHOS: 69 U/L / ALT: 20 U/L DA / AST: 19 U/L / GGT: x           Creatinine Trend: 1.34<--, 1.37<--  I&O's Summary              MEDICATIONS:    MEDICATIONS  (STANDING):  acetaminophen     Tablet .. 1000 milliGRAM(s) Oral every 8 hours  hydrocortisone 5 milliGRAM(s) Oral <User Schedule>  hydrocortisone 10 milliGRAM(s) Oral <User Schedule>  influenza   Vaccine 0.5 milliLiter(s) IntraMuscular once  levothyroxine 88 MICROGram(s) Oral daily  lidocaine   4% Patch 1 Patch Transdermal daily  sodium chloride 0.9%. 1000 milliLiter(s) (125 mL/Hr) IV Continuous <Continuous>  tenofovir disoproxil fumarate (VIREAD) 300 milliGRAM(s) Oral daily      MEDICATIONS  (PRN):  melatonin 3 milliGRAM(s) Oral at bedtime PRN Insomnia  methocarbamol 500 milliGRAM(s) Oral every 8 hours PRN Muscle Spasm  oxyCODONE    IR 5 milliGRAM(s) Oral every 4 hours PRN Severe Pain (7 - 10)      REVIEW OF SYSTEMS:                           ALL ROS DONE [ X   ]    CONSTITUTIONAL:  LETHARGIC [   ], FEVER [   ], UNRESPONSIVE [   ]  CVS:  CP  [   ], SOB, [   ], PALPITATIONS [   ], DIZZYNESS [   ]  RS: COUGH [   ], SPUTUM [   ]  GI: ABDOMINAL PAIN [   ], NAUSEA [   ], VOMITINGS [   ], DIARRHEA [   ], CONSTIPATION [   ]  :  DYSURIA [   ], NOCTURIA [   ], INCREASED FREQUENCY [   ], DRIBLING [   ],  SKELETAL: PAINFUL JOINTS [   ], SWOLLEN JOINTS [   ], NECK ACHE [   ], LOW BACK ACHE [   ],  SKIN : ULCERS [   ], RASH [   ], ITCHING [   ]  CNS: HEAD ACHE [   ], DOUBLE VISION [   ], BLURRED VISION [   ], AMS / CONFUSION [   ], SEIZURES [   ], WEAKNESS [   ],TINGLING / NUMBNESS [   ]    PHYSICAL EXAMINATION:  GENERAL APPEARANCE: NO DISTRESS  HEENT:  NO PALLOR, NO  JVD,  NO   NODES, NECK SUPPLE  CVS: S1 +, S2 +,   RS: AEEB,  OCCASIONAL  RALES +,   NO RONCHI  ABD: SOFT, NT, NO, BS +  EXT: NO PE  SKIN: WARM,   SKELETAL:  ROM ACCEPTABLE  CNS:  AAO X    ,   DEFICITS    RADIOLOGY :      ASSESSMENT :     Syncope and collapse    Hypercholesterolemia    HTN (hypertension)    CLL (chronic lymphocytic leukemia)    Pituitary adenoma    Hepatitis B    S/P appendectomy        PLAN:  HPI:  Pt is a 64-year-old male with 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 Wife at bedside also provided collateral information. Patient was feeling well all morning, and in the afternoon at 1 pm when walking to the bathroom he suddenly felt flushed and dizzy, had blurry vision and syncopized on the floor.  Patient does not remember very well regarding how he ended up on the floor or for how long he was on the floor.  When he woke up, he called his family who arrived to help him.  By the time his family came patient was able to stand and his dizziness improved.  After the fall patient states he felt diaphoretic  and was mildly confused. When he fell, patient hit his back on the floor. He denies any LOC, head trauma. Patient has a history of migraines but states they usually do not present this way or this suddenly and acutely.  In the ED, patient initially complained of sharp 10 out of 10 mid- lower back pain that is exacerbated by even minimal movement.  He is able to ambulate and move all extremities with no issues.  Denies any saddle anesthesia,  fecal or urinary incontinence, tingling in his lower extremities or radicular pain.  patient denies any symptoms of recent illness, nausea, vomiting, diarrhea, or fever.  Patient has previously had a similar episode occur 4 years ago in 2020.  At this time he came to the hospital and was found to have a pituitary adenoma for which he regularly follows with endocrinology Dr. Jenkins outpatient.  Patient is currently taking daily hydrocortisone and levothyroxine. On my exam in ED, patient endorses lower back pain that is worse when he moves.  His symptoms of dizziness have since resolved.  Patient admitted to medicine for syncope workup and acute L1 fracture.          (13 Nov 2024 21:41)    #  CASE D/W WIFE AT BEDSIDE. ALL QUESTIONS ANSWERED    # SYNCOPE  - TELEMETRY  - F/U ORTHOSTATIC VITALS  - F/U ECHO  - NOTED CT HEAD  - F/U PT EVAL  - F/U MRI PITUITARY  - CARDIOLOGY CONSULT  - NEUROLOGY CONSULT    # HX OF PANHYPOPITUITARISM [PITUITARY ADENOMA]  # CENTAL HYPOTHYROIDISM  # HYPERPROLACTINEMIA  # SECONDARY ADRENAL INSUFFICIENCY  # SECONDARY HYPOGONADISM  - F/U TSH  - F/U CORTISOL    - LEVOTHYROXINE  - ON HYDROCORTISONE  - F/U MRI PITUITARY  - ENDOCRINOLOGY CONSULT    # BACK PAIN S/T ACUTE LUMBAR FRACTURE  - NOTED CT L SPINE  - PRN PAIN CONTROL  - F/U PT EVAL  - ORTHOSPINE CONSULT    # REBECCA VS. CKD  - F/U UA  - F/U PVR  - MONITOR CR  - AVOID NEPHROTOXIC AGENTS    # CLL ON ORAL CHEMOTHERAPY    # HEPATITIS B ON TENOFOVIR    # HX OF VESTIBULAR MIGRAINE    # VITAMIN D INSUFFICIENCY  - F/U 25OH VIT D  - SUPPLEMENT    # HYPERLIPIDEMIA  - F/U LIPID PANEL    # PRE-DM  - F/U HBA1C    # SMOKER  - COUNSELLED ON CESSATION > 10 MINS    # GI AND DVT PPX .

## 2024-11-14 NOTE — CONSULT NOTE ADULT - SUBJECTIVE AND OBJECTIVE BOX
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 Wife at bedside also provided collateral information. Patient was feeling well all morning, and in the afternoon at 1 pm when walking to the bathroom he suddenly felt flushed and dizzy, had blurry vision and syncopized on the floor.  Patient does not remember very well regarding how he ended up on the floor or for how long he was on the floor.  When he woke up, he called his family who arrived to help him.  By the time his family came patient was able to stand and his dizziness improved.  After the fall patient states he felt diaphoretic  and was mildly confused. When he fell, patient hit his back on the floor. He denies any LOC, head trauma. Patient has a history of migraines but states they usually do not present this way or this suddenly and acutely.  In the ED, patient initially complained of sharp 10 out of 10 mid- lower back pain that is exacerbated by even minimal movement.  He is able to ambulate and move all extremities with no issues.  Denies any saddle anesthesia,  fecal or urinary incontinence, tingling in his lower extremities or radicular pain.  patient denies any symptoms of recent illness, nausea, vomiting, diarrhea, or fever.  Patient has previously had a similar episode occur 4 years ago in 2020.  At this time he came to the hospital and was found to have a pituitary adenoma for which he regularly follows with endocrinology Dr. Jenkins outpatient.  Patient is currently taking daily hydrocortisone and levothyroxine. On my exam in ED, patient endorses lower back pain that is worse when he moves.  His symptoms of dizziness have since resolved.  Patient admitted to medicine for syncope workup and acute L1 fracture.          (13 Nov 2024 21:41)   Patient seen and examined.     PAST MEDICAL & SURGICAL HISTORY:  Hypercholesterolemia      CLL (chronic lymphocytic leukemia)      Pituitary adenoma      Hepatitis B      S/P appendectomy  10 years ago          Allergies    No Known Allergies    Intolerances        MEDICATIONS  (STANDING):  acetaminophen     Tablet .. 1000 milliGRAM(s) Oral every 8 hours  hydrocortisone 5 milliGRAM(s) Oral <User Schedule>  hydrocortisone 10 milliGRAM(s) Oral <User Schedule>  levothyroxine 88 MICROGram(s) Oral daily  lidocaine   4% Patch 1 Patch Transdermal daily  sodium chloride 0.9%. 1000 milliLiter(s) (125 mL/Hr) IV Continuous <Continuous>  tenofovir disoproxil fumarate (VIREAD) 300 milliGRAM(s) Oral daily      MEDICATIONS  (PRN):  melatonin 3 milliGRAM(s) Oral at bedtime PRN Insomnia  methocarbamol 500 milliGRAM(s) Oral every 8 hours PRN Muscle Spasm  oxyCODONE    IR 5 milliGRAM(s) Oral every 4 hours PRN Severe Pain (7 - 10)   Medications up to date at time of exam.    Medications up to date at time of exam.    FAMILY HISTORY:      SOCIAL HISTORY  Smoking History: Used to smoke 1 pack Daily , now 3 cigarettes , last smoke yesterday .   Living Condition: [   ] apartment, [   ] private house  Work History:   Travel History: denies recent travel  Illicit Substance Use: denies  Alcohol Use: denies    REVIEW OF SYSTEMS:    CONSTITUTIONAL:  No fevers, chills. Denies Night sweats.     HEENT:  Denies blurred vision. No sore throat nor runny nose.    CARDIOVASCULAR:  Denies chest discomfort. No palpitations.    RESPIRATORY:  Denies SOB. No cough and no wheezing.    GASTROINTESTINAL:  Denies abdominal pain. No vomiting on exam.     GENITOURINARY: No hematuria.     NEUROLOGIC:  No headache. No tremors. No seizures on exam.     PSYCHIATRIC:  No emotional distress on exam.   PHYSICAL EXAMINATION:    Vital Signs Last 24 Hrs  T(C): 36.7 (14 Nov 2024 11:08), Max: 37.1 (14 Nov 2024 05:23)  T(F): 98.1 (14 Nov 2024 11:08), Max: 98.7 (14 Nov 2024 05:23)  HR: 79 (14 Nov 2024 11:08) (68 - 85)  BP: 107/71 (14 Nov 2024 11:08) (101/65 - 146/83)  BP(mean): 75 (14 Nov 2024 05:23) (75 - 75)  RR: 18 (14 Nov 2024 11:08) (18 - 18)  SpO2: 97% (14 Nov 2024 11:08) (96% - 100%)    Parameters below as of 14 Nov 2024 11:08  Patient On (Oxygen Delivery Method): room air       (if applicable)    General: Alert and oriented. Able to answer question with no SOB. No acute distress.     HEENT: Head is normocephalic and atraumatic. Extraocular muscles are intact. Mucous membranes are moist.     NECK: Supple, no palpable adenopathy.    LUNGS: Clear to auscultation bilaterally with  no wheezing, rales, or rhonchi. No use of accessory muscle.      HEART: S1 S2 Regular rate and no click/ rub.     ABDOMEN: Soft, nontender, and nondistended.  Active bowel sounds.     EXTREMITIES: Without any cyanosis, clubbing, rash, lesions or edema.    NEUROLOGIC: Awake, alert, oriented.     SKIN: Warm, dry, good turgor.      LABS:                        12.8   18.70 )-----------( 144      ( 14 Nov 2024 05:25 )             38.0     11-14    138  |  109[H]  |  19[H]  ----------------------------<  101[H]  4.2   |  25  |  1.34[H]    Ca    9.1      14 Nov 2024 05:25  Mg     2.4     11-13    TPro  6.6  /  Alb  3.8  /  TBili  0.7  /  DBili  x   /  AST  19  /  ALT  20  /  AlkPhos  69  11-14    PT/INR - ( 13 Nov 2024 15:56 )   PT: 10.6 sec;   INR: 0.91 ratio         PTT - ( 13 Nov 2024 15:56 )  PTT:32.7 sec  Urinalysis Basic - ( 14 Nov 2024 05:25 )    Color: x / Appearance: x / SG: x / pH: x  Gluc: 101 mg/dL / Ketone: x  / Bili: x / Urobili: x   Blood: x / Protein: x / Nitrite: x   Leuk Esterase: x / RBC: x / WBC x   Sq Epi: x / Non Sq Epi: x / Bacteria: x            D-Dimer Assay, Quantitative: 213 ng/mL DDU (11-14-24 @ 11:53)    MICROBIOLOGY: (if applicable)    RADIOLOGY & ADDITIONAL STUDIES:  EKG:   CXR:  < from: CT Abdomen and Pelvis w/ IV Cont (11.13.24 @ 18:28) >    ACC: [id number] EXAM:  CT ABDOMEN AND PELVIS IC   ORDERED BY: BLANCA PEÑA     ACC: [id number] EXAM:  CT CHEST IC   ORDERED BY: BLANCA PEÑA     PROCEDURE DATE:  11/13/2024          INTERPRETATION:  CLINICAL INFORMATION: Left mid back pain    COMPARISON: CT chest March 18, 2020, CT abdomen/pelvis March 17, 2020    CONTRAST/COMPLICATIONS:  IV Contrast: Omnipaque 350 (accession [id number]), IV contrast documented   in unlinked concurrent exam (accession [id number])  90 cc administered   10   cc discarded  Oral Contrast: NONE  None reported at time of study completion    PROCEDURE:  CT of the Chest, Abdomen and Pelvis was performed.  Sagittal and coronal reformats were performed.    FINDINGS:  CHEST:  LUNGS AND LARGE AIRWAYS: Patent central airways.Paraseptal emphysema and   biapical scar.  PLEURA: No pleural effusion.  VESSELS: Within normal limits.  HEART: Heart size is normal. No pericardial effusion.  MEDIASTINUM AND TAVO: No lymphadenopathy.  CHEST WALL AND LOWER NECK: Within normal limits.    ABDOMEN AND PELVIS:  LIVER: Subcentimeter hypoattenuating right lobe lesion which is too small   to characterize.  BILE DUCTS: Normal caliber.  GALLBLADDER: Cholelithiasis.  SPLEEN: Mildly enlarged, measuring 13.4 cm.  PANCREAS: Within normal limits.  ADRENALS: Within normal limits.  KIDNEYS/URETERS: Within normal limits.    BLADDER: Within normal limits.  REPRODUCTIVE ORGANS: Prostate within normal limits.    BOWEL: No bowel obstruction. Appendix not visualized. Colonic   diverticulosis without diverticulitis.  PERITONEUM/RETROPERITONEUM: Within normal limits.  VESSELS: Atherosclerotic changes.  LYMPH NODES: No lymphadenopathy.  ABDOMINAL WALL: Within normal limits.  BONES: Acute fracture of the L1 vertebral body with mild compression   deformity of the superior endplate and no retropulsion.    IMPRESSION:  Acute L1 vertebral body fracture as described.        --- End of Report ---            GEETA TOLENTINO MD; Attending Radiologist  This document has been electronically signed. Nov 13 2024  6:45PM    < end of copied text >    < from: CT Chest w/ IV Cont (11.13.24 @ 18:28) >    ACC: [id number] EXAM:  CT ABDOMEN AND PELVIS IC   ORDERED BY: BLANCA PEÑA     ACC: [id number] EXAM:  CT CHEST IC   ORDERED BY: BLANCA PEÑA     PROCEDURE DATE:  11/13/2024          INTERPRETATION:  CLINICAL INFORMATION: Left mid back pain    COMPARISON: CT chest March 18, 2020, CT abdomen/pelvis March 17, 2020    CONTRAST/COMPLICATIONS:  IV Contrast: Omnipaque 350 (accession [id number]), IV contrast documented   in unlinked concurrent exam (accession [id number])  90 cc administered   10   cc discarded  Oral Contrast: NONE  None reported at time of study completion    PROCEDURE:  CT of the Chest, Abdomen and Pelvis was performed.  Sagittal and coronal reformats were performed.    FINDINGS:  CHEST:  LUNGS AND LARGE AIRWAYS: Patent central airways.Paraseptal emphysema and   biapical scar.  PLEURA: No pleural effusion.  VESSELS: Within normal limits.  HEART: Heart size is normal. No pericardial effusion.  MEDIASTINUM AND TAVO: No lymphadenopathy.  CHEST WALL AND LOWER NECK: Within normal limits.    ABDOMEN AND PELVIS:  LIVER: Subcentimeter hypoattenuating right lobe lesion which is too small   to characterize.  BILE DUCTS: Normal caliber.  GALLBLADDER: Cholelithiasis.  SPLEEN: Mildly enlarged, measuring 13.4 cm.  PANCREAS: Within normal limits.  ADRENALS: Within normal limits.  KIDNEYS/URETERS: Within normal limits.    BLADDER: Within normal limits.  REPRODUCTIVE ORGANS: Prostate within normal limits.    BOWEL: No bowel obstruction. Appendix not visualized. Colonic   diverticulosis without diverticulitis.  PERITONEUM/RETROPERITONEUM: Within normal limits.  VESSELS: Atherosclerotic changes.  LYMPH NODES: No lymphadenopathy.  ABDOMINAL WALL: Within normal limits.  BONES: Acute fracture of the L1 vertebral body with mild compression   deformity of the superior endplate and no retropulsion.    IMPRESSION:  Acute L1 vertebral body fracture as described.        --- End of Report ---            GEETA TOLENTINO MD; Attending Radiologist  This document has been electronically signed. Nov 13 2024  6:45PM    < end of copied text >    ECHO:    IMPRESSION: 64y Male PAST MEDICAL & SURGICAL HISTORY:  Hypercholesterolemia      CLL (chronic lymphocytic leukemia)      Pituitary adenoma      Hepatitis B      S/P appendectomy  10 years ago       Impression: This is a 63 Y/O male active smoker .  With CLL on oral chemotherapy, hepatitis B on Tenofovir. Presented to ED after a fall in his home with symptoms of Dizziness which was resolved at this time . Patient endorses lower back pain that is worse when he moves.  His symptoms of dizziness have since resolved.  Admitted to medicine for syncope workup and acute L1 fracture. For pulmonary evaluation due to showing CT Chest with Paraseptal Emphysema . No Pleural Effusion.     Suggestion:  O2 saturation 97% room air. So far saturating good room air.  Reinforced the importance of smoking cessation , not willing to quit, per pt he will think about it but will follow the hospital protocol of no smoking.   Cardiology following for Syncope.  No Lung work up for Emphysema at this time. Can benefit with outpatient PFT .

## 2024-11-14 NOTE — CONSULT NOTE ADULT - PROBLEM SELECTOR RECOMMENDATION 9
Pt with acute low back pain which is somatic in nature due to syncopal episode and fall. CTAP demonstrates  an acute L1 vertebral body fracture. Neurosurgery consult pending.   Opioid pain recommendations   - Start Oxycodone 5 mg PO q 4 hours PRN severe pain. Monitor for sedation/ respiratory depression.   Non-opioid pain recommendations   - Start Robaxin 500mg PO q 8hrs PRN for muscle spasms. (patient does not want to take ATC)  - No NSAIDs due to REBECCA  - Start Acetaminophen 1 gram PO q 8 hours x 4 days. Monitor LFTs  - Start Lidoderm 4% patch daily. (12 hrs on/12 hrs off)  Bowel Regimen  - Continue Miralax 17G PO daily  - Continue Senna 2 tablets at bedtime for constipation  Mild pain (score 1-3)  - Non-pharmacological pain treatment recommendations  - Warm/ Cool packs PRN   - Repositioning extremity, elevation, imagery, relaxation, distraction.  - Physical therapy OOB if no contraindications   Recommendations discussed with primary team and RN

## 2024-11-14 NOTE — CONSULT NOTE ADULT - TIME BILLING
I counseled the primary team about the further testing indicated to help determine contributing factors to the patient's syncopal event.
minutes spent the time noted on the day of this patient encounter preparing for, reviewing records/charts/labs, interview and physical examination, coordination of care with patient and primary team, providing and documenting the above E/M service and 50% of time spent face to face counseling and education provided to patient on disease course, and treatment/management. All questions and concerns were answered and addressed in detail.

## 2024-11-14 NOTE — CONSULT NOTE ADULT - ASSESSMENT
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 Wife at bedside also provided collateral information. Patient was feeling well all morning, and in the afternoon at 1 pm when walking to the bathroom he suddenly felt flushed and dizzy, had blurry vision and syncopized on the floor.  Patient does not remember very well regarding how he ended up on the floor or for how long he was on the floor.  When he woke up, he called his family who arrived to help him.  By the time his family came patient was able to stand and his dizziness improved.  After the fall patient states he felt diaphoretic  and was mildly confused. When he fell, patient hit his back on the floor. He denies any LOC, head trauma. Patient has a history of migraines but states they usually do not present this way or this suddenly and acutely.  In the ED, patient initially complained of sharp 10 out of 10 mid- lower back pain that is exacerbated by even minimal movement.  He is able to ambulate and move all extremities with no issues.  Denies any saddle anesthesia,  fecal or urinary incontinence, tingling in his lower extremities or radicular pain.  patient denies any symptoms of recent illness, nausea, vomiting, diarrhea, or fever.  Patient has previously had a similar episode occur 4 years ago in 2020.  At this time he came to the hospital and was found to have a pituitary adenoma for which he regularly follows with endocrinology Dr. Jenkins outpatient.  Patient is currently taking daily hydrocortisone and levothyroxine. On my exam in ED, patient endorses lower back pain that is worse when he moves.  His symptoms of dizziness have since resolved.  Patient admitted to medicine for syncope workup and acute L1 fracture.          (13 Nov 2024 21:41)      Patient seen and evaluated at bedside. RHD/LHD presents c/o R/L shoulder pain sp mechanical fall. Denies HS/LOC. Denies numbness/tingling. Denies fever/chills. Denies pain/injury elsewhere. No other complaints.    HEALTH ISSUES - PROBLEM Dx:  Syncope    Pituitary adenoma    Hypothyroidism    Adrenal insufficiency    History of prediabetes    CLL (chronic lymphocytic leukemia)    Hepatitis B    Fracture of lumbar spine    REBECCA (acute kidney injury)    Prophylactic measure    Acute low back pain          MEDICATIONS  (STANDING):  acetaminophen     Tablet .. 1000 milliGRAM(s) Oral every 8 hours  hydrocortisone 10 milliGRAM(s) Oral <User Schedule>  hydrocortisone 5 milliGRAM(s) Oral <User Schedule>  levothyroxine 88 MICROGram(s) Oral daily  lidocaine   4% Patch 1 Patch Transdermal daily  sodium chloride 0.9%. 1000 milliLiter(s) IV Continuous <Continuous>  tenofovir disoproxil fumarate (VIREAD) 300 milliGRAM(s) Oral daily    Allergies    No Known Allergies    Intolerances                            12.8   18.70 )-----------( 144      ( 14 Nov 2024 05:25 )             38.0     14 Nov 2024 05:25    138    |  109    |  19     ----------------------------<  101    4.2     |  25     |  1.34     Ca    9.1        14 Nov 2024 05:25  Mg     2.4       13 Nov 2024 15:56    TPro  6.6    /  Alb  3.8    /  TBili  0.7    /  DBili  x      /  AST  19     /  ALT  20     /  AlkPhos  69     14 Nov 2024 05:25    PT/INR - ( 13 Nov 2024 15:56 )   PT: 10.6 sec;   INR: 0.91 ratio         PTT - ( 13 Nov 2024 15:56 )  PTT:32.7 sec  Vital Signs Last 24 Hrs  T(C): 36.7 (11-14-24 @ 11:08), Max: 37.1 (11-14-24 @ 05:23)  T(F): 98.1 (11-14-24 @ 11:08), Max: 98.7 (11-14-24 @ 05:23)  HR: 79 (11-14-24 @ 11:08) (68 - 85)  BP: 107/71 (11-14-24 @ 11:08) (101/65 - 146/83)  BP(mean): 75 (11-14-24 @ 05:23) (75 - 75)  RR: 18 (11-14-24 @ 11:08) (18 - 18)  SpO2: 97% (11-14-24 @ 11:08) (96% - 100%)  Imaging: XR demonstrates R/L proximal humerus fracture    Physical Exam  Gen: NAD, Alert and Awake, Follows Commands  R/L UE: Skin intact, Moderate Swelling to shoulder noted. Cannot AROM shoulder due to pain. r/u/m/ain/pin function intact. SILT over deltoid. SILT digits 1-5, warm to touch. 2+ radial pulse. Compartments soft and compressible.     A/P: 64y Male with R/L proximal humerus fracture  Pain control  NWB R/L UE   Sling at all times  Ice plenty  Active movement of fingers/elbow encouraged  Ca/Vit D, outpt osteoporosis workup  Possible need for surgical intervention discussed with patient  All question answered  Follow up with  – as outpatient within 1 week, call office for appointment   Ortho stable  for DC

## 2024-11-14 NOTE — CONSULT NOTE ADULT - SUBJECTIVE AND OBJECTIVE BOX
NEUROLOGY CONSULT NOTE    NAME:  LUMA DENNISON      ASSESSMENT:  64M with syncopal event, concerning for sequelae of panhypopituitarism      RECOMMENDATIONS:    - Maintain in Telemetry unit with Q4H VS & Neurochecks    - MRI Pituitary w/wo Gadolinium approved to evalutae for pituitary lesion    - Plentiful fluid hydration (2 liters, or 8 glasses, of water daily) encouraged    - Patient counseled to maintain caution with rapid changes in position    - PT/OT as needed    - DVT ppx: SCDs, Enoxaparin or Heparin if safe          NOTE TO BE COMPLETED - PLEASE REFER TO ABOVE ONLY AND IGNORE INFORMATION BELOW    *******************************      CHIEF COMPLAINT:  Patient is a 64y old  Male who presents with a chief complaint of Syncope (14 Nov 2024 18:08)      HPI:  Pt is a 64-year-old male with 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 Wife at bedside also provided collateral information. Patient was feeling well all morning, and in the afternoon at 1 pm when walking to the bathroom he suddenly felt flushed and dizzy, had blurry vision and syncopized on the floor.  Patient does not remember very well regarding how he ended up on the floor or for how long he was on the floor.  When he woke up, he called his family who arrived to help him.  By the time his family came patient was able to stand and his dizziness improved.  After the fall patient states he felt diaphoretic  and was mildly confused. When he fell, patient hit his back on the floor. He denies any LOC, head trauma. Patient has a history of migraines but states they usually do not present this way or this suddenly and acutely.  In the ED, patient initially complained of sharp 10 out of 10 mid- lower back pain that is exacerbated by even minimal movement.  He is able to ambulate and move all extremities with no issues.  Denies any saddle anesthesia,  fecal or urinary incontinence, tingling in his lower extremities or radicular pain.  patient denies any symptoms of recent illness, nausea, vomiting, diarrhea, or fever.  Patient has previously had a similar episode occur 4 years ago in 2020.  At this time he came to the hospital and was found to have a pituitary adenoma for which he regularly follows with endocrinology Dr. Jenkins outpatient.  Patient is currently taking daily hydrocortisone and levothyroxine. On my exam in ED, patient endorses lower back pain that is worse when he moves.  His symptoms of dizziness have since resolved.  Patient admitted to medicine for syncope workup and acute L1 fracture.  (13 Nov 2024 21:41)      NEURO HPI:      PAST MEDICAL & SURGICAL HISTORY:  Hypercholesterolemia  CLL (chronic lymphocytic leukemia)  Pituitary adenoma  Hepatitis B  S/P appendectomy, 10 years ago      MEDICATIONS:  acetaminophen     Tablet .. 1000 milliGRAM(s) Oral every 8 hours  hydrocortisone 10 milliGRAM(s) Oral <User Schedule>  hydrocortisone 5 milliGRAM(s) Oral <User Schedule>  influenza   Vaccine 0.5 milliLiter(s) IntraMuscular once  levothyroxine 88 MICROGram(s) Oral daily  lidocaine   4% Patch 1 Patch Transdermal daily  melatonin 3 milliGRAM(s) Oral at bedtime PRN  methocarbamol 500 milliGRAM(s) Oral every 8 hours PRN  oxyCODONE    IR 5 milliGRAM(s) Oral every 4 hours PRN  sodium chloride 0.9%. 1000 milliLiter(s) IV Continuous <Continuous>  tenofovir disoproxil fumarate (VIREAD) 300 milliGRAM(s) Oral daily      ALLERGIES:  No Known Allergies      FAMILY HISTORY:        SOCIAL HISTORY:  Denies alcohol, tobacco, or illicit drug use      REVIEW OF SYSTEMS:  GENERAL: No fever, weight changes, fatigue  EYES: No eye pain or discharge  EAR/NOSE/MOUTH/THROAT: No sinus or throat pain; No difficulty hearing  NECK: No pain or stiffness  RESPIRATORY: No cough, wheezing, chills, or hemoptysis  CARDIOVASCULAR: No chest pain, palpitations, shortness of breath, or dyspnea on exertion  GASTROINTESTINAL: No abdominal pain, nausea, vomiting, hematemesis, diarrhea, or constipation  GENITOURINARY: No dysuria, frequency, hematuria, or incontinence  SKIN: No rashes or lesions  ENDOCRINE: No heat or cold intolerance  HEMATOLOGIC: No easy bruising or bleeding  PSYCHIATRIC: No depression, anxiety, or mood swings  MUSCULOSKELETAL: No joint pain or swelling  NEUROLOGICAL: As per HPI          OBJECTIVE:    Vital Signs Last 24 Hrs  T(C): 36.5 (14 Nov 2024 20:51), Max: 37.1 (14 Nov 2024 05:23)  T(F): 97.7 (14 Nov 2024 20:51), Max: 98.7 (14 Nov 2024 05:23)  HR: 68 (14 Nov 2024 20:51) (68 - 81)  BP: 103/63 (14 Nov 2024 20:51) (99/57 - 110/68)  BP(mean): 82 (14 Nov 2024 16:09) (75 - 82)  RR: 18 (14 Nov 2024 20:51) (17 - 18)  SpO2: 98% (14 Nov 2024 20:51) (95% - 98%)  Parameters below as of 14 Nov 2024 20:51  Patient On (Oxygen Delivery Method): room air      General Examination:  General: No acute distress  HEENT: Atraumatic, Normocephalic  Respiratory: CTA B/l.  No crackles, rhonchi, or wheezes.  Cardiovascular: RRR.  Normal S1 & S2.  Normal b/l radial and pedal pulses.    Neurological Examination:  General / Mental Status: AAO x 3.  No aphasia or dysarthria.  Naming and repetition intact.  Cranial Nerves: VFF x 4.  PERRL.  EOMI x 2, No nystagmus or diplopia.  B/l V1-V3 equal and intact to light touch and pinprick.  Symmetric facial movement and palate elevation.  B/l hearing equal to finger rub.  5/5 strength with b/l sternocleidomastoid and trapezius.  Midline tongue protrusion, with no atrophy or fasciculations.  Motor: Normal bulk & tone in all four extremities.  5/5 strength throughout all four extremities.  No downward drift, rigidity, spasticity, or tremors in any of the four extremities.  Sensory: Intact to light touch and pinprick in all four extremities.  Negative Romberg.  Reflex: 2+ and symmetric at b/l biceps, triceps, brachioradialis, patellae, and ankles.  Downgoing toes b/l.  Coordination: No dysmetria with b/l finger-to-nose and heel raise tests.  Symmetric rapid alternating movements b/l.  Gait: Normal, narrow-based gait.  No difficulty with tiptoe, heel, and tandem gaits.          LABORATORY VALUES:                        12.8   18.70 )-----------( 144      ( 14 Nov 2024 05:25 )             38.0       11-14    138  |  109[H]  |  19[H]  ----------------------------<  101[H]  4.2   |  25  |  1.34[H]    Ca    9.1      14 Nov 2024 05:25  Mg     2.4     11-13    TPro  6.6  /  Alb  3.8  /  TBili  0.7  /  DBili  x   /  AST  19  /  ALT  20  /  AlkPhos  69  11-14          NEUROIMAGING:          Please contact the Neurology consult service with any neurological questions.    Pancho Cordova MD   of Neurology  [facility] at [facility] NEUROLOGY CONSULT NOTE    NAME:  LUMA DENNISON      ASSESSMENT:  64M with syncopal event, concerning for sequelae of panhypopituitarism      RECOMMENDATIONS:    - Maintain in Telemetry unit with Q4H VS & Neurochecks    - MRI Pituitary w/wo Gadolinium approved to evaluate for pituitary lesion    - Plentiful fluid hydration (2 liters, or 8 glasses, of water daily) encouraged    - Patient counseled to maintain caution with rapid changes in position    - PT/OT as needed    - DVT ppx: SCDs, Enoxaparin or Heparin if safe          *******************************      CHIEF COMPLAINT:  Patient is a 64y old  Male who presents with a chief complaint of Syncope (14 Nov 2024 18:08)      HPI:  Pt is a 64-year-old male with 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 Wife at bedside also provided collateral information. Patient was feeling well all morning, and in the afternoon at 1 pm when walking to the bathroom he suddenly felt flushed and dizzy, had blurry vision and syncopized on the floor.  Patient does not remember very well regarding how he ended up on the floor or for how long he was on the floor.  When he woke up, he called his family who arrived to help him.  By the time his family came patient was able to stand and his dizziness improved.  After the fall patient states he felt diaphoretic  and was mildly confused. When he fell, patient hit his back on the floor. He denies any LOC, head trauma. Patient has a history of migraines but states they usually do not present this way or this suddenly and acutely.  In the ED, patient initially complained of sharp 10 out of 10 mid- lower back pain that is exacerbated by even minimal movement.  He is able to ambulate and move all extremities with no issues.  Denies any saddle anesthesia,  fecal or urinary incontinence, tingling in his lower extremities or radicular pain.  Patient denies any symptoms of recent illness, nausea, vomiting, diarrhea, or fever.  Patient has previously had a similar episode occur 4 years ago in 2020.  At this time he came to the hospital and was found to have a pituitary adenoma for which he regularly follows with endocrinology Dr. Jenkins outpatient.  Patient is currently taking daily hydrocortisone and levothyroxine. On my exam in ED, patient endorses lower back pain that is worse when he moves.  His symptoms of dizziness have since resolved.  Patient admitted to medicine for syncope workup and acute L1 fracture.  (13 Nov 2024 21:41)      NEURO HPI:  64 RHM with history of pituitary adenoma with associated panhypopituitarism and central hypothyroidism, presenting after experiencing an episode of dizziness and blurry vision with associated loss of consciousness and fall to the floor.       PAST MEDICAL & SURGICAL HISTORY:  Hypercholesterolemia  CLL (chronic lymphocytic leukemia)  Pituitary adenoma  Hepatitis B  S/P appendectomy, 10 years ago      MEDICATIONS:  acetaminophen     Tablet .. 1000 milliGRAM(s) Oral every 8 hours  hydrocortisone 10 milliGRAM(s) Oral <User Schedule>  hydrocortisone 5 milliGRAM(s) Oral <User Schedule>  influenza   Vaccine 0.5 milliLiter(s) IntraMuscular once  levothyroxine 88 MICROGram(s) Oral daily  lidocaine   4% Patch 1 Patch Transdermal daily  melatonin 3 milliGRAM(s) Oral at bedtime PRN  methocarbamol 500 milliGRAM(s) Oral every 8 hours PRN  oxyCODONE    IR 5 milliGRAM(s) Oral every 4 hours PRN  sodium chloride 0.9%. 1000 milliLiter(s) IV Continuous <Continuous>  tenofovir disoproxil fumarate (VIREAD) 300 milliGRAM(s) Oral daily      ALLERGIES:  No Known Allergies      FAMILY HISTORY:  No reported family history of syncope      SOCIAL HISTORY:  Denies alcohol, tobacco, or illicit drug use      REVIEW OF SYSTEMS:  GENERAL: No fever, weight changes, fatigue  EYES: Recent blurry vision; No eye pain or discharge  EAR/NOSE/MOUTH/THROAT: No sinus or throat pain; No difficulty hearing  NECK: No pain or stiffness  RESPIRATORY: No cough, wheezing, chills, or hemoptysis  CARDIOVASCULAR: No chest pain, palpitations, shortness of breath, or dyspnea on exertion  GASTROINTESTINAL: No abdominal pain, nausea, vomiting, hematemesis, diarrhea, or constipation  GENITOURINARY: No dysuria, frequency, hematuria, or incontinence  SKIN: No rashes or lesions  ENDOCRINE: No heat or cold intolerance  HEMATOLOGIC: No easy bruising or bleeding  PSYCHIATRIC: No depression, anxiety, or mood swings  MUSCULOSKELETAL: Recent lower back pain  NEUROLOGICAL: As per HPI          OBJECTIVE:    Vital Signs Last 24 Hrs  T(C): 36.5 (14 Nov 2024 20:51), Max: 37.1 (14 Nov 2024 05:23)  T(F): 97.7 (14 Nov 2024 20:51), Max: 98.7 (14 Nov 2024 05:23)  HR: 68 (14 Nov 2024 20:51) (68 - 81)  BP: 103/63 (14 Nov 2024 20:51) (99/57 - 110/68)  BP(mean): 82 (14 Nov 2024 16:09) (75 - 82)  RR: 18 (14 Nov 2024 20:51) (17 - 18)  SpO2: 98% (14 Nov 2024 20:51) (95% - 98%)  Parameters below as of 14 Nov 2024 20:51  Patient On (Oxygen Delivery Method): room air      General Examination:  General: No acute distress  HEENT: Atraumatic, Normocephalic  Respiratory: CTA B/l.  No crackles, rhonchi, or wheezes.  Cardiovascular: RRR.  Normal S1 & S2.  Normal b/l radial and pedal pulses.    Neurological Examination:  General / Mental Status: AAO x 3.  No aphasia or dysarthria.  Naming and repetition intact.  Cranial Nerves: VFF x 4.  PERRL.  EOMI x 2, No nystagmus or diplopia.  B/l V1-V3 equal and intact to light touch and pinprick.  Symmetric facial movement and palate elevation.  B/l hearing equal to finger rub.  5/5 strength with b/l sternocleidomastoid and trapezius.  Midline tongue protrusion, with no atrophy or fasciculations.  Motor: Normal bulk & tone in all four extremities.  5/5 strength throughout all four extremities.  No downward drift, rigidity, spasticity, or tremors in any of the four extremities.  Sensory: Intact to light touch and pinprick in all four extremities.  Reflex: 2+ and symmetric at b/l biceps and patellae.  1+ and symmetric at b/l triceps, brachioradialis, and ankles.  Downgoing toes b/l.  Coordination: No dysmetria with b/l finger-to-nose and heel raise tests.  Gait and Romberg sign testing deferred per patient preference.          LABORATORY VALUES:                        12.8   18.70 )-----------( 144      ( 14 Nov 2024 05:25 )             38.0       11-14    138  |  109[H]  |  19[H]  ----------------------------<  101[H]  4.2   |  25  |  1.34[H]    Ca    9.1      14 Nov 2024 05:25  Mg     2.4     11-13    TPro  6.6  /  Alb  3.8  /  TBili  0.7  /  DBili  x   /  AST  19  /  ALT  20  /  AlkPhos  69  11-14          NEUROIMAGING:      CT Head & CT Cervical Spine (11/13/24):  - No acute intracranial structural abnormality  - Increased density in pituitary gland suggesting calcification  - No acute cervical spine fracture or subluxation  - Multilevel cervical spine degenerative changes          Please contact the Neurology consult service with any neurological questions.    Pancho Cordova MD   of Neurology  [facility] at [facility]

## 2024-11-14 NOTE — CONSULT NOTE ADULT - CONSULT REQUESTED DATE/TIME
14-Nov-2024 13:00
14-Nov-2024 09:42
14-Nov-2024 12:55
14-Nov-2024 15:27
14-Nov-2024 19:15
14-Nov-2024 12:22

## 2024-11-14 NOTE — CONSULT NOTE ADULT - SUBJECTIVE AND OBJECTIVE BOX
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 Wife at bedside also provided collateral information. Patient was feeling well all morning, and in the afternoon at 1 pm when walking to the bathroom he suddenly felt flushed and dizzy, had blurry vision and syncopized on the floor.  Patient does not remember very well regarding how he ended up on the floor or for how long he was on the floor.  When he woke up, he called his family who arrived to help him.  By the time his family came patient was able to stand and his dizziness improved.  After the fall patient states he felt diaphoretic  and was mildly confused. When he fell, patient hit his back on the floor. He denies any LOC, head trauma. Patient has a history of migraines but states they usually do not present this way or this suddenly and acutely.  In the ED, patient initially complained of sharp 10 out of 10 mid- lower back pain that is exacerbated by even minimal movement.  He is able to ambulate and move all extremities with no issues.  Denies any saddle anesthesia,  fecal or urinary incontinence, tingling in his lower extremities or radicular pain.  patient denies any symptoms of recent illness, nausea, vomiting, diarrhea, or fever.  Patient has previously had a similar episode occur 4 years ago in 2020.  At this time he came to the hospital and was found to have a pituitary adenoma for which he regularly follows with endocrinology Dr. Jenkins outpatient.  Patient is currently taking daily hydrocortisone and levothyroxine. On my exam in ED, patient endorses lower back pain that is worse when he moves.  His symptoms of dizziness have since resolved.  Patient admitted to medicine for syncope workup and acute L1 fracture.  (13 Nov 2024 21:41)    Pt is admitted for acute back pain post syncopal episode. CTAP demonstrates an acute L1 vertebral body fracture. Pain service consulted for management of back pain. Pt seen and examined this morning. At time of assessment, patient laying in ED stretcher in the presence of his wife "Kiki," reports pain score 10/10 with movement SCALE USED: (1-10 VNRS). Per patient, he felt dizzy while walking to the bathroom and fell. He recalls a similar episode 4 years ago. Pt describes pain as localized to left low back constant, aching, and throbbing in quality. The pain is alleviated by pain medication and exacerbated by movement. Pt tolerating PO diet. Denies lethargy, chest pain, SOB, nausea, vomiting, constipation. Reports last BM 11/13. Patient stated goal for pain control: to be able to take deep breaths, get out of bed to chair and ambulate with tolerable pain control. Pt denies taking medications for pain at home.     PAST MEDICAL & SURGICAL HISTORY:  Hypercholesterolemia    CLL (chronic lymphocytic leukemia)    Pituitary adenoma    Hepatitis B    S/P appendectomy  10 years ago    FAMILY HISTORY:    Social History:  lives at home with wife, works part time (13 Nov 2024 21:41)  [x] Denies ETOH use, illicit drug use, current everyday cigarette smoker (3 cigarettes per day)    Allergies    No Known Allergies    Intolerances    MEDICATIONS  (STANDING):  acetaminophen     Tablet .. 1000 milliGRAM(s) Oral every 8 hours  hydrocortisone 10 milliGRAM(s) Oral <User Schedule>  hydrocortisone 5 milliGRAM(s) Oral <User Schedule>  levothyroxine 88 MICROGram(s) Oral daily  lidocaine   4% Patch 1 Patch Transdermal daily  sodium chloride 0.9%. 1000 milliLiter(s) (125 mL/Hr) IV Continuous <Continuous>  tenofovir disoproxil fumarate (VIREAD) 300 milliGRAM(s) Oral daily    MEDICATIONS  (PRN):  melatonin 3 milliGRAM(s) Oral at bedtime PRN Insomnia  methocarbamol 500 milliGRAM(s) Oral every 8 hours PRN Muscle Spasm  oxyCODONE    IR 5 milliGRAM(s) Oral every 4 hours PRN Severe Pain (7 - 10)    Vital Signs Last 24 Hrs  T(C): 36.7 (14 Nov 2024 11:08), Max: 37.1 (14 Nov 2024 05:23)  T(F): 98.1 (14 Nov 2024 11:08), Max: 98.7 (14 Nov 2024 05:23)  HR: 79 (14 Nov 2024 11:08) (68 - 85)  BP: 107/71 (14 Nov 2024 11:08) (101/65 - 146/83)  BP(mean): 75 (14 Nov 2024 05:23) (75 - 75)  RR: 18 (14 Nov 2024 11:08) (18 - 18)  SpO2: 97% (14 Nov 2024 11:08) (96% - 100%)    Parameters below as of 14 Nov 2024 11:08  Patient On (Oxygen Delivery Method): room air    LABS: Reviewed.                          12.8   18.70 )-----------( 144      ( 14 Nov 2024 05:25 )             38.0     11-14    138  |  109[H]  |  19[H]  ----------------------------<  101[H]  4.2   |  25  |  1.34[H]    Ca    9.1      14 Nov 2024 05:25  Mg     2.4     11-13    TPro  6.6  /  Alb  3.8  /  TBili  0.7  /  DBili  x   /  AST  19  /  ALT  20  /  AlkPhos  69  11-14    PT/INR - ( 13 Nov 2024 15:56 )   PT: 10.6 sec;   INR: 0.91 ratio         PTT - ( 13 Nov 2024 15:56 )  PTT:32.7 sec  LIVER FUNCTIONS - ( 14 Nov 2024 05:25 )  Alb: 3.8 g/dL / Pro: 6.6 g/dL / ALK PHOS: 69 U/L / ALT: 20 U/L DA / AST: 19 U/L / GGT: x           Urinalysis Basic - ( 14 Nov 2024 05:25 )    Color: x / Appearance: x / SG: x / pH: x  Gluc: 101 mg/dL / Ketone: x  / Bili: x / Urobili: x   Blood: x / Protein: x / Nitrite: x   Leuk Esterase: x / RBC: x / WBC x   Sq Epi: x / Non Sq Epi: x / Bacteria: x    CAPILLARY BLOOD GLUCOSE    POCT Blood Glucose.: 113 mg/dL (13 Nov 2024 14:55)    Radiology: Reviewed.   < from: CT Abdomen and Pelvis w/ IV Cont (11.13.24 @ 18:28) >    ACC: [id number] EXAM:  CT ABDOMEN AND PELVIS IC   ORDERED BY: BLANCA PEÑA     ACC: [id number] EXAM:  CT CHEST IC   ORDERED BY: BLANCA PEÑA     PROCEDURE DATE:  11/13/2024      INTERPRETATION:  CLINICAL INFORMATION: Left mid back pain    COMPARISON: CT chest March 18, 2020, CT abdomen/pelvis March 17, 2020    CONTRAST/COMPLICATIONS:  IV Contrast: Omnipaque 350 (accession [id number]), IV contrast documented   in unlinked concurrent exam (accession [id number])  90 cc administered   10   cc discarded  Oral Contrast: NONE  None reported at time of study completion    PROCEDURE:  CT of the Chest, Abdomen and Pelvis was performed.  Sagittal and coronal reformats were performed.    FINDINGS:  CHEST:  LUNGS AND LARGE AIRWAYS: Patent central airways.Paraseptal emphysema and   biapical scar.  PLEURA: No pleural effusion.  VESSELS: Within normal limits.  HEART: Heart size is normal. No pericardial effusion.  MEDIASTINUM AND TAVO: No lymphadenopathy.  CHEST WALL AND LOWER NECK: Within normal limits.    ABDOMEN AND PELVIS:  LIVER: Subcentimeter hypoattenuating right lobe lesion which is too small   to characterize.  BILE DUCTS: Normal caliber.  GALLBLADDER: Cholelithiasis.  SPLEEN: Mildly enlarged, measuring 13.4 cm.  PANCREAS: Within normal limits.  ADRENALS: Within normal limits.  KIDNEYS/URETERS: Within normal limits.    BLADDER: Within normal limits.  REPRODUCTIVE ORGANS: Prostate within normal limits.    BOWEL: No bowel obstruction. Appendix not visualized. Colonic   diverticulosis without diverticulitis.  PERITONEUM/RETROPERITONEUM: Within normal limits.  VESSELS: Atherosclerotic changes.  LYMPH NODES: No lymphadenopathy.  ABDOMINAL WALL: Within normal limits.  BONES: Acute fracture of the L1 vertebral body with mild compression   deformity of the superior endplate and no retropulsion.    IMPRESSION:  Acute L1 vertebral body fracture as described.    --- End of Report ---    GEETA TOLENTINO MD; Attending Radiologist  This document has been electronically signed. Nov 13 2024  6:45PM    < end of copied text >    ORT Score -   Family Hx of substance abuse	Female	      Male  Alcohol 	                                           1                     3  Illegal drugs	                                   2                     3  Rx drugs                                           4 	                  4  Personal Hx of substance abuse		  Alcohol 	                                          3	                  3  Illegal drugs                                     4	                  4  Rx drugs                                            5 	                  5  Age between 16- 45 years	           1                     1  hx preadolescent sexual abuse	   3 	                  0  Psychological disease		  ADD, OCD, bipolar, schizophrenia   2	          2  Depression                                           1 	          1  Total: 0    a score of 3 or lower indicates low risk for opioid abuse		  a score of 4-7 indicates moderate risk for opioid abuse		  a score of 8 or higher indicates high risk for opioid abuse  	  REVIEW OF SYSTEMS:  CONSTITUTIONAL: No fever or fatigue  HEENT:  No difficulty hearing, no change in vision  NECK: No pain or stiffness  RESPIRATORY: No cough, wheezing, chills or hemoptysis; No shortness of breath  CARDIOVASCULAR: No chest pain, palpitations, dizziness, or leg swelling  GASTROINTESTINAL: No loss of appetite, decreased PO intake. No abdominal or epigastric pain. No nausea, vomiting; No diarrhea or constipation.   GENITOURINARY: No dysuria, frequency, hematuria, retention or incontinence  MUSCULOSKELETAL: No joint pain or swelling; + low back pain, no upper or lower motor strength weakness, no saddle anesthesia, bowel/bladder incontinence, + fall  NEURO: No headaches, No numbness/tingling b/l LE, No weakness  ENDOCRINE: No polyuria, polydipsia, heat or cold intolerance; No hair loss  PSYCHIATRIC: No depression, anxiety or difficulty sleeping    PHYSICAL EXAM:  GENERAL:  Alert & Oriented X4, cooperative, NAD, Good concentration. Speech is clear.   RESPIRATORY: Respirations even and unlabored. Clear to auscultation bilaterally  CARDIOVASCULAR: Normal S1/S2, regular rate and rhythm  GASTROINTESTINAL:  Soft, Nontender, Nondistended; Bowel sounds present  PERIPHERAL VASCULAR:  Extremities warm without edema. 2+ Peripheral Pulses, No cyanosis, No calf tenderness  MUSCULOSKELETAL: Motor Strength 5/5 B/L upper and lower extremities; moves all extremities equally against gravity; decreased ROM due to pain, negative SLR; + lumbar paraspinal tenderness due to pain  SKIN: Warm, dry, intact.     Risk factors associated with adverse outcomes related to opioid treatment  [ ] Concurrent benzodiazepine use  [ ] History/ Active substance use or alcohol use disorder  [ ] Psychiatric co-morbidity  [ ] Sleep apnea  [ ] COPD  [ ] BMI> 35  [ ] Liver dysfunction  [x] Renal dysfunction  [ ] CHF  [x] Smoker  [x] Age > 60 years    [x]  [place]  Reviewed and Copied to Chart. See below.    Plan of care and goal oriented pain management treatment options were discussed with patient and /or primary care giver; all questions and concerns were addressed and care was aligned with patient's wishes.    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 Wife at bedside also provided collateral information. Patient was feeling well all morning, and in the afternoon at 1 pm when walking to the bathroom he suddenly felt flushed and dizzy, had blurry vision and syncopized on the floor.  Patient does not remember very well regarding how he ended up on the floor or for how long he was on the floor.  When he woke up, he called his family who arrived to help him.  By the time his family came patient was able to stand and his dizziness improved.  After the fall patient states he felt diaphoretic  and was mildly confused. When he fell, patient hit his back on the floor. He denies any LOC, head trauma. Patient has a history of migraines but states they usually do not present this way or this suddenly and acutely.  In the ED, patient initially complained of sharp 10 out of 10 mid- lower back pain that is exacerbated by even minimal movement.  He is able to ambulate and move all extremities with no issues.  Denies any saddle anesthesia,  fecal or urinary incontinence, tingling in his lower extremities or radicular pain.  patient denies any symptoms of recent illness, nausea, vomiting, diarrhea, or fever.  Patient has previously had a similar episode occur 4 years ago in 2020.  At this time he came to the hospital and was found to have a pituitary adenoma for which he regularly follows with endocrinology Dr. Jenkins outpatient.  Patient is currently taking daily hydrocortisone and levothyroxine. On my exam in ED, patient endorses lower back pain that is worse when he moves.  His symptoms of dizziness have since resolved.  Patient admitted to medicine for syncope workup and acute L1 fracture.  (13 Nov 2024 21:41)    Pt is admitted for acute back pain post syncopal episode. CTAP demonstrates an acute L1 vertebral body fracture. Pain service consulted for management of back pain. Pt seen and examined this morning. At time of assessment, patient laying in ED stretcher in the presence of his wife "Kiki," reports pain score 10/10 with movement SCALE USED: (1-10 VNRS). Per patient, he felt dizzy while walking to the bathroom and fell. He recalls a similar episode 4 years ago. Pt describes pain as localized to left low back constant, aching, and throbbing in quality. The pain is alleviated by pain medication and exacerbated by movement. He denies bowel/bladder dysfunction. Pt tolerating PO diet. Denies lethargy, chest pain, SOB, nausea, vomiting, constipation. Reports last BM 11/13. Patient stated goal for pain control: to be able to take deep breaths, get out of bed to chair and ambulate with tolerable pain control. Pt denies taking medications for pain at home.     PAST MEDICAL & SURGICAL HISTORY:  Hypercholesterolemia    CLL (chronic lymphocytic leukemia)    Pituitary adenoma    Hepatitis B    S/P appendectomy  10 years ago    FAMILY HISTORY:    Social History:  lives at home with wife, works part time (13 Nov 2024 21:41)  [x] Denies ETOH use, illicit drug use, current everyday cigarette smoker (3 cigarettes per day)    Allergies    No Known Allergies    Intolerances    MEDICATIONS  (STANDING):  acetaminophen     Tablet .. 1000 milliGRAM(s) Oral every 8 hours  hydrocortisone 10 milliGRAM(s) Oral <User Schedule>  hydrocortisone 5 milliGRAM(s) Oral <User Schedule>  levothyroxine 88 MICROGram(s) Oral daily  lidocaine   4% Patch 1 Patch Transdermal daily  sodium chloride 0.9%. 1000 milliLiter(s) (125 mL/Hr) IV Continuous <Continuous>  tenofovir disoproxil fumarate (VIREAD) 300 milliGRAM(s) Oral daily    MEDICATIONS  (PRN):  melatonin 3 milliGRAM(s) Oral at bedtime PRN Insomnia  methocarbamol 500 milliGRAM(s) Oral every 8 hours PRN Muscle Spasm  oxyCODONE    IR 5 milliGRAM(s) Oral every 4 hours PRN Severe Pain (7 - 10)    Vital Signs Last 24 Hrs  T(C): 36.7 (14 Nov 2024 11:08), Max: 37.1 (14 Nov 2024 05:23)  T(F): 98.1 (14 Nov 2024 11:08), Max: 98.7 (14 Nov 2024 05:23)  HR: 79 (14 Nov 2024 11:08) (68 - 85)  BP: 107/71 (14 Nov 2024 11:08) (101/65 - 146/83)  BP(mean): 75 (14 Nov 2024 05:23) (75 - 75)  RR: 18 (14 Nov 2024 11:08) (18 - 18)  SpO2: 97% (14 Nov 2024 11:08) (96% - 100%)    Parameters below as of 14 Nov 2024 11:08  Patient On (Oxygen Delivery Method): room air    LABS: Reviewed.                          12.8   18.70 )-----------( 144      ( 14 Nov 2024 05:25 )             38.0     11-14    138  |  109[H]  |  19[H]  ----------------------------<  101[H]  4.2   |  25  |  1.34[H]    Ca    9.1      14 Nov 2024 05:25  Mg     2.4     11-13    TPro  6.6  /  Alb  3.8  /  TBili  0.7  /  DBili  x   /  AST  19  /  ALT  20  /  AlkPhos  69  11-14    PT/INR - ( 13 Nov 2024 15:56 )   PT: 10.6 sec;   INR: 0.91 ratio         PTT - ( 13 Nov 2024 15:56 )  PTT:32.7 sec  LIVER FUNCTIONS - ( 14 Nov 2024 05:25 )  Alb: 3.8 g/dL / Pro: 6.6 g/dL / ALK PHOS: 69 U/L / ALT: 20 U/L DA / AST: 19 U/L / GGT: x           Urinalysis Basic - ( 14 Nov 2024 05:25 )    Color: x / Appearance: x / SG: x / pH: x  Gluc: 101 mg/dL / Ketone: x  / Bili: x / Urobili: x   Blood: x / Protein: x / Nitrite: x   Leuk Esterase: x / RBC: x / WBC x   Sq Epi: x / Non Sq Epi: x / Bacteria: x    CAPILLARY BLOOD GLUCOSE    POCT Blood Glucose.: 113 mg/dL (13 Nov 2024 14:55)    Radiology: Reviewed.   < from: CT Abdomen and Pelvis w/ IV Cont (11.13.24 @ 18:28) >    ACC: [id number] EXAM:  CT ABDOMEN AND PELVIS IC   ORDERED BY: BLANCA PEÑA     ACC: [id number] EXAM:  CT CHEST IC   ORDERED BY: BLANCA PEÑA     PROCEDURE DATE:  11/13/2024      INTERPRETATION:  CLINICAL INFORMATION: Left mid back pain    COMPARISON: CT chest March 18, 2020, CT abdomen/pelvis March 17, 2020    CONTRAST/COMPLICATIONS:  IV Contrast: Omnipaque 350 (accession [id number]), IV contrast documented   in unlinked concurrent exam (accession [id number])  90 cc administered   10   cc discarded  Oral Contrast: NONE  None reported at time of study completion    PROCEDURE:  CT of the Chest, Abdomen and Pelvis was performed.  Sagittal and coronal reformats were performed.    FINDINGS:  CHEST:  LUNGS AND LARGE AIRWAYS: Patent central airways.Paraseptal emphysema and   biapical scar.  PLEURA: No pleural effusion.  VESSELS: Within normal limits.  HEART: Heart size is normal. No pericardial effusion.  MEDIASTINUM AND TAVO: No lymphadenopathy.  CHEST WALL AND LOWER NECK: Within normal limits.    ABDOMEN AND PELVIS:  LIVER: Subcentimeter hypoattenuating right lobe lesion which is too small   to characterize.  BILE DUCTS: Normal caliber.  GALLBLADDER: Cholelithiasis.  SPLEEN: Mildly enlarged, measuring 13.4 cm.  PANCREAS: Within normal limits.  ADRENALS: Within normal limits.  KIDNEYS/URETERS: Within normal limits.    BLADDER: Within normal limits.  REPRODUCTIVE ORGANS: Prostate within normal limits.    BOWEL: No bowel obstruction. Appendix not visualized. Colonic   diverticulosis without diverticulitis.  PERITONEUM/RETROPERITONEUM: Within normal limits.  VESSELS: Atherosclerotic changes.  LYMPH NODES: No lymphadenopathy.  ABDOMINAL WALL: Within normal limits.  BONES: Acute fracture of the L1 vertebral body with mild compression   deformity of the superior endplate and no retropulsion.    IMPRESSION:  Acute L1 vertebral body fracture as described.    --- End of Report ---    GEETA TOLENTINO MD; Attending Radiologist  This document has been electronically signed. Nov 13 2024  6:45PM    < end of copied text >    ORT Score -   Family Hx of substance abuse	Female	      Male  Alcohol 	                                           1                     3  Illegal drugs	                                   2                     3  Rx drugs                                           4 	                  4  Personal Hx of substance abuse		  Alcohol 	                                          3	                  3  Illegal drugs                                     4	                  4  Rx drugs                                            5 	                  5  Age between 16- 45 years	           1                     1  hx preadolescent sexual abuse	   3 	                  0  Psychological disease		  ADD, OCD, bipolar, schizophrenia   2	          2  Depression                                           1 	          1  Total: 0    a score of 3 or lower indicates low risk for opioid abuse		  a score of 4-7 indicates moderate risk for opioid abuse		  a score of 8 or higher indicates high risk for opioid abuse  	  REVIEW OF SYSTEMS:  CONSTITUTIONAL: No fever or fatigue  HEENT:  No difficulty hearing, no change in vision  NECK: No pain or stiffness  RESPIRATORY: No cough, wheezing, chills or hemoptysis; No shortness of breath  CARDIOVASCULAR: No chest pain, palpitations, dizziness, or leg swelling  GASTROINTESTINAL: No loss of appetite, decreased PO intake. No abdominal or epigastric pain. No nausea, vomiting; No diarrhea or constipation.   GENITOURINARY: No dysuria, frequency, hematuria, retention or incontinence  MUSCULOSKELETAL: No joint pain or swelling; + low back pain, no upper or lower motor strength weakness, no saddle anesthesia, bowel/bladder incontinence, + fall  NEURO: No headaches, No numbness/tingling b/l LE, No weakness  ENDOCRINE: No polyuria, polydipsia, heat or cold intolerance; No hair loss  PSYCHIATRIC: No depression, anxiety or difficulty sleeping    PHYSICAL EXAM:  GENERAL:  Alert & Oriented X4, cooperative, NAD, Good concentration. Speech is clear.   RESPIRATORY: Respirations even and unlabored. Clear to auscultation bilaterally  CARDIOVASCULAR: Normal S1/S2, regular rate and rhythm  GASTROINTESTINAL:  Soft, Nontender, Nondistended; Bowel sounds present  PERIPHERAL VASCULAR:  Extremities warm without edema. 2+ Peripheral Pulses, No cyanosis, No calf tenderness  MUSCULOSKELETAL: Motor Strength 5/5 B/L upper and lower extremities; moves all extremities equally against gravity; decreased ROM due to pain, negative SLR; + lumbar paraspinal tenderness due to pain  SKIN: Warm, dry, intact.     Risk factors associated with adverse outcomes related to opioid treatment  [ ] Concurrent benzodiazepine use  [ ] History/ Active substance use or alcohol use disorder  [ ] Psychiatric co-morbidity  [ ] Sleep apnea  [ ] COPD  [ ] BMI> 35  [ ] Liver dysfunction  [x] Renal dysfunction  [ ] CHF  [x] Smoker  [x] Age > 60 years    [x]  [place]  Reviewed and Copied to Chart. See below.    Plan of care and goal oriented pain management treatment options were discussed with patient and /or primary care giver; all questions and concerns were addressed and care was aligned with patient's wishes.    Educated patient on goal oriented pain management treatment options

## 2024-11-14 NOTE — PROGRESS NOTE ADULT - SUBJECTIVE AND OBJECTIVE BOX
NP Note discussed with  primary attending    Patient is a 64y old  Male who presents with a chief complaint of Syncope (14 Nov 2024 15:27)      INTERVAL HPI/OVERNIGHT EVENTS: no new complaints    MEDICATIONS  (STANDING):  acetaminophen     Tablet .. 1000 milliGRAM(s) Oral every 8 hours  hydrocortisone 10 milliGRAM(s) Oral <User Schedule>  hydrocortisone 5 milliGRAM(s) Oral <User Schedule>  levothyroxine 88 MICROGram(s) Oral daily  lidocaine   4% Patch 1 Patch Transdermal daily  sodium chloride 0.9%. 1000 milliLiter(s) (125 mL/Hr) IV Continuous <Continuous>  tenofovir disoproxil fumarate (VIREAD) 300 milliGRAM(s) Oral daily    MEDICATIONS  (PRN):  melatonin 3 milliGRAM(s) Oral at bedtime PRN Insomnia  methocarbamol 500 milliGRAM(s) Oral every 8 hours PRN Muscle Spasm  oxyCODONE    IR 5 milliGRAM(s) Oral every 4 hours PRN Severe Pain (7 - 10)      __________________________________________________  REVIEW OF SYSTEMS:    CONSTITUTIONAL: No fever,   RESPIRATORY: No cough; No shortness of breath  CARDIOVASCULAR: No chest pain, no palpitations  GASTROINTESTINAL: No pain. No nausea or vomiting; No diarrhea   NEUROLOGICAL: No headache or numbness, no tremors  MUSCULOSKELETAL: back pain, worse wtih movment   GENITOURINARY: no dysuria, no frequency, no hesitancy        Vital Signs Last 24 Hrs  T(C): 36.8 (14 Nov 2024 15:29), Max: 37.1 (14 Nov 2024 05:23)  T(F): 98.3 (14 Nov 2024 15:29), Max: 98.7 (14 Nov 2024 05:23)  HR: 78 (14 Nov 2024 15:29) (73 - 85)  BP: 104/68 (14 Nov 2024 15:29) (101/65 - 124/77)  BP(mean): 75 (14 Nov 2024 05:23) (75 - 75)  RR: 17 (14 Nov 2024 15:29) (17 - 18)  SpO2: 95% (14 Nov 2024 15:29) (95% - 98%)    Parameters below as of 14 Nov 2024 15:29  Patient On (Oxygen Delivery Method): room air        ________________________________________________  PHYSICAL EXAM:  GENERAL: NAD  CHEST/LUNG: Clear to ausculitation bilaterally  HEART: S1 S2  regular; no murmurs, gallops or rubs  ABDOMEN: Soft, Nontender, Nondistended; Bowel sounds present  EXTREMITIES: no cyanosis; no edema; no calf tenderness  SKIN: warm and dry; no rash  NERVOUS SYSTEM:  Awake and alert; Oriented  to place, person and time ; no new deficits    _________________________________________________  LABS:                        12.8   18.70 )-----------( 144      ( 14 Nov 2024 05:25 )             38.0     11-14    138  |  109[H]  |  19[H]  ----------------------------<  101[H]  4.2   |  25  |  1.34[H]    Ca    9.1      14 Nov 2024 05:25  Mg     2.4     11-13    TPro  6.6  /  Alb  3.8  /  TBili  0.7  /  DBili  x   /  AST  19  /  ALT  20  /  AlkPhos  69  11-14    PT/INR - ( 13 Nov 2024 15:56 )   PT: 10.6 sec;   INR: 0.91 ratio         PTT - ( 13 Nov 2024 15:56 )  PTT:32.7 sec  Urinalysis Basic - ( 14 Nov 2024 05:25 )    Color: x / Appearance: x / SG: x / pH: x  Gluc: 101 mg/dL / Ketone: x  / Bili: x / Urobili: x   Blood: x / Protein: x / Nitrite: x   Leuk Esterase: x / RBC: x / WBC x   Sq Epi: x / Non Sq Epi: x / Bacteria: x      CAPILLARY BLOOD GLUCOSE            RADIOLOGY & ADDITIONAL TESTS:    Imaging Personally Reviewed:  YES/NO    Consultant(s) Notes Reviewed:   YES/ No    Care Discussed with Consultants :     Plan of care was discussed with patient and /or primary care giver; all questions and concerns were addressed and care was aligned with patient's wishes.

## 2024-11-14 NOTE — CONSULT NOTE ADULT - SUBJECTIVE AND OBJECTIVE BOX
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 Wife at bedside also provided collateral information. Patient was feeling well all morning, and in the afternoon at 1 pm when walking to the bathroom he suddenly felt flushed and dizzy, had blurry vision and syncopized on the floor.  Patient does not remember very well regarding how he ended up on the floor or for how long he was on the floor.  When he woke up, he called his family who arrived to help him.  By the time his family came patient was able to stand and his dizziness improved.  After the fall patient states he felt diaphoretic  and was mildly confused. When he fell, patient hit his back on the floor. He denies any LOC, head trauma. Patient has a history of migraines but states they usually do not present this way or this suddenly and acutely.  In the ED, patient initially complained of sharp 10 out of 10 mid- lower back pain that is exacerbated by even minimal movement.  He is able to ambulate and move all extremities with no issues.  Denies any saddle anesthesia,  fecal or urinary incontinence, tingling in his lower extremities or radicular pain.  patient denies any symptoms of recent illness, nausea, vomiting, diarrhea, or fever.  Patient has previously had a similar episode occur 4 years ago in 2020.  At this time he came to the hospital and was found to have a pituitary adenoma for which he regularly follows with endocrinology Dr. Jenkins outpatient.  Patient is currently taking daily hydrocortisone and levothyroxine. On my exam in ED, patient endorses lower back pain that is worse when he moves.  His symptoms of dizziness have since resolved.  Patient admitted to medicine for syncope workup and acute L1 fracture.  (13 Nov 2024 21:41)      Orthopedic consult: Patient is a 65 y/o Male with PMHx of panhypopituitarism with adenoma, hypothyroid, adrenal insufficiency, CLL on oral chemo, Hep B who was seen at bedside in the ED with wife present. Patient states yesterday he was standing when he got dizzy and had a syncopal episode, fell to the ground from standing height, does not know if he hits his head or not, is not on blood thinners, did have similar episode 4 years ago was admitted to the hospital where they found pituitary adenoma. States when he regained consciousness did have severe lower back pain, is still with severe lower back pain at this time 8/10 worse with movement, denies any numbness tingling or paresthesia, denies radiating pain down BLE, denies urinary incontinence, denies saddle anesthesia  Denies       [ Denies    ] bladder/bowel incontinence or changes  [ Denies    ] saddle-like paresthesias  denies cp/sob/palpitations    Ambulation:   [ xxx     ]    Walking independently   [      ]    With Cane   [      ]    With Walker   [      ]    Bed / wheelchairbound     PAST MEDICAL & SURGICAL HISTORY:  Hypercholesterolemia      CLL (chronic lymphocytic leukemia)      Pituitary adenoma      Hepatitis B      S/P appendectomy  10 years ago          Vital Signs Last 24 Hrs  T(C): 36.7 (14 Nov 2024 11:08), Max: 37.1 (14 Nov 2024 05:23)  T(F): 98.1 (14 Nov 2024 11:08), Max: 98.7 (14 Nov 2024 05:23)  HR: 79 (14 Nov 2024 11:08) (68 - 85)  BP: 107/71 (14 Nov 2024 11:08) (101/65 - 146/83)  BP(mean): 75 (14 Nov 2024 05:23) (75 - 75)  RR: 18 (14 Nov 2024 11:08) (18 - 18)  SpO2: 97% (14 Nov 2024 11:08) (96% - 100%)    Parameters below as of 14 Nov 2024 11:08  Patient On (Oxygen Delivery Method): room air        Physical Exam:  General; Awake and alert, Oriented x 3  Hips/Pelvis:   Able to SLR bilaterally. Negative log roll, heel strike. No pain on passive Int/Ext hip rotation.  Spine exam:  Neck: supple, NT, Full Painless baseline AROM  Back:   Extremities:          -Left Upper Extremity: Atraumatic with normal alignment NROM. No crepitus. No bony tenderness.      -Right Upper Extremity: Atraumatic with normal alignment NROM. No crepitus. No bony tenderness.      -Left Lower Extremity: Atraumatic with normal alignment NROM. No crepitus. No bony tenderness. No calf tenderness, Calf soft.     -Right Lower Extremity: Atraumatic with normal alignment NROM. No crepitus. No bony tenderness.  No calf tenderness, Calf soft.         >Sensation:  intact to light touch           >Motor exam:          >Bilateral Upper extremities         Delt      Bicep      Tri      Wrist ext  Wrst Flex       Digit Ext Digit Flex                                                   R         5/5        5/5        5/5       5/5            5/5            5/5       5/5          5/5                                                   L          5/5        5/5        5/5       5/5            5/5            5/5       5/5          5/5         >Bilateral Lower ext.          Hip Flx  Hip Ext    Quad   Hamstrg   TA       EHL      GS                                          R        5/5 5/5        5/5        5/5          5/5     5/5      5/5                                          L         5/5        5/5        5/5        5/5          5/5     5/5      5/5      Labs:                        12.8   18.70 )-----------( 144      ( 14 Nov 2024 05:25 )             38.0     11-14    138  |  109[H]  |  19[H]  ----------------------------<  101[H]  4.2   |  25  |  1.34[H]    Ca    9.1      14 Nov 2024 05:25  Mg     2.4     11-13    TPro  6.6  /  Alb  3.8  /  TBili  0.7  /  DBili  x   /  AST  19  /  ALT  20  /  AlkPhos  69  11-14      Radiology:       Impression:  Pt is a  64y Male with     Plan:  - Recommendation: Conservative treatment    >     >  - Pain management    >    >  - DVT ppx with venodynes  - PT- WBAT of the lower extremities. Proper body mechanics.  - Case d/w Dr. Gillis  - Follow up with Dr. 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 Wife at bedside also provided collateral information. Patient was feeling well all morning, and in the afternoon at 1 pm when walking to the bathroom he suddenly felt flushed and dizzy, had blurry vision and syncopized on the floor.  Patient does not remember very well regarding how he ended up on the floor or for how long he was on the floor.  When he woke up, he called his family who arrived to help him.  By the time his family came patient was able to stand and his dizziness improved.  After the fall patient states he felt diaphoretic  and was mildly confused. When he fell, patient hit his back on the floor. He denies any LOC, head trauma. Patient has a history of migraines but states they usually do not present this way or this suddenly and acutely.  In the ED, patient initially complained of sharp 10 out of 10 mid- lower back pain that is exacerbated by even minimal movement.  He is able to ambulate and move all extremities with no issues.  Denies any saddle anesthesia,  fecal or urinary incontinence, tingling in his lower extremities or radicular pain.  patient denies any symptoms of recent illness, nausea, vomiting, diarrhea, or fever.  Patient has previously had a similar episode occur 4 years ago in 2020.  At this time he came to the hospital and was found to have a pituitary adenoma for which he regularly follows with endocrinology Dr. Jenkins outpatient.  Patient is currently taking daily hydrocortisone and levothyroxine. On my exam in ED, patient endorses lower back pain that is worse when he moves.  His symptoms of dizziness have since resolved.  Patient admitted to medicine for syncope workup and acute L1 fracture.  (13 Nov 2024 21:41)      Orthopedic consult: Patient is a 63 y/o Male with PMHx of panhypopituitarism with adenoma, hypothyroid, adrenal insufficiency, CLL on oral chemo, Hep B who was seen at bedside in the ED with wife present. Patient states yesterday he was standing when he got dizzy and had a syncopal episode, fell to the ground from standing height, does not know if he hits his head or not, is not on blood thinners, did have similar episode 4 years ago was admitted to the hospital where they found pituitary adenoma. States when he regained consciousness did have severe lower back pain, is still with severe lower back pain at this time 8/10 worse with movement, denies any numbness tingling or paresthesia, denies radiating pain down BLE, denies urinary incontinence, denies saddle anesthesia  Denies CP/SOB, denies palpitations, denies N/V/D      [ Denies    ] bladder/bowel incontinence or changes  [ Denies    ] saddle-like paresthesias  denies cp/sob/palpitations    Ambulation:   [ xxx     ]    Walking independently   [      ]    With Cane   [      ]    With Walker   [      ]    Bed / wheelchairbound     PAST MEDICAL & SURGICAL HISTORY:  Hypercholesterolemia      CLL (chronic lymphocytic leukemia)      Pituitary adenoma      Hepatitis B      S/P appendectomy  10 years ago          Vital Signs Last 24 Hrs  T(C): 36.7 (14 Nov 2024 11:08), Max: 37.1 (14 Nov 2024 05:23)  T(F): 98.1 (14 Nov 2024 11:08), Max: 98.7 (14 Nov 2024 05:23)  HR: 79 (14 Nov 2024 11:08) (68 - 85)  BP: 107/71 (14 Nov 2024 11:08) (101/65 - 146/83)  BP(mean): 75 (14 Nov 2024 05:23) (75 - 75)  RR: 18 (14 Nov 2024 11:08) (18 - 18)  SpO2: 97% (14 Nov 2024 11:08) (96% - 100%)    Parameters below as of 14 Nov 2024 11:08  Patient On (Oxygen Delivery Method): room air    I&O's Detail        Physical Exam:  General; Awake and alert, Oriented x 3  Hips/Pelvis:   Able to SLR bilaterally. Negative log roll, heel strike. No pain on passive Int/Ext hip rotation.  Spine exam:  Neck: supple, NT, Full Painless baseline AROM  Back: With TTP over Lumbar spine midline and left paraspinal muscles, no erythema ecchymosis , no swelling, skin intact  Extremities:          -Left Lower Extremity: Atraumatic with normal alignment NROM. No crepitus. No bony tenderness. No calf tenderness, Calf soft.     -Right Lower Extremity: Atraumatic with normal alignment NROM. No crepitus. No bony tenderness.  No calf tenderness, Calf soft.         >Sensation:  intact to light touch           >Motor exam:          >Bilateral Lower ext.          Hip Flx  Hip Ext    Quad   Hamstrg   TA       EHL      GS                                          R        5/5        5/5        5/5        5/5          5/5     5/5      5/5                                          L         5/5        5/5        5/5        5/5          5/5     5/5      5/5      Labs:                        12.8   18.70 )-----------( 144      ( 14 Nov 2024 05:25 )             38.0     11-14    138  |  109[H]  |  19[H]  ----------------------------<  101[H]  4.2   |  25  |  1.34[H]    Ca    9.1      14 Nov 2024 05:25  Mg     2.4     11-13    TPro  6.6  /  Alb  3.8  /  TBili  0.7  /  DBili  x   /  AST  19  /  ALT  20  /  AlkPhos  69  11-14      Radiology:     < from: CT Abdomen and Pelvis w/ IV Cont (11.13.24 @ 18:28) >    ACC: [id number] EXAM:  CT ABDOMEN AND PELVIS IC   ORDERED BY: BLANCA PEÑA     ACC: [id number] EXAM:  CT CHEST IC   ORDERED BY: BLANCA PEÑA     PROCEDURE DATE:  11/13/2024          INTERPRETATION:  CLINICAL INFORMATION: Left mid back pain    COMPARISON: CT chest March 18, 2020, CT abdomen/pelvis March 17, 2020    CONTRAST/COMPLICATIONS:  IV Contrast: Omnipaque 350 (accession [id number]), IV contrast documented   in unlinked concurrent exam (accession [id number])  90 cc administered   10   cc discarded  Oral Contrast: NONE  None reported at time of study completion    PROCEDURE:  CT of the Chest, Abdomen and Pelvis was performed.  Sagittal and coronal reformats were performed.    FINDINGS:  CHEST:  LUNGS AND LARGE AIRWAYS: Patent central airways.Paraseptal emphysema and   biapical scar.  PLEURA: No pleural effusion.  VESSELS: Within normal limits.  HEART: Heart size is normal. No pericardial effusion.  MEDIASTINUM AND TAVO: No lymphadenopathy.  CHEST WALL AND LOWER NECK: Within normal limits.    ABDOMEN AND PELVIS:  LIVER: Subcentimeter hypoattenuating right lobe lesion which is too small   to characterize.  BILE DUCTS: Normal caliber.  GALLBLADDER: Cholelithiasis.  SPLEEN: Mildly enlarged, measuring 13.4 cm.  PANCREAS: Within normal limits.  ADRENALS: Within normal limits.  KIDNEYS/URETERS: Within normal limits.    BLADDER: Within normal limits.  REPRODUCTIVE ORGANS: Prostate within normal limits.    BOWEL: No bowel obstruction. Appendix not visualized. Colonic   diverticulosis without diverticulitis.  PERITONEUM/RETROPERITONEUM: Within normal limits.  VESSELS: Atherosclerotic changes.  LYMPH NODES: No lymphadenopathy.  ABDOMINAL WALL: Within normal limits.  BONES: Acute fracture of the L1 vertebral body with mild compression   deformity of the superior endplate and no retropulsion.    IMPRESSION:  Acute L1 vertebral body fracture as described.        --- End of Report ---            GEETA TOLENTINO MD; Attending Radiologist  This document has been electronically signed. Nov 13 2024  6:45PM    < end of copied text >        Impression:  Pt is a  64y Male with L1 vertebral body fracture    Plan:  - Recommendation: Conservative treatment, no acute orthopedic spinal surgery recommended at this time  - Pain management  - TLSO brace, for comfort OOB, does not need brace while in bed   - DVT ppx with venodynes  - PT- WBAT of the lower extremities. Proper body mechanics.    >  PT after pain is controlled to 6 or less on pain scale, do not ambulate while patient has severe pain  - Case d/w Dr. Yu   - Follow up with Dr. Yu in office at [phone]

## 2024-11-15 ENCOUNTER — RESULT REVIEW (OUTPATIENT)
Age: 64
End: 2024-11-15

## 2024-11-15 DIAGNOSIS — Z75.8 OTHER PROBLEMS RELATED TO MEDICAL FACILITIES AND OTHER HEALTH CARE: ICD-10-CM

## 2024-11-15 LAB
ANION GAP SERPL CALC-SCNC: 4 MMOL/L — LOW (ref 5–17)
BUN SERPL-MCNC: 22 MG/DL — HIGH (ref 7–18)
CALCIUM SERPL-MCNC: 9 MG/DL — SIGNIFICANT CHANGE UP (ref 8.4–10.5)
CHLORIDE SERPL-SCNC: 109 MMOL/L — HIGH (ref 96–108)
CO2 SERPL-SCNC: 28 MMOL/L — SIGNIFICANT CHANGE UP (ref 22–31)
CREAT SERPL-MCNC: 1.31 MG/DL — HIGH (ref 0.5–1.3)
EGFR: 61 ML/MIN/1.73M2 — SIGNIFICANT CHANGE UP
GLUCOSE SERPL-MCNC: 94 MG/DL — SIGNIFICANT CHANGE UP (ref 70–99)
HCT VFR BLD CALC: 36 % — LOW (ref 39–50)
HGB BLD-MCNC: 12.4 G/DL — LOW (ref 13–17)
MCHC RBC-ENTMCNC: 29.3 PG — SIGNIFICANT CHANGE UP (ref 27–34)
MCHC RBC-ENTMCNC: 34.4 G/DL — SIGNIFICANT CHANGE UP (ref 32–36)
MCV RBC AUTO: 85.1 FL — SIGNIFICANT CHANGE UP (ref 80–100)
NRBC # BLD: 0 /100 WBCS — SIGNIFICANT CHANGE UP (ref 0–0)
PLATELET # BLD AUTO: 104 K/UL — LOW (ref 150–400)
POTASSIUM SERPL-MCNC: 4 MMOL/L — SIGNIFICANT CHANGE UP (ref 3.5–5.3)
POTASSIUM SERPL-SCNC: 4 MMOL/L — SIGNIFICANT CHANGE UP (ref 3.5–5.3)
RBC # BLD: 4.23 M/UL — SIGNIFICANT CHANGE UP (ref 4.2–5.8)
RBC # FLD: 12.6 % — SIGNIFICANT CHANGE UP (ref 10.3–14.5)
SODIUM SERPL-SCNC: 141 MMOL/L — SIGNIFICANT CHANGE UP (ref 135–145)
T4 FREE SERPL-MCNC: 1.3 NG/DL — SIGNIFICANT CHANGE UP (ref 0.9–1.8)
WBC # BLD: 10.15 K/UL — SIGNIFICANT CHANGE UP (ref 3.8–10.5)
WBC # FLD AUTO: 10.15 K/UL — SIGNIFICANT CHANGE UP (ref 3.8–10.5)

## 2024-11-15 PROCEDURE — 99232 SBSQ HOSP IP/OBS MODERATE 35: CPT

## 2024-11-15 PROCEDURE — 70553 MRI BRAIN STEM W/O & W/DYE: CPT | Mod: 26

## 2024-11-15 RX ORDER — METHOCARBAMOL 500 MG/1
500 TABLET, FILM COATED ORAL EVERY 8 HOURS
Refills: 0 | Status: DISCONTINUED | OUTPATIENT
Start: 2024-11-15 | End: 2024-11-18

## 2024-11-15 RX ADMIN — LIDOCAINE 1 PATCH: 40 CREAM TOPICAL at 19:01

## 2024-11-15 RX ADMIN — TENOFOVIR DISOPROXIL FUMARATE TABLETS 300 MILLIGRAM(S): 300 TABLET, FILM COATED ORAL at 12:00

## 2024-11-15 RX ADMIN — ACETAMINOPHEN 500MG 1000 MILLIGRAM(S): 500 TABLET, COATED ORAL at 16:39

## 2024-11-15 RX ADMIN — ACETAMINOPHEN 500MG 1000 MILLIGRAM(S): 500 TABLET, COATED ORAL at 23:19

## 2024-11-15 RX ADMIN — LIDOCAINE 1 PATCH: 40 CREAM TOPICAL at 12:00

## 2024-11-15 RX ADMIN — ACETAMINOPHEN 500MG 1000 MILLIGRAM(S): 500 TABLET, COATED ORAL at 15:39

## 2024-11-15 RX ADMIN — Medication 5 MILLIGRAM(S): at 17:26

## 2024-11-15 RX ADMIN — METHOCARBAMOL 500 MILLIGRAM(S): 500 TABLET, FILM COATED ORAL at 12:00

## 2024-11-15 RX ADMIN — ACETAMINOPHEN 500MG 1000 MILLIGRAM(S): 500 TABLET, COATED ORAL at 05:55

## 2024-11-15 RX ADMIN — Medication 10 MILLIGRAM(S): at 08:53

## 2024-11-15 RX ADMIN — METHOCARBAMOL 500 MILLIGRAM(S): 500 TABLET, FILM COATED ORAL at 17:25

## 2024-11-15 RX ADMIN — Medication 88 MICROGRAM(S): at 05:55

## 2024-11-15 RX ADMIN — ACETAMINOPHEN 500MG 1000 MILLIGRAM(S): 500 TABLET, COATED ORAL at 21:16

## 2024-11-15 RX ADMIN — METHOCARBAMOL 500 MILLIGRAM(S): 500 TABLET, FILM COATED ORAL at 21:16

## 2024-11-15 NOTE — DISCHARGE NOTE PROVIDER - CARE PROVIDER_API CALL
Renetta Damian  Endocrinology/Metab/Diabetes  [address], [address]  [place] [address]  Phone: [phone]  Fax: [phone]  Follow Up Time: 2 weeks   Karri Solano.  Medical Oncology  [address] [facility], [address], [place] [address]  Phone: [phone]  Fax: [phone]  Follow Up Time: 2 weeks    JOSH RUIZ  [address] [place] [address]  Phone: [phone]  Fax: [phone]  Follow Up Time: 1 week    Alondra Yu  Spine Surgery  [address] [facility], [address] [place] [address]  Phone: [phone]  Fax: [phone]  Follow Up Time: 2 weeks    Baudilio Gillis  Neurosurgery  [address] [facility]  [place] [address]  Phone: [phone]  Fax: [phone]  Follow Up Time: 2 weeks

## 2024-11-15 NOTE — PROGRESS NOTE ADULT - SUBJECTIVE AND OBJECTIVE BOX
C A R D I O L O G Y  **********************************     DATE OF SERVICE: 11-15-24    Patient denies chest pain or shortness of breath.   Review of systems otherwise (-)  	  MEDICATIONS:  MEDICATIONS  (STANDING):  acetaminophen     Tablet .. 1000 milliGRAM(s) Oral every 8 hours  hydrocortisone 5 milliGRAM(s) Oral <User Schedule>  hydrocortisone 10 milliGRAM(s) Oral <User Schedule>  influenza   Vaccine 0.5 milliLiter(s) IntraMuscular once  levothyroxine 88 MICROGram(s) Oral daily  lidocaine   4% Patch 1 Patch Transdermal daily  methocarbamol 500 milliGRAM(s) Oral every 8 hours  sodium chloride 0.9%. 1000 milliLiter(s) (125 mL/Hr) IV Continuous <Continuous>  tenofovir disoproxil fumarate (VIREAD) 300 milliGRAM(s) Oral daily      LABS:	 	    CARDIAC MARKERS:        Troponin I, High Sensitivity Result: 4.6 ng/L (11-14-24 @ 11:53)  Troponin I, High Sensitivity Result: 5.3 ng/L (11-13-24 @ 15:56)                              12.4   10.15 )-----------( 104      ( 15 Nov 2024 05:46 )             36.0     Hemoglobin: 12.4 g/dL (11-15 @ 05:46)  Hemoglobin: 12.8 g/dL (11-14 @ 05:25)  Hemoglobin: 12.4 g/dL (11-13 @ 15:56)      11-15    141  |  109[H]  |  22[H]  ----------------------------<  94  4.0   |  28  |  1.31[H]    Ca    9.0      15 Nov 2024 05:46  Mg     2.4     11-13    TPro  6.6  /  Alb  3.8  /  TBili  0.7  /  DBili  x   /  AST  19  /  ALT  20  /  AlkPhos  69  11-14    Creatinine Trend: 1.31<--, 1.34<--, 1.37<--      PHYSICAL EXAM:  T(C): 37 (11-15-24 @ 13:39), Max: 37 (11-15-24 @ 13:39)  HR: 77 (11-15-24 @ 13:39) (62 - 92)  BP: 115/70 (11-15-24 @ 13:39) (99/57 - 117/75)  RR: 18 (11-15-24 @ 13:39) (17 - 18)  SpO2: 97% (11-15-24 @ 13:39) (95% - 100%)  Wt(kg): --  I&O's Summary        Gen: Appears well in NAD  HEENT:  (-)icterus (-)pallor  CV: N S1 S2 1/6 KELLY (+)2 Pulses B/l  Resp:  Clear to ausculatation B/L, normal effort  GI: (+) BS Soft, NT, ND  Lymph:  (-)Edema, (-)obvious lymphadenopathy  Skin: Warm to touch, Normal turgor  Psych: Appropriate mood and affect      TELEMETRY: 	sinus        ASSESSMENT/PLAN: 	64y  Male with history of 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

## 2024-11-15 NOTE — PROGRESS NOTE ADULT - PROBLEM SELECTOR PLAN 1
Troponin negative, ddimer wnl,   - CTH: A focus of increased density in the pituitary gland suggesting calcification. (similar to prior)  - Neuro consulted (consider MRI)  - cardio consulted  - follow up TTE   - if echo wnl plan for pharm stress in AM
Pt with acute low back pain which is somatic in nature due to syncopal episode and fall. CTAP demonstrates  an acute L1 vertebral body fracture. Ortho service consulted and recommends conservative management with TLSO while OOB.   Opioid pain recommendations   -  Contiue Oxycodone 5 mg PO q 4 hours PRN severe pain. Monitor for sedation/ respiratory depression.   Non-opioid pain recommendations   - Modify Robaxin to 500mg PO q 8hrs x 5 days. Monitor for sedation   - No NSAIDs due to REBECCA  - Continue Acetaminophen 1 gram PO q 8 hours x 4 days. Monitor LFTs  - Continue Lidoderm 4% patch daily. (12 hrs on/12 hrs off)  Bowel Regimen  - Continue Miralax 17G PO daily  - Continue Senna 2 tablets at bedtime for constipation  Mild pain (score 1-3)  - Non-pharmacological pain treatment recommendations  - Warm/ Cool packs PRN   - Repositioning extremity, elevation, imagery, relaxation, distraction.  - Physical therapy OOB if no contraindications   Recommendations discussed with primary team and RN.    *** Upon discharge can send patient home with 1 week supply of Robaxin 500mg PO q 8hrs PRN for muscle spasms, lidocaine patches, Acetaminophen 1000mg PO q 8hrs PRN for moderate pain. No opioids recommended upon DC for management of acute back pain ****
Troponin negative, ddimer wnl,   - CTH: A focus of increased density in the pituitary gland suggesting calcification. (similar to prior)  - Neuro consulted (consider MRI)  - cardio consulted  - TTE shows EF 62%  Stress shows    1.Normal myocardial perfusion scan, with no evidence of infarction or inducible ischemia.   2. Stress electrocardiogram: No significant ischemic ST segment changes.   3. The left ventricle is normal in function and normal in size. The post stress left ventricular EF is 83 %.   4. Normal left ventricular regional wall motion.

## 2024-11-15 NOTE — DISCHARGE NOTE PROVIDER - NSDCCPCAREPLAN_GEN_ALL_CORE_FT
PRINCIPAL DISCHARGE DIAGNOSIS  Diagnosis: Syncope  Assessment and Plan of Treatment: You presented to ED after having a syncopal episode with a fall.    You were followed by neurologist.  You underwent MRI of your brain which showed a sellar/suprasellar mass along the left superior aspect of the pituitary gland   You were followed by endocrinologist .  You were monitored on telemetry with continous heart monitoring.  You were followed by cardiologist and underwent nuclear sress test which was normal.  No abnormal findings on telemetry or on ECHO.  -Maintain hydration with plentiful fluid hydration (2liters or 8 glasses of water daily)  -Change poition slowly  -Follow up with outpatient neurology, endocrinology  -Follow up with outpatient cardiologist for event monitor      SECONDARY DISCHARGE DIAGNOSES  Diagnosis: Fracture of lumbar spine  Assessment and Plan of Treatment: You sustained a fall during your syncopal episode.  CT of your abdomen/pelvis shows acute fracture of lumbar spine level 1.  You were followed by ortho, recommended conservative management with no spinal surgery recommended.  -Pain mangement  -TLSO brace for comfort when out of bed  -Physical therapy recommends home PT  -Follow up with Dr. Yu in office at [phone]    Diagnosis: Paraseptal emphysema  Assessment and Plan of Treatment: you were followed by pulmonologist for emphysema seen on CT of your chest.  You were counselled regarding hazards of smoking and encouraged to quit.  You stated that you will think about quitting.  -Consider outpatient pulmonary follow up with pulmonary function tests    Diagnosis: REBECCA (acute kidney injury)  Assessment and Plan of Treatment:     Diagnosis: Fracture of lumbar vertebra  Assessment and Plan of Treatment:      PRINCIPAL DISCHARGE DIAGNOSIS  Diagnosis: Syncope  Assessment and Plan of Treatment: You presented to ED after having a syncopal episode with a fall.    You were followed by neurologist.  You underwent MRI of your brain which showed a stable sellar/suprasellar mass along the left superior aspect of the pituitary gland   You were followed by endocrinologist. Suspects your syncopal episodes are related to your hormonal imbalance-possible orthostatic events.  You were monitored on telemetry with continous heart monitoring.  You were followed by cardiologist and underwent nuclear sress test which was normal.  No abnormal findings on telemetry or on ECHO.  -Maintain hydration with plentiful fluid hydration (2liters or 8 glasses of water daily)  -Change poition slowly  -Follow up with outpatient neurology, endocrinology  -Follow up with outpatient cardiologist for event monitor      SECONDARY DISCHARGE DIAGNOSES  Diagnosis: Fracture of lumbar spine  Assessment and Plan of Treatment: You sustained a fall during your syncopal episode.  CT of your abdomen/pelvis shows acute fracture of lumbar spine level 1.  You were followed by ortho, recommended conservative management with no spinal surgery recommended.  -Pain mangement  -TLSO brace for comfort when out of bed  -Physical therapy recommends home PT  -Follow up with Dr. Yu in office at [phone]    Diagnosis: Paraseptal emphysema  Assessment and Plan of Treatment: you were followed by pulmonologist for emphysema seen on CT of your chest.  You were counselled regarding hazards of smoking and encouraged to quit.  You stated that you will think about quitting.  -Consider outpatient pulmonary follow up with pulmonary function tests    Diagnosis: Acute kidney injury superimposed on CKD  Assessment and Plan of Treatment: likely due to dehydration.  -Follow up with your outpatient nephrologist for continued kidney function monitoring  -Maintain adequate hydration at all times  -Avoid Non steroidal medications    Diagnosis: CLL (chronic lymphocytic leukemia)  Assessment and Plan of Treatment: continue management as prior to hospitalization

## 2024-11-15 NOTE — PROGRESS NOTE ADULT - PROBLEM SELECTOR PLAN 4
- CTH: A focus of increased density in the pituitary gland suggesting calcification. (similar to prior)  - pt has history of pit adenoma dx in 2020, follows outpt with Endocrinology per HIE  - c/w home med Hydrocortisone 10 mg in AM, 5 mg in PM  - cortisol level low   - Endo Dr. Damian consulted
- CTH: A focus of increased density in the pituitary gland suggesting calcification. (similar to prior)  - pt has history of pit adenoma dx in 2020, follows outpt with Endocrinology per HIE  - c/w home med Hydrocortisone 10 mg in AM, 5 mg in PM  - cortisol level low   - Endo Dr. Damian consulted

## 2024-11-15 NOTE — DISCHARGE NOTE PROVIDER - NSDCFUSCHEDAPPT_GEN_ALL_CORE_FT
[facility]  ENDOCRIN [address] [facility]  Scheduled Appointment: 12/04/2024     eVto Garza  [facility]  UROLOGY [address]  Scheduled Appointment: 11/20/2024    Aliya Bob  [facility]  ENDOCRIN [address]  Scheduled Appointment: 12/04/2024    Lester Schrader  [facility]  UROLOGY [address]  Scheduled Appointment: 12/10/2024     Aliya Bob  [facility]  ENDOCRIN [address] [facility]  Scheduled Appointment: 12/04/2024    Lester Schrader  [facility]  UROLOGY [address] [facility]  Scheduled Appointment: 12/10/2024

## 2024-11-15 NOTE — PHYSICAL THERAPY INITIAL EVALUATION ADULT - NSACTIVITYREC_GEN_A_PT
Patient and wife instructed with proper use of back brace; reviewed fall prevention and safety precautions.

## 2024-11-15 NOTE — PROGRESS NOTE ADULT - ASSESSMENT
Search Terms: Sergio Leija, 1960Search Date: 11/14/2024 09:42:03 AM  The Drug Utilization Report below displays all of the controlled substance prescriptions, if any, that your patient has filled in the last twelve months. The information displayed on this report is compiled from pharmacy submissions to the Department, and accurately reflects the information as submitted by the pharmacies.    This report was requested by: Hansa Johnson | Reference #: [id number]    There are no results for the search terms that you entered.        
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 (similar to prior)  CT spine: Acute L1 vertebral body fracture   Patient admitted to medicine for syncope workup and acute L1 fracture.   TTE shows EF 62%  Stress shows    1.Normal myocardial perfusion scan, with no evidence of infarction or inducible ischemia.   2. Stress electrocardiogram: No significant ischemic ST segment changes.   3. The left ventricle is normal in function and normal in size. The post stress left ventricular EF is 83 %.   4. Normal left ventricular regional wall motion.  PT rec's Home PT-DME commode and Rolling Walker ordered per CM     f/u MR head         
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 (similar to prior)  CT spine: Acute L1 vertebral body fracture   Patient admitted to medicine for syncope workup and acute L1 fracture.

## 2024-11-15 NOTE — PROGRESS NOTE ADULT - PROBLEM SELECTOR PLAN 11
Pt from home   pt rec's home PT DME ordered RW and commode   f/u MR head   f/u SW -family request emergency alert bracelet

## 2024-11-15 NOTE — PROGRESS NOTE ADULT - SUBJECTIVE AND OBJECTIVE BOX
NP Note discussed with  Primary Attending    Patient is a 64y old  Male who presents with a chief complaint of Syncope (15 Nov 2024 10:24)      INTERVAL HPI/OVERNIGHT EVENTS: no new complaints    MEDICATIONS  (STANDING):  acetaminophen     Tablet .. 1000 milliGRAM(s) Oral every 8 hours  hydrocortisone 10 milliGRAM(s) Oral <User Schedule>  hydrocortisone 5 milliGRAM(s) Oral <User Schedule>  influenza   Vaccine 0.5 milliLiter(s) IntraMuscular once  levothyroxine 88 MICROGram(s) Oral daily  lidocaine   4% Patch 1 Patch Transdermal daily  methocarbamol 500 milliGRAM(s) Oral every 8 hours  sodium chloride 0.9%. 1000 milliLiter(s) (125 mL/Hr) IV Continuous <Continuous>  tenofovir disoproxil fumarate (VIREAD) 300 milliGRAM(s) Oral daily    MEDICATIONS  (PRN):  melatonin 3 milliGRAM(s) Oral at bedtime PRN Insomnia  oxyCODONE    IR 5 milliGRAM(s) Oral every 4 hours PRN Severe Pain (7 - 10)      __________________________________________________  REVIEW OF SYSTEMS:    CONSTITUTIONAL: No fever,   EYES: no acute visual disturbances  NECK: No pain or stiffness  RESPIRATORY: No cough; No shortness of breath  CARDIOVASCULAR: No chest pain, no palpitations  GASTROINTESTINAL: No pain. No nausea or vomiting; No diarrhea   NEUROLOGICAL: No headache or numbness, no tremors  MUSCULOSKELETAL: No joint pain, no muscle pain  GENITOURINARY: no dysuria, no frequency, no hesitancy  PSYCHIATRY: no depression , no anxiety  ALL OTHER  ROS negative        Vital Signs Last 24 Hrs  T(C): 36.8 (15 Nov 2024 11:01), Max: 36.9 (14 Nov 2024 17:51)  T(F): 98.3 (15 Nov 2024 11:01), Max: 98.4 (14 Nov 2024 17:51)  HR: 92 (15 Nov 2024 11:01) (62 - 92)  BP: 117/75 (15 Nov 2024 11:01) (99/57 - 117/75)  BP(mean): 82 (14 Nov 2024 16:09) (82 - 82)  RR: 18 (15 Nov 2024 11:01) (17 - 18)  SpO2: 100% (15 Nov 2024 11:01) (95% - 100%)    Parameters below as of 15 Nov 2024 11:01  Patient On (Oxygen Delivery Method): room air        ________________________________________________  PHYSICAL EXAM:  GENERAL: NAD  HEENT: Normocephalic;  conjunctivae and sclerae clear; moist mucous membranes;   NECK : supple  CHEST/LUNG: Clear to auscultation bilaterally with good air entry   HEART: S1 S2  regular; no murmurs, gallops or rubs  ABDOMEN: Soft, Nontender, Nondistended; Bowel sounds present  EXTREMITIES: no cyanosis; no edema; no calf tenderness  SKIN: warm and dry; no rash  NERVOUS SYSTEM:  Awake and alert; Oriented  to place, person and time ; no new deficits    _________________________________________________  LABS:                        12.4   10.15 )-----------( 104      ( 15 Nov 2024 05:46 )             36.0     11-15    141  |  109[H]  |  22[H]  ----------------------------<  94  4.0   |  28  |  1.31[H]    Ca    9.0      15 Nov 2024 05:46  Mg     2.4     11-13    TPro  6.6  /  Alb  3.8  /  TBili  0.7  /  DBili  x   /  AST  19  /  ALT  20  /  AlkPhos  69  11-14    PT/INR - ( 13 Nov 2024 15:56 )   PT: 10.6 sec;   INR: 0.91 ratio         PTT - ( 13 Nov 2024 15:56 )  PTT:32.7 sec  Urinalysis Basic - ( 15 Nov 2024 05:46 )    Color: x / Appearance: x / SG: x / pH: x  Gluc: 94 mg/dL / Ketone: x  / Bili: x / Urobili: x   Blood: x / Protein: x / Nitrite: x   Leuk Esterase: x / RBC: x / WBC x   Sq Epi: x / Non Sq Epi: x / Bacteria: x      CAPILLARY BLOOD GLUCOSE            RADIOLOGY & ADDITIONAL TESTS:    Imaging Personally Reviewed:  YES/NO    Consultant(s) Notes Reviewed:   YES/ No    Care Discussed with Consultants :     Plan of care was discussed with patient and /or primary care giver; all questions and concerns were addressed and care was aligned with patient's wishes.

## 2024-11-15 NOTE — DISCHARGE NOTE PROVIDER - CARE PROVIDERS DIRECT ADDRESSES
[email or web address]

## 2024-11-15 NOTE — DISCHARGE NOTE PROVIDER - NSDCFUADDAPPT_GEN_ALL_CORE_FT
APPTS ARE READY TO BE MADE: [X] YES    Best Family or Patient Contact (if needed):    Additional Information about above appointments (if needed):    1: endocrinology - dr. carias  2: pcp - dr. gillespie  3: ortho - dr. landry  4. oncology - dr. de oliveira  5. neurosurgery - dr. peralta    Other comments or requests:      APPTS ARE READY TO BE MADE: [X] YES    Best Family or Patient Contact (if needed):    Additional Information about above appointments (if needed):    1: endocrinology - dr. carias  2: pcp - dr. gillespie  3: ortho - dr. landry  4. oncology - dr. de oliveira  5. neurosurgery - dr. peralta    Other comments or requests:     Prior to outreaching the patient, it was visible that the patient has secured a follow up appointment with Endocrinology with Dr. Bob on 12/4/2024 at 7:20am, [address] [place] [address], which was not scheduled by our team. APPTS ARE READY TO BE MADE: [X] YES    Best Family or Patient Contact (if needed):    Additional Information about above appointments (if needed):    1: endocrinology - dr. carias  2: pcp - dr. gillespie  3: ortho - dr. yu  4. oncology - dr. solano  5. neurosurgery - dr. gillis    Other comments or requests:     Prior to outreaching the patient, it was visible that the patient has secured a follow up appointment with Endocrinology with Dr. Bob on 12/4/2024 at 7:20am, [address] [facility]  [address], which was not scheduled by our team.    Patient informed us they already have secured a follow up appointment for Medical Oncology with Dr. Solano, which is not visible on [facility].     Patient informed us they already have secured a follow up appointment with Dr. Gillespie, which is not visible on [facility].     Appointment was scheduled by our team on the patient's behalf through the provider's office for Spine Surgery with Dr. Yu on 11/26/2024 at 9:30am, [address] [facility] [place] [address].     Appointment was scheduled in [facility] for Neurosurgery with Dr. Gillis on 2/6/2025 at 9am, [address] [address].

## 2024-11-15 NOTE — PHYSICAL THERAPY INITIAL EVALUATION ADULT - ADDITIONAL COMMENTS
Wife present during visit. States patient will be alone while she will be at work. Patient assessed to be steady with RW during ambulation and safe with transfers. Requested for RW for safe ambulation and commode to facilitate safe toilet transfers.   Patient and wife in agreement with [facility] recommendation.

## 2024-11-15 NOTE — PROGRESS NOTE ADULT - SUBJECTIVE AND OBJECTIVE BOX
Time of Visit:  Patient seen and examined. pat is bed . wife at bedside     MEDICATIONS  (STANDING):  acetaminophen     Tablet .. 1000 milliGRAM(s) Oral every 8 hours  hydrocortisone 5 milliGRAM(s) Oral <User Schedule>  hydrocortisone 10 milliGRAM(s) Oral <User Schedule>  influenza   Vaccine 0.5 milliLiter(s) IntraMuscular once  levothyroxine 88 MICROGram(s) Oral daily  lidocaine   4% Patch 1 Patch Transdermal daily  methocarbamol 500 milliGRAM(s) Oral every 8 hours  sodium chloride 0.9%. 1000 milliLiter(s) (125 mL/Hr) IV Continuous <Continuous>  tenofovir disoproxil fumarate (VIREAD) 300 milliGRAM(s) Oral daily      MEDICATIONS  (PRN):  melatonin 3 milliGRAM(s) Oral at bedtime PRN Insomnia  oxyCODONE    IR 5 milliGRAM(s) Oral every 4 hours PRN Severe Pain (7 - 10)       Medications up to date at time of exam.      PHYSICAL EXAMINATION:  Patient has no new complaints.  GENERAL: The patient  in no apparent distress.     Vital Signs Last 24 Hrs  T(C): 37 (15 Nov 2024 13:39), Max: 37 (15 Nov 2024 13:39)  T(F): 98.6 (15 Nov 2024 13:39), Max: 98.6 (15 Nov 2024 13:39)  HR: 77 (15 Nov 2024 13:39) (62 - 92)  BP: 115/70 (15 Nov 2024 13:39) (99/57 - 117/75)  BP(mean): 84 (15 Nov 2024 12:30) (84 - 84)  RR: 18 (15 Nov 2024 13:39) (18 - 18)  SpO2: 97% (15 Nov 2024 13:39) (95% - 100%)    Parameters below as of 15 Nov 2024 13:39  Patient On (Oxygen Delivery Method): room air       (if applicable)    Chest Tube (if applicable)    HEENT: Head is normocephalic and atraumatic. Extraocular muscles are intact. Mucous membranes are moist.     NECK: Supple, no palpable adenopathy.    LUNGS: Fair bilateral air entry   no wheezing, rales, or rhonchi.    HEART: Regular rate and rhythm without murmur.    ABDOMEN: Soft, nontender, and nondistended.  No hepatosplenomegaly is noted.    : No painful voiding, no pelvic pain    EXTREMITIES: Without any cyanosis, clubbing, rash, lesions or edema.    NEUROLOGIC: Awake, alert, oriented, grossly intact    SKIN: Warm, dry, good turgor.      LABS:                        12.4   10.15 )-----------( 104      ( 15 Nov 2024 05:46 )             36.0     11-15    141  |  109[H]  |  22[H]  ----------------------------<  94  4.0   |  28  |  1.31[H]    Ca    9.0      15 Nov 2024 05:46    TPro  6.6  /  Alb  3.8  /  TBili  0.7  /  DBili  x   /  AST  19  /  ALT  20  /  AlkPhos  69  11-14      Urinalysis Basic - ( 15 Nov 2024 05:46 )    Color: x / Appearance: x / SG: x / pH: x  Gluc: 94 mg/dL / Ketone: x  / Bili: x / Urobili: x   Blood: x / Protein: x / Nitrite: x   Leuk Esterase: x / RBC: x / WBC x   Sq Epi: x / Non Sq Epi: x / Bacteria: x        MICROBIOLOGY: (if applicable)    RADIOLOGY & ADDITIONAL STUDIES:  EKG:   CXR:  ECHO:    IMPRESSION: 64y Male PAST MEDICAL & SURGICAL HISTORY:  Hypercholesterolemia      CLL (chronic lymphocytic leukemia)      Pituitary adenoma      Hepatitis B      S/P appendectomy  10 years ago       p/w       Impression: This is a 63 Y/O male active smoker .  With CLL on oral chemotherapy, hepatitis B on Tenofovir. Presented to ED after a fall in his home with symptoms of Dizziness which was resolved at this time . Patient endorses lower back pain that is worse when he moves.  His symptoms of dizziness have since resolved.  Admitted to medicine for syncope workup and acute L1 fracture. For pulmonary evaluation due to showing CT Chest with Paraseptal Emphysema . No Pleural Effusion.     Suggestion:  - O2 saturation 97% room air. So far saturating good room air.  - Reinforced the importance of smoking cessation , not willing to quit, per pt he will think about it but will follow the hospital protocol of no smoking.   - Cardiology following for Syncope.  - No Lung work up for Emphysema at this time. Can benefit with outpatient PFT .  - Ortho eval   - Pt eval.  - Pain control     spoke with wife at bedside

## 2024-11-15 NOTE — PROGRESS NOTE ADULT - PROBLEM SELECTOR PLAN 5
TSH <0.01   -endo consulted Dr Damian   c/w LT4 88 mcg qd
TSH <0.01   -endo consulted Dr Damian   c/w LT4 88 mcg qd

## 2024-11-15 NOTE — PROGRESS NOTE ADULT - SUBJECTIVE AND OBJECTIVE BOX
Patient is a 64y old  Male who presents with a chief complaint of Syncope (14 Nov 2024 19:15)    PATIENT IS SEEN AND EXAMINED IN MEDICAL FLOOR.  NGT [    ]    BAUTISTA [   ]      GT [   ]    ALLERGIES:  No Known Allergies      Daily     Daily     VITALS:    Vital Signs Last 24 Hrs  T(C): 36.8 (15 Nov 2024 05:02), Max: 36.9 (14 Nov 2024 17:51)  T(F): 98.2 (15 Nov 2024 05:02), Max: 98.4 (14 Nov 2024 17:51)  HR: 62 (15 Nov 2024 05:02) (62 - 79)  BP: 104/70 (15 Nov 2024 05:02) (99/57 - 110/68)  BP(mean): 82 (14 Nov 2024 16:09) (82 - 82)  RR: 18 (15 Nov 2024 05:02) (17 - 18)  SpO2: 96% (15 Nov 2024 05:02) (95% - 98%)    Parameters below as of 15 Nov 2024 05:02  Patient On (Oxygen Delivery Method): room air        LABS:    CBC Full  -  ( 15 Nov 2024 05:46 )  WBC Count : 10.15 K/uL  RBC Count : 4.23 M/uL  Hemoglobin : 12.4 g/dL  Hematocrit : 36.0 %  Platelet Count - Automated : 104 K/uL  Mean Cell Volume : 85.1 fl  Mean Cell Hemoglobin : 29.3 pg  Mean Cell Hemoglobin Concentration : 34.4 g/dL  Auto Neutrophil # : x  Auto Lymphocyte # : x  Auto Monocyte # : x  Auto Eosinophil # : x  Auto Basophil # : x  Auto Neutrophil % : x  Auto Lymphocyte % : x  Auto Monocyte % : x  Auto Eosinophil % : x  Auto Basophil % : x    PT/INR - ( 13 Nov 2024 15:56 )   PT: 10.6 sec;   INR: 0.91 ratio         PTT - ( 13 Nov 2024 15:56 )  PTT:32.7 sec  11-15    141  |  109[H]  |  22[H]  ----------------------------<  94  4.0   |  28  |  1.31[H]    Ca    9.0      15 Nov 2024 05:46  Mg     2.4     11-13    TPro  6.6  /  Alb  3.8  /  TBili  0.7  /  DBili  x   /  AST  19  /  ALT  20  /  AlkPhos  69  11-14    CAPILLARY BLOOD GLUCOSE            LIVER FUNCTIONS - ( 14 Nov 2024 05:25 )  Alb: 3.8 g/dL / Pro: 6.6 g/dL / ALK PHOS: 69 U/L / ALT: 20 U/L DA / AST: 19 U/L / GGT: x           Creatinine Trend: 1.31<--, 1.34<--, 1.37<--  I&O's Summary              MEDICATIONS:    MEDICATIONS  (STANDING):  acetaminophen     Tablet .. 1000 milliGRAM(s) Oral every 8 hours  hydrocortisone 10 milliGRAM(s) Oral <User Schedule>  hydrocortisone 5 milliGRAM(s) Oral <User Schedule>  influenza   Vaccine 0.5 milliLiter(s) IntraMuscular once  levothyroxine 88 MICROGram(s) Oral daily  lidocaine   4% Patch 1 Patch Transdermal daily  sodium chloride 0.9%. 1000 milliLiter(s) (125 mL/Hr) IV Continuous <Continuous>  tenofovir disoproxil fumarate (VIREAD) 300 milliGRAM(s) Oral daily      MEDICATIONS  (PRN):  melatonin 3 milliGRAM(s) Oral at bedtime PRN Insomnia  methocarbamol 500 milliGRAM(s) Oral every 8 hours PRN Muscle Spasm  oxyCODONE    IR 5 milliGRAM(s) Oral every 4 hours PRN Severe Pain (7 - 10)      REVIEW OF SYSTEMS:                           ALL ROS DONE [ X   ]    CONSTITUTIONAL:  LETHARGIC [   ], FEVER [   ], UNRESPONSIVE [   ]  CVS:  CP  [   ], SOB, [   ], PALPITATIONS [   ], DIZZYNESS [   ]  RS: COUGH [   ], SPUTUM [   ]  GI: ABDOMINAL PAIN [   ], NAUSEA [   ], VOMITINGS [   ], DIARRHEA [   ], CONSTIPATION [   ]  :  DYSURIA [   ], NOCTURIA [   ], INCREASED FREQUENCY [   ], DRIBLING [   ],  SKELETAL: PAINFUL JOINTS [   ], SWOLLEN JOINTS [   ], NECK ACHE [   ], LOW BACK ACHE [   ],  SKIN : ULCERS [   ], RASH [   ], ITCHING [   ]  CNS: HEAD ACHE [   ], DOUBLE VISION [   ], BLURRED VISION [   ], AMS / CONFUSION [   ], SEIZURES [   ], WEAKNESS [   ],TINGLING / NUMBNESS [   ]      PHYSICAL EXAMINATION:  GENERAL APPEARANCE: NO DISTRESS  HEENT:  NO PALLOR, NO  JVD,  NO   NODES, NECK SUPPLE  CVS: S1 +, S2 +,   RS: AEEB,  OCCASIONAL  RALES +,   NO RONCHI  ABD: SOFT, NT, NO, BS +  EXT: NO PE  SKIN: WARM,   SKELETAL:  ROM ACCEPTABLE  CNS:  AAO X    ,   DEFICITS    RADIOLOGY :      ASSESSMENT :     Syncope and collapse    Hypercholesterolemia    HTN (hypertension)    CLL (chronic lymphocytic leukemia)    Pituitary adenoma    Hepatitis B    S/P appendectomy        PLAN:  HPI:  Pt is a 64-year-old male with 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 Wife at bedside also provided collateral information. Patient was feeling well all morning, and in the afternoon at 1 pm when walking to the bathroom he suddenly felt flushed and dizzy, had blurry vision and syncopized on the floor.  Patient does not remember very well regarding how he ended up on the floor or for how long he was on the floor.  When he woke up, he called his family who arrived to help him.  By the time his family came patient was able to stand and his dizziness improved.  After the fall patient states he felt diaphoretic  and was mildly confused. When he fell, patient hit his back on the floor. He denies any LOC, head trauma. Patient has a history of migraines but states they usually do not present this way or this suddenly and acutely.  In the ED, patient initially complained of sharp 10 out of 10 mid- lower back pain that is exacerbated by even minimal movement.  He is able to ambulate and move all extremities with no issues.  Denies any saddle anesthesia,  fecal or urinary incontinence, tingling in his lower extremities or radicular pain.  patient denies any symptoms of recent illness, nausea, vomiting, diarrhea, or fever.  Patient has previously had a similar episode occur 4 years ago in 2020.  At this time he came to the hospital and was found to have a pituitary adenoma for which he regularly follows with endocrinology Dr. Jenkins outpatient.  Patient is currently taking daily hydrocortisone and levothyroxine. On my exam in ED, patient endorses lower back pain that is worse when he moves.  His symptoms of dizziness have since resolved.  Patient admitted to medicine for syncope workup and acute L1 fracture.          (13 Nov 2024 21:41)    #  CASE D/W WIFE AT BEDSIDE. ALL QUESTIONS ANSWERED    # SYNCOPE  - TELEMETRY  - F/U ORTHOSTATIC VITALS  - F/U ECHO  - NOTED CT HEAD  - F/U PT EVAL  - F/U MRI PITUITARY  - CARDIOLOGY CONSULT  - NEUROLOGY CONSULT    # HX OF PANHYPOPITUITARISM [PITUITARY ADENOMA]  # CENTAL HYPOTHYROIDISM  # HYPERPROLACTINEMIA  # SECONDARY ADRENAL INSUFFICIENCY  # SECONDARY HYPOGONADISM  - F/U TSH  - F/U CORTISOL    - LEVOTHYROXINE  - ON HYDROCORTISONE  - F/U MRI PITUITARY  - ENDOCRINOLOGY CONSULT    # BACK PAIN S/T ACUTE LUMBAR FRACTURE  - NOTED CT L SPINE  - PRN PAIN CONTROL  - F/U PT EVAL  - ORTHOSPINE CONSULT    # REBECCA VS. CKD  - F/U UA  - F/U PVR  - MONITOR CR  - AVOID NEPHROTOXIC AGENTS    # CLL ON ORAL CHEMOTHERAPY    # HEPATITIS B ON TENOFOVIR    # HX OF VESTIBULAR MIGRAINE    # VITAMIN D INSUFFICIENCY  - F/U 25OH VIT D  - SUPPLEMENT    # HYPERLIPIDEMIA  - F/U LIPID PANEL    # PRE-DM  - F/U HBA1C    # SMOKER  - COUNSELLED ON CESSATION > 10 MINS    # GI AND DVT PPX .     Patient is a 64y old  Male who presents with a chief complaint of Syncope (14 Nov 2024 19:15)    PATIENT IS SEEN AND EXAMINED IN MEDICAL FLOOR.      ALLERGIES:  No Known Allergies      VITALS:    Vital Signs Last 24 Hrs  T(C): 36.8 (15 Nov 2024 05:02), Max: 36.9 (14 Nov 2024 17:51)  T(F): 98.2 (15 Nov 2024 05:02), Max: 98.4 (14 Nov 2024 17:51)  HR: 62 (15 Nov 2024 05:02) (62 - 79)  BP: 104/70 (15 Nov 2024 05:02) (99/57 - 110/68)  BP(mean): 82 (14 Nov 2024 16:09) (82 - 82)  RR: 18 (15 Nov 2024 05:02) (17 - 18)  SpO2: 96% (15 Nov 2024 05:02) (95% - 98%)    Parameters below as of 15 Nov 2024 05:02  Patient On (Oxygen Delivery Method): room air        LABS:    CBC Full  -  ( 15 Nov 2024 05:46 )  WBC Count : 10.15 K/uL  RBC Count : 4.23 M/uL  Hemoglobin : 12.4 g/dL  Hematocrit : 36.0 %  Platelet Count - Automated : 104 K/uL  Mean Cell Volume : 85.1 fl  Mean Cell Hemoglobin : 29.3 pg  Mean Cell Hemoglobin Concentration : 34.4 g/dL  Auto Neutrophil # : x  Auto Lymphocyte # : x  Auto Monocyte # : x  Auto Eosinophil # : x  Auto Basophil # : x  Auto Neutrophil % : x  Auto Lymphocyte % : x  Auto Monocyte % : x  Auto Eosinophil % : x  Auto Basophil % : x    PT/INR - ( 13 Nov 2024 15:56 )   PT: 10.6 sec;   INR: 0.91 ratio         PTT - ( 13 Nov 2024 15:56 )  PTT:32.7 sec  11-15    141  |  109[H]  |  22[H]  ----------------------------<  94  4.0   |  28  |  1.31[H]    Ca    9.0      15 Nov 2024 05:46  Mg     2.4     11-13    TPro  6.6  /  Alb  3.8  /  TBili  0.7  /  DBili  x   /  AST  19  /  ALT  20  /  AlkPhos  69  11-14    CAPILLARY BLOOD GLUCOSE            LIVER FUNCTIONS - ( 14 Nov 2024 05:25 )  Alb: 3.8 g/dL / Pro: 6.6 g/dL / ALK PHOS: 69 U/L / ALT: 20 U/L DA / AST: 19 U/L / GGT: x           Creatinine Trend: 1.31<--, 1.34<--, 1.37<--  I&O's Summary              MEDICATIONS:    MEDICATIONS  (STANDING):  acetaminophen     Tablet .. 1000 milliGRAM(s) Oral every 8 hours  hydrocortisone 10 milliGRAM(s) Oral <User Schedule>  hydrocortisone 5 milliGRAM(s) Oral <User Schedule>  influenza   Vaccine 0.5 milliLiter(s) IntraMuscular once  levothyroxine 88 MICROGram(s) Oral daily  lidocaine   4% Patch 1 Patch Transdermal daily  sodium chloride 0.9%. 1000 milliLiter(s) (125 mL/Hr) IV Continuous <Continuous>  tenofovir disoproxil fumarate (VIREAD) 300 milliGRAM(s) Oral daily      MEDICATIONS  (PRN):  melatonin 3 milliGRAM(s) Oral at bedtime PRN Insomnia  methocarbamol 500 milliGRAM(s) Oral every 8 hours PRN Muscle Spasm  oxyCODONE    IR 5 milliGRAM(s) Oral every 4 hours PRN Severe Pain (7 - 10)      REVIEW OF SYSTEMS:                           ALL ROS DONE [ X   ]    CONSTITUTIONAL:  LETHARGIC [   ], FEVER [   ], UNRESPONSIVE [   ]  CVS:  CP  [   ], SOB, [   ], PALPITATIONS [   ], DIZZYNESS [   ]  RS: COUGH [   ], SPUTUM [   ]  GI: ABDOMINAL PAIN [   ], NAUSEA [   ], VOMITINGS [   ], DIARRHEA [   ], CONSTIPATION [   ]  :  DYSURIA [   ], NOCTURIA [   ], INCREASED FREQUENCY [   ], DRIBLING [   ],  SKELETAL: PAINFUL JOINTS [   ], SWOLLEN JOINTS [   ], NECK ACHE [   ], LOW BACK ACHE [   ],  SKIN : ULCERS [   ], RASH [   ], ITCHING [   ]  CNS: HEAD ACHE [   ], DOUBLE VISION [   ], BLURRED VISION [   ], AMS / CONFUSION [   ], SEIZURES [   ], WEAKNESS [   ],TINGLING / NUMBNESS [   ]      PHYSICAL EXAMINATION:  GENERAL APPEARANCE: NO DISTRESS  HEENT:  NO PALLOR, NO  JVD,  NO   NODES, NECK SUPPLE  CVS: S1 +, S2 +,   RS: AEEB,  OCCASIONAL  RALES +,   NO RONCHI  ABD: SOFT, NT, NO, BS +  EXT: NO PE  SKIN: WARM,   SKELETAL:  ROM ACCEPTABLE  CNS:  AAO X 3      RADIOLOGY :  RADIOLOGY AND READINGS REVIEWED      ASSESSMENT :     Syncope and collapse    Hypercholesterolemia    HTN (hypertension)    CLL (chronic lymphocytic leukemia)    Pituitary adenoma    Hepatitis B    S/P appendectomy        PLAN:  HPI:  Pt is a 64-year-old male with 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 Wife at bedside also provided collateral information. Patient was feeling well all morning, and in the afternoon at 1 pm when walking to the bathroom he suddenly felt flushed and dizzy, had blurry vision and syncopized on the floor.  Patient does not remember very well regarding how he ended up on the floor or for how long he was on the floor.  When he woke up, he called his family who arrived to help him.  By the time his family came patient was able to stand and his dizziness improved.  After the fall patient states he felt diaphoretic  and was mildly confused. When he fell, patient hit his back on the floor. He denies any LOC, head trauma. Patient has a history of migraines but states they usually do not present this way or this suddenly and acutely.  In the ED, patient initially complained of sharp 10 out of 10 mid- lower back pain that is exacerbated by even minimal movement.  He is able to ambulate and move all extremities with no issues.  Denies any saddle anesthesia,  fecal or urinary incontinence, tingling in his lower extremities or radicular pain.  patient denies any symptoms of recent illness, nausea, vomiting, diarrhea, or fever.  Patient has previously had a similar episode occur 4 years ago in 2020.  At this time he came to the hospital and was found to have a pituitary adenoma for which he regularly follows with endocrinology Dr. Jenkins outpatient.  Patient is currently taking daily hydrocortisone and levothyroxine. On my exam in ED, patient endorses lower back pain that is worse when he moves.  His symptoms of dizziness have since resolved.  Patient admitted to medicine for syncope workup and acute L1 fracture.          (13 Nov 2024 21:41)    #  CASE D/W WIFE AT BEDSIDE. ALL QUESTIONS ANSWERED    # SYNCOPE  - TELEMETRY  - F/U ORTHOSTATIC VITALS  - ECHO - LV SYSTOLIC EF - 62%  - NST - NORMAL MYOCARDIAL PERFUSION SCAN W/ NO EVIDENCE OF INFARCTION OR INDUCIBLE ISCHEMIA  - NOTED CT HEAD  - F/U PT EVAL  - F/U MRI PITUITARY  - CARDIOLOGY CONSULT  - NEUROLOGY CONSULT    # HX OF PANHYPOPITUITARISM [PITUITARY ADENOMA]  # CENTAL HYPOTHYROIDISM  # HYPERPROLACTINEMIA  # SECONDARY ADRENAL INSUFFICIENCY  # SECONDARY HYPOGONADISM  - NOTED TSH, F/U FT3 AND FT4  - F/U CORTISOL    - LEVOTHYROXINE  - ON HYDROCORTISONE  - F/U MRI PITUITARY  - ENDOCRINOLOGY CONSULT    # BACK PAIN S/T ACUTE LUMBAR FRACTURE  - NOTED CT L SPINE  - PRN PAIN CONTROL  - F/U PT EVAL  - ORTHOSPINE CONSULT    # REBECCA ON CKD  - F/U UA  - F/U PVR  - MONITOR CR  - AVOID NEPHROTOXIC AGENTS    # CLL ON ORAL CHEMOTHERAPY    # HEPATITIS B ON TENOFOVIR    # HX OF VESTIBULAR MIGRAINE    # VITAMIN D INSUFFICIENCY  - F/U 25OH VIT D  - SUPPLEMENT    # HYPERLIPIDEMIA  - F/U LIPID PANEL    # PRE-DM  - F/U HBA1C    # SMOKER  - COUNSELLED ON CESSATION > 10 MINS    # GI AND DVT PPX .

## 2024-11-15 NOTE — DISCHARGE NOTE PROVIDER - HOSPITAL COURSE
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 (similar to prior)  CT spine: Acute L1 vertebral body fracture   Patient admitted to medicine for syncope workup and acute L1 fracture.   MR brain shows ++++++  Nuclear stress test shows +++++++  ECHO shows++++++  PT rec's Home PT    Please note that this a brief summary of hospital course please refer to daily progress notes and consult notes for full course and events. Patient seen and examined at bedside, discussed with medical attending. Patient medically cleared for discharge to home.     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 (similar to prior)  CT spine: Acute L1 vertebral body fracture   Patient admitted to medicine for syncope workup and acute L1 compression fracture.   MR brain shows ++++++  Nuclear stress test shows +++++++  ECHO shows++++++  PT rec's Home PT  < from: MR Head w/wo IV Cont (11.15.24 @ 16:42) >        < end of copied text >      Please note that this a brief summary of hospital course please refer to daily progress notes and consult notes for full course and events. Patient seen and examined at bedside, discussed with medical attending. Patient medically cleared for discharge to home.     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 (similar to prior)  CT spine: Acute L1 vertebral body fracture   MRI brain:  Calcified sellar/suprasellar mass   Patient admitted to medicine for syncope workup and acute L1 compression fracture.     Pt. followed by neuro, concern for sequelae of panhypopituitarism, recc plentiful fluid hydration (2 liters, or 8 glasses, of water daily).  Pt. to maintain caution with rapid changes in position.    Pt. 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 Patient seen and examined at bedside, discussed with medical attending. Patient medically cleared for discharge to home.     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 (similar to prior)  CT spine: Acute L1 vertebral body fracture   MRI brain:  Calcified sellar/suprasellar mass   Patient admitted to medicine for syncope workup and acute L1 compression fracture.     Pt. followed by neuro, concern for sequelae of panhypopituitarism, recc plentiful fluid hydration (2 liters, or 8 glasses, of water daily).  Pt. to maintain caution with rapid changes in position.    Pt. followed by cardio, underwent ECHO and nuclear stress test which were normal.  Recommended OP f/u with event monitor    Pt. 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 is medically optimized for discharge to home with OP f/u    Please note that this a brief summary of hospital course please refer to daily progress notes and consult notes for full course and events. Patient seen and examined at bedside, discussed with medical attending. Patient medically cleared for discharge to home.

## 2024-11-15 NOTE — PHYSICAL THERAPY INITIAL EVALUATION ADULT - PERTINENT HX OF CURRENT PROBLEM, REHAB EVAL
General Patient admitted from home due to a fall while in the bathroom; injuring his low back. Radiograph show fracture, however MD recommend conservative treatment, with use of back brace during ambulation. Instructions not to ambulate patient with severe pain.

## 2024-11-15 NOTE — PROGRESS NOTE ADULT - PROBLEM SELECTOR PLAN 3
-BUN/Cr elevated,   -likely pre-renal REBECCA  -continue IV fluids   -monitor renal fx
-BUN/Cr elevated,   -likely pre-renal REBECCA  -continue IV fluids   -monitor renal fx

## 2024-11-15 NOTE — PROGRESS NOTE ADULT - SUBJECTIVE AND OBJECTIVE BOX
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 Wife at bedside also provided collateral information. Patient was feeling well all morning, and in the afternoon at 1 pm when walking to the bathroom he suddenly felt flushed and dizzy, had blurry vision and syncopized on the floor.  Patient does not remember very well regarding how he ended up on the floor or for how long he was on the floor.  When he woke up, he called his family who arrived to help him.  By the time his family came patient was able to stand and his dizziness improved.  After the fall patient states he felt diaphoretic  and was mildly confused. When he fell, patient hit his back on the floor. He denies any LOC, head trauma. Patient has a history of migraines but states they usually do not present this way or this suddenly and acutely.  In the ED, patient initially complained of sharp 10 out of 10 mid- lower back pain that is exacerbated by even minimal movement.  He is able to ambulate and move all extremities with no issues.  Denies any saddle anesthesia,  fecal or urinary incontinence, tingling in his lower extremities or radicular pain.  patient denies any symptoms of recent illness, nausea, vomiting, diarrhea, or fever.  Patient has previously had a similar episode occur 4 years ago in 2020.  At this time he came to the hospital and was found to have a pituitary adenoma for which he regularly follows with endocrinology Dr. Jenkins outpatient.  Patient is currently taking daily hydrocortisone and levothyroxine. On my exam in ED, patient endorses lower back pain that is worse when he moves.  His symptoms of dizziness have since resolved.  Patient admitted to medicine for syncope workup and acute L1 fracture.  (13 Nov 2024 21:41)    Pt is admitted for acute back pain post syncopal episode. CTAP demonstrates an acute L1 vertebral body fracture. Pain service consulted 11/14 for management of back pain. Per patient, he felt dizzy while walking to the bathroom and fell. He recalls a similar episode 4 years ago.   Pt seen and examined this morning. At time of assessment, patient laying in bed in the presence of his wife, reports pain score 5/10 and improved SCALE USED: (1-10 VNRS).  Pt describes pain as localized to left low back constant, aching, and throbbing in quality. The pain is alleviated by pain medication and exacerbated by movement. He denies bowel/bladder dysfunction. Pt tolerating PO diet. Denies lethargy, chest pain, SOB, nausea, vomiting, constipation. Reports last BM 11/13. Patient stated goal for pain control: to be able to take deep breaths, get out of bed to chair and ambulate with tolerable pain control. Pt denies taking medications for pain at home.     PAST MEDICAL & SURGICAL HISTORY:  Hypercholesterolemia    CLL (chronic lymphocytic leukemia)    Pituitary adenoma    Hepatitis B    S/P appendectomy  10 years ago    FAMILY HISTORY:    Social History:  lives at home with wife, works part time (13 Nov 2024 21:41)  [x] Denies ETOH use, illicit drug use, current everyday cigarette smoker (3 cigarettes per day)    Allergies    No Known Allergies    Intolerances    MEDICATIONS  (STANDING):  acetaminophen     Tablet .. 1000 milliGRAM(s) Oral every 8 hours  hydrocortisone 10 milliGRAM(s) Oral <User Schedule>  hydrocortisone 5 milliGRAM(s) Oral <User Schedule>  influenza   Vaccine 0.5 milliLiter(s) IntraMuscular once  levothyroxine 88 MICROGram(s) Oral daily  lidocaine   4% Patch 1 Patch Transdermal daily  methocarbamol 500 milliGRAM(s) Oral every 8 hours  sodium chloride 0.9%. 1000 milliLiter(s) (125 mL/Hr) IV Continuous <Continuous>  tenofovir disoproxil fumarate (VIREAD) 300 milliGRAM(s) Oral daily    MEDICATIONS  (PRN):  melatonin 3 milliGRAM(s) Oral at bedtime PRN Insomnia  oxyCODONE    IR 5 milliGRAM(s) Oral every 4 hours PRN Severe Pain (7 - 10)    Vital Signs Last 24 Hrs  T(C): 36.8 (15 Nov 2024 11:01), Max: 36.9 (14 Nov 2024 17:51)  T(F): 98.3 (15 Nov 2024 11:01), Max: 98.4 (14 Nov 2024 17:51)  HR: 92 (15 Nov 2024 11:01) (62 - 92)  BP: 117/75 (15 Nov 2024 11:01) (99/57 - 117/75)  BP(mean): 82 (14 Nov 2024 16:09) (82 - 82)  RR: 18 (15 Nov 2024 11:01) (17 - 18)  SpO2: 100% (15 Nov 2024 11:01) (95% - 100%)    Parameters below as of 15 Nov 2024 11:01  Patient On (Oxygen Delivery Method): room air    LABS: Reviewed                          12.4   10.15 )-----------( 104      ( 15 Nov 2024 05:46 )             36.0     11-15    141  |  109[H]  |  22[H]  ----------------------------<  94  4.0   |  28  |  1.31[H]    Ca    9.0      15 Nov 2024 05:46  Mg     2.4     11-13    TPro  6.6  /  Alb  3.8  /  TBili  0.7  /  DBili  x   /  AST  19  /  ALT  20  /  AlkPhos  69  11-14    PT/INR - ( 13 Nov 2024 15:56 )   PT: 10.6 sec;   INR: 0.91 ratio      PTT - ( 13 Nov 2024 15:56 )  PTT:32.7 sec  LIVER FUNCTIONS - ( 14 Nov 2024 05:25 )  Alb: 3.8 g/dL / Pro: 6.6 g/dL / ALK PHOS: 69 U/L / ALT: 20 U/L DA / AST: 19 U/L / GGT: x           Urinalysis Basic - ( 15 Nov 2024 05:46 )    Color: x / Appearance: x / SG: x / pH: x  Gluc: 94 mg/dL / Ketone: x  / Bili: x / Urobili: x   Blood: x / Protein: x / Nitrite: x   Leuk Esterase: x / RBC: x / WBC x   Sq Epi: x / Non Sq Epi: x / Bacteria: x    CAPILLARY BLOOD GLUCOSE    Radiology: Reviewed.   < from: CT Abdomen and Pelvis w/ IV Cont (11.13.24 @ 18:28) >    ACC: [id number] EXAM:  CT ABDOMEN AND PELVIS IC   ORDERED BY: BLANCA PEÑA     ACC: [id number] EXAM:  CT CHEST IC   ORDERED BY: BLANCA PEÑA     PROCEDURE DATE:  11/13/2024      INTERPRETATION:  CLINICAL INFORMATION: Left mid back pain    COMPARISON: CT chest March 18, 2020, CT abdomen/pelvis March 17, 2020    CONTRAST/COMPLICATIONS:  IV Contrast: Omnipaque 350 (accession [id number]), IV contrast documented   in unlinked concurrent exam (accession [id number])  90 cc administered   10   cc discarded  Oral Contrast: NONE  None reported at time of study completion    PROCEDURE:  CT of the Chest, Abdomen and Pelvis was performed.  Sagittal and coronal reformats were performed.    FINDINGS:  CHEST:  LUNGS AND LARGE AIRWAYS: Patent central airways.Paraseptal emphysema and   biapical scar.  PLEURA: No pleural effusion.  VESSELS: Within normal limits.  HEART: Heart size is normal. No pericardial effusion.  MEDIASTINUM AND TAVO: No lymphadenopathy.  CHEST WALL AND LOWER NECK: Within normal limits.    ABDOMEN AND PELVIS:  LIVER: Subcentimeter hypoattenuating right lobe lesion which is too small   to characterize.  BILE DUCTS: Normal caliber.  GALLBLADDER: Cholelithiasis.  SPLEEN: Mildly enlarged, measuring 13.4 cm.  PANCREAS: Within normal limits.  ADRENALS: Within normal limits.  KIDNEYS/URETERS: Within normal limits.    BLADDER: Within normal limits.  REPRODUCTIVE ORGANS: Prostate within normal limits.    BOWEL: No bowel obstruction. Appendix not visualized. Colonic   diverticulosis without diverticulitis.  PERITONEUM/RETROPERITONEUM: Within normal limits.  VESSELS: Atherosclerotic changes.  LYMPH NODES: No lymphadenopathy.  ABDOMINAL WALL: Within normal limits.  BONES: Acute fracture of the L1 vertebral body with mild compression   deformity of the superior endplate and no retropulsion.    IMPRESSION:  Acute L1 vertebral body fracture as described.    --- End of Report ---    GEETA TOLENTINO MD; Attending Radiologist  This document has been electronically signed. Nov 13 2024  6:45PM    < end of copied text >    ORT Score -   Family Hx of substance abuse	Female	      Male  Alcohol 	                                           1                     3  Illegal drugs	                                   2                     3  Rx drugs                                           4 	                  4  Personal Hx of substance abuse		  Alcohol 	                                          3	                  3  Illegal drugs                                     4	                  4  Rx drugs                                            5 	                  5  Age between 16- 45 years	           1                     1  hx preadolescent sexual abuse	   3 	                  0  Psychological disease		  ADD, OCD, bipolar, schizophrenia   2	          2  Depression                                           1 	          1  Total: 0    a score of 3 or lower indicates low risk for opioid abuse		  a score of 4-7 indicates moderate risk for opioid abuse		  a score of 8 or higher indicates high risk for opioid abuse  	  REVIEW OF SYSTEMS:  CONSTITUTIONAL: No fever or fatigue  HEENT:  No difficulty hearing, no change in vision  NECK: No pain or stiffness  RESPIRATORY: No cough, wheezing, chills or hemoptysis; No shortness of breath  CARDIOVASCULAR: No chest pain, palpitations, dizziness, or leg swelling  GASTROINTESTINAL: No loss of appetite, decreased PO intake. No abdominal or epigastric pain. No nausea, vomiting; No diarrhea or constipation.   GENITOURINARY: No dysuria, frequency, hematuria, retention or incontinence  MUSCULOSKELETAL: No joint pain or swelling; + low back pain, no upper or lower motor strength weakness, no saddle anesthesia, bowel/bladder incontinence, + fall  NEURO: No headaches, No numbness/tingling b/l LE, No weakness  ENDOCRINE: No polyuria, polydipsia, heat or cold intolerance; No hair loss  PSYCHIATRIC: No depression, anxiety or difficulty sleeping    PHYSICAL EXAM:  GENERAL:  Alert & Oriented X4, cooperative, NAD, Good concentration. Speech is clear.   RESPIRATORY: Respirations even and unlabored. Clear to auscultation bilaterally  CARDIOVASCULAR: Normal S1/S2, regular rate and rhythm  GASTROINTESTINAL:  Soft, Nontender, Nondistended; Bowel sounds present  PERIPHERAL VASCULAR:  Extremities warm without edema. 2+ Peripheral Pulses, No cyanosis, No calf tenderness  MUSCULOSKELETAL: Motor Strength 5/5 B/L upper and lower extremities; moves all extremities equally against gravity; decreased ROM due to pain, negative SLR; + lumbar paraspinal tenderness due to pain  SKIN: Warm, dry, intact.     Risk factors associated with adverse outcomes related to opioid treatment  [ ] Concurrent benzodiazepine use  [ ] History/ Active substance use or alcohol use disorder  [ ] Psychiatric co-morbidity  [ ] Sleep apnea  [ ] COPD  [ ] BMI> 35  [ ] Liver dysfunction  [x] Renal dysfunction  [ ] CHF  [x] Smoker  [x] Age > 60 years    [x]  [place]  Reviewed and Copied to Chart. See below.    Plan of care and goal oriented pain management treatment options were discussed with patient and /or primary care giver; all questions and concerns were addressed and care was aligned with patient's wishes.    Educated patient on goal oriented pain management treatment options

## 2024-11-15 NOTE — PROGRESS NOTE ADULT - PROBLEM SELECTOR PLAN 2
L1 acute lumbar fracture noted on CT spine  p/w LBP worse with exertion  pain control PRN- Tylenol, Percocet, Morphine  pain management consulted  Ortho-spine consulted   Conservative treatment, no acute orthopedic spinal surgery recommended at this time  - TLSO brace, for comfort OOB, does not need brace while in bed   - PT eval   - WBAT of the lower extremities.   - Follow up with Dr. Yu in office at [phone]
L1 acute lumbar fracture noted on CT spine  p/w LBP worse with exertion  pain control PRN- Tylenol, Percocet, Morphine  pain management consulted  Ortho-spine consulted   Conservative treatment, no acute orthopedic spinal surgery recommended at this time  - TLSO brace, for comfort OOB, does not need brace while in bed   - PT eval- come PT   - WBAT of the lower extremities.   - Follow up with Dr. Yu in office at [phone]

## 2024-11-15 NOTE — PHYSICAL THERAPY INITIAL EVALUATION ADULT - GENERAL OBSERVATIONS, REHAB EVAL
Patient received in supine, AxOX3, reports back pain 7/10; w/ IV access on RUE; patient has brace at bedside. Patient's wife present during visit.

## 2024-11-15 NOTE — DISCHARGE NOTE PROVIDER - NSDCMRMEDTOKEN_GEN_ALL_CORE_FT
Calquence 100 mg oral capsule: 1 tab(s) orally 2 times a day  hydrocortisone 5 mg oral tablet: 2 tab(s) orally at 8am and 1 tab orally at 4pm  levothyroxine 88 mcg (0.088 mg) oral tablet: 1 tab(s) orally once a day  Viread 300 mg oral tablet: 1 tab(s) orally once a day   acetaminophen 500 mg oral tablet: 2 tab(s) orally every 8 hours  Calquence 100 mg oral capsule: 1 tab(s) orally 2 times a day  hydrocortisone 5 mg oral tablet: 2 tab(s) orally at 8am and 1 tab orally at 4pm  levothyroxine 88 mcg (0.088 mg) oral tablet: 1 tab(s) orally once a day  lidocaine 4% topical film: Apply topically to affected area once a day  methocarbamol 500 mg oral tablet: 1 tab(s) orally every 8 hours  pantoprazole 40 mg oral delayed release tablet: 1 tab(s) orally once a day (before a meal)  polyethylene glycol 3350 oral powder for reconstitution: 17 gram(s) orally once a day  senna leaf extract oral tablet: 2 tab(s) orally once a day (at bedtime)  Viread 300 mg oral tablet: 1 tab(s) orally once a day

## 2024-11-15 NOTE — DISCHARGE NOTE PROVIDER - PROVIDER TOKENS
PROVIDER:[TOKEN:[[id number]],FOLLOWUP:[2 weeks]] PROVIDER:[TOKEN:[[id number]],FOLLOWUP:[2 weeks]],PROVIDER:[TOKEN:[[id number]],FOLLOWUP:[1 week]],PROVIDER:[TOKEN:[[id number]],FOLLOWUP:[2 weeks]],PROVIDER:[TOKEN:[[id number]],FOLLOWUP:[2 weeks]]

## 2024-11-16 LAB
A1C WITH ESTIMATED AVERAGE GLUCOSE RESULT: 5.3 % — SIGNIFICANT CHANGE UP (ref 4–5.6)
CHOLEST SERPL-MCNC: 227 MG/DL — HIGH
ESTIMATED AVERAGE GLUCOSE: 105 MG/DL — SIGNIFICANT CHANGE UP (ref 68–114)
HDLC SERPL-MCNC: 51 MG/DL — SIGNIFICANT CHANGE UP
LIPID PNL WITH DIRECT LDL SERPL: 147 MG/DL — HIGH
NON HDL CHOLESTEROL: 176 MG/DL — HIGH
T3FREE SERPL-MCNC: 2.07 PG/ML — SIGNIFICANT CHANGE UP (ref 2–4.4)
T4 FREE SERPL-MCNC: 1.5 NG/DL — SIGNIFICANT CHANGE UP (ref 0.9–1.8)
TRIGL SERPL-MCNC: 147 MG/DL — SIGNIFICANT CHANGE UP

## 2024-11-16 RX ADMIN — METHOCARBAMOL 500 MILLIGRAM(S): 500 TABLET, FILM COATED ORAL at 13:29

## 2024-11-16 RX ADMIN — Medication 5 MILLIGRAM(S): at 16:51

## 2024-11-16 RX ADMIN — ACETAMINOPHEN 500MG 1000 MILLIGRAM(S): 500 TABLET, COATED ORAL at 22:57

## 2024-11-16 RX ADMIN — ACETAMINOPHEN 500MG 1000 MILLIGRAM(S): 500 TABLET, COATED ORAL at 13:29

## 2024-11-16 RX ADMIN — LIDOCAINE 1 PATCH: 40 CREAM TOPICAL at 19:00

## 2024-11-16 RX ADMIN — TENOFOVIR DISOPROXIL FUMARATE TABLETS 300 MILLIGRAM(S): 300 TABLET, FILM COATED ORAL at 11:07

## 2024-11-16 RX ADMIN — METHOCARBAMOL 500 MILLIGRAM(S): 500 TABLET, FILM COATED ORAL at 21:11

## 2024-11-16 RX ADMIN — LIDOCAINE 1 PATCH: 40 CREAM TOPICAL at 11:07

## 2024-11-16 RX ADMIN — Medication 10 MILLIGRAM(S): at 07:57

## 2024-11-16 RX ADMIN — Medication 88 MICROGRAM(S): at 05:38

## 2024-11-16 RX ADMIN — ACETAMINOPHEN 500MG 1000 MILLIGRAM(S): 500 TABLET, COATED ORAL at 05:37

## 2024-11-16 RX ADMIN — ACETAMINOPHEN 500MG 1000 MILLIGRAM(S): 500 TABLET, COATED ORAL at 21:11

## 2024-11-16 RX ADMIN — LIDOCAINE 1 PATCH: 40 CREAM TOPICAL at 01:09

## 2024-11-16 RX ADMIN — METHOCARBAMOL 500 MILLIGRAM(S): 500 TABLET, FILM COATED ORAL at 05:38

## 2024-11-16 RX ADMIN — ACETAMINOPHEN 500MG 1000 MILLIGRAM(S): 500 TABLET, COATED ORAL at 12:10

## 2024-11-16 NOTE — PROGRESS NOTE ADULT - SUBJECTIVE AND OBJECTIVE BOX
Patient is a 64y old  Male who presents with a chief complaint of Syncope (16 Nov 2024 09:38)    PATIENT IS SEEN AND EXAMINED IN MEDICAL FLOOR.    MAGDI [    ]    BAUTISTA [   ]      GT [   ]    ALLERGIES:  No Known Allergies      Daily     Daily     VITALS:    Vital Signs Last 24 Hrs  T(C): 36.8 (16 Nov 2024 14:21), Max: 37.5 (16 Nov 2024 05:42)  T(F): 98.2 (16 Nov 2024 14:21), Max: 99.5 (16 Nov 2024 05:42)  HR: 77 (16 Nov 2024 14:21) (73 - 89)  BP: 97/62 (16 Nov 2024 14:21) (97/62 - 136/95)  BP(mean): --  RR: 17 (16 Nov 2024 14:21) (16 - 18)  SpO2: 95% (16 Nov 2024 14:21) (95% - 98%)    Parameters below as of 16 Nov 2024 14:21  Patient On (Oxygen Delivery Method): room air        LABS:    CBC Full  -  ( 15 Nov 2024 05:46 )  WBC Count : 10.15 K/uL  RBC Count : 4.23 M/uL  Hemoglobin : 12.4 g/dL  Hematocrit : 36.0 %  Platelet Count - Automated : 104 K/uL  Mean Cell Volume : 85.1 fl  Mean Cell Hemoglobin : 29.3 pg  Mean Cell Hemoglobin Concentration : 34.4 g/dL  Auto Neutrophil # : x  Auto Lymphocyte # : x  Auto Monocyte # : x  Auto Eosinophil # : x  Auto Basophil # : x  Auto Neutrophil % : x  Auto Lymphocyte % : x  Auto Monocyte % : x  Auto Eosinophil % : x  Auto Basophil % : x      11-15    141  |  109[H]  |  22[H]  ----------------------------<  94  4.0   |  28  |  1.31[H]    Ca    9.0      15 Nov 2024 05:46      CAPILLARY BLOOD GLUCOSE              Creatinine Trend: 1.31<--, 1.34<--, 1.37<--  I&O's Summary    15 Nov 2024 07:01  -  16 Nov 2024 07:00  --------------------------------------------------------  IN: 0 mL / OUT: 300 mL / NET: -300 mL                MEDICATIONS:    MEDICATIONS  (STANDING):  acetaminophen     Tablet .. 1000 milliGRAM(s) Oral every 8 hours  hydrocortisone 10 milliGRAM(s) Oral <User Schedule>  hydrocortisone 5 milliGRAM(s) Oral <User Schedule>  influenza   Vaccine 0.5 milliLiter(s) IntraMuscular once  levothyroxine 88 MICROGram(s) Oral daily  lidocaine   4% Patch 1 Patch Transdermal daily  methocarbamol 500 milliGRAM(s) Oral every 8 hours  sodium chloride 0.9%. 1000 milliLiter(s) (125 mL/Hr) IV Continuous <Continuous>  tenofovir disoproxil fumarate (VIREAD) 300 milliGRAM(s) Oral daily      MEDICATIONS  (PRN):  melatonin 3 milliGRAM(s) Oral at bedtime PRN Insomnia  oxyCODONE    IR 5 milliGRAM(s) Oral every 4 hours PRN Severe Pain (7 - 10)        REVIEW OF SYSTEMS:                           ALL ROS DONE [ X   ]      CONSTITUTIONAL:  LETHARGIC [   ], FEVER [   ], UNRESPONSIVE [   ]  CVS:  CP  [   ], SOB, [   ], PALPITATIONS [   ], DIZZYNESS [   ]  RS: COUGH [   ], SPUTUM [   ]  GI: ABDOMINAL PAIN [   ], NAUSEA [   ], VOMITINGS [   ], DIARRHEA [   ], CONSTIPATION [   ]  :  DYSURIA [   ], NOCTURIA [   ], INCREASED FREQUENCY [   ], DRIBLING [   ],  SKELETAL: PAINFUL JOINTS [   ], SWOLLEN JOINTS [   ], NECK ACHE [   ], LOW BACK ACHE [   ],  SKIN : ULCERS [   ], RASH [   ], ITCHING [   ]  CNS: HEAD ACHE [   ], DOUBLE VISION [   ], BLURRED VISION [   ], AMS / CONFUSION [   ], SEIZURES [   ], WEAKNESS [   ],TINGLING / NUMBNESS [   ]        PHYSICAL EXAMINATION:    GENERAL APPEARANCE: NO DISTRESS  HEENT:  NO PALLOR, NO  JVD,  NO   NODES, NECK SUPPLE  CVS: S1 +, S2 +,   RS: AEEB,  OCCASIONAL  RALES +,   NO RONCHI  ABD: SOFT, NT, NO, BS +  EXT: NO PE  SKIN: WARM,   SKELETAL:  ROM ACCEPTABLE  CNS:  AAO X 3      RADIOLOGY :  RADIOLOGY AND READINGS REVIEWED      ASSESSMENT :     Syncope and collapse    Hypercholesterolemia    HTN (hypertension)    CLL (chronic lymphocytic leukemia)    Pituitary adenoma    Hepatitis B    S/P appendectomy        PLAN:  HPI:  Pt is a 64-year-old male with 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 Wife at bedside also provided collateral information. Patient was feeling well all morning, and in the afternoon at 1 pm when walking to the bathroom he suddenly felt flushed and dizzy, had blurry vision and syncopized on the floor.  Patient does not remember very well regarding how he ended up on the floor or for how long he was on the floor.  When he woke up, he called his family who arrived to help him.  By the time his family came patient was able to stand and his dizziness improved.  After the fall patient states he felt diaphoretic  and was mildly confused. When he fell, patient hit his back on the floor. He denies any LOC, head trauma. Patient has a history of migraines but states they usually do not present this way or this suddenly and acutely.  In the ED, patient initially complained of sharp 10 out of 10 mid- lower back pain that is exacerbated by even minimal movement.  He is able to ambulate and move all extremities with no issues.  Denies any saddle anesthesia,  fecal or urinary incontinence, tingling in his lower extremities or radicular pain.  patient denies any symptoms of recent illness, nausea, vomiting, diarrhea, or fever.  Patient has previously had a similar episode occur 4 years ago in 2020.  At this time he came to the hospital and was found to have a pituitary adenoma for which he regularly follows with endocrinology Dr. Jenkins outpatient.  Patient is currently taking daily hydrocortisone and levothyroxine. On my exam in ED, patient endorses lower back pain that is worse when he moves.  His symptoms of dizziness have since resolved.  Patient admitted to medicine for syncope workup and acute L1 fracture.          (13 Nov 2024 21:41)    #  CASE D/W WIFE AT BEDSIDE. ALL QUESTIONS ANSWERED    # SYNCOPE - LIKELY VASOVAGAL   - TELEMETRY  - F/U ORTHOSTATIC VITALS  - ECHO - LV SYSTOLIC EF - 62%  - NST - NORMAL MYOCARDIAL PERFUSION SCAN W/ NO EVIDENCE OF INFARCTION OR INDUCIBLE ISCHEMIA  - NOTED CT HEAD  - F/U PT EVAL  - F/U MRI PITUITARY  - CARDIOLOGY CONSULT  - NEUROLOGY CONSULT    # HX OF PANHYPOPITUITARISM [PITUITARY ADENOMA]  # CENTAL HYPOTHYROIDISM  # HYPERPROLACTINEMIA  # SECONDARY ADRENAL INSUFFICIENCY  # SECONDARY HYPOGONADISM  - NOTED TSH, F/U FT3 AND FT4  - F/U CORTISOL    - LEVOTHYROXINE  - ON HYDROCORTISONE  - F/U MRI PITUITARY  - ENDOCRINOLOGY CONSULT    # BACK PAIN S/T ACUTE LUMBAR FRACTURE  - NOTED CT L SPINE  - PRN PAIN CONTROL  - F/U PT EVAL  - ORTHOSPINE CONSULT    # REBECCA ON CKD  - F/U UA  - F/U PVR  - MONITOR CR  - AVOID NEPHROTOXIC AGENTS    # CLL ON ORAL CHEMOTHERAPY    # HEPATITIS B ON TENOFOVIR    # HX OF VESTIBULAR MIGRAINE    # VITAMIN D INSUFFICIENCY  - F/U 25OH VIT D  - SUPPLEMENT    # HYPERLIPIDEMIA  - F/U LIPID PANEL    # PRE-DM  - F/U HBA1C    # SMOKER  - COUNSELLED ON CESSATION > 10 MINS    # GI AND DVT PPX     DR. RICHARD HERNANDEZ

## 2024-11-16 NOTE — PROGRESS NOTE ADULT - SUBJECTIVE AND OBJECTIVE BOX
Time of Visit:  Patient seen and examined. pat seen lying in bed comfortable     MEDICATIONS  (STANDING):  acetaminophen     Tablet .. 1000 milliGRAM(s) Oral every 8 hours  hydrocortisone 10 milliGRAM(s) Oral <User Schedule>  hydrocortisone 5 milliGRAM(s) Oral <User Schedule>  influenza   Vaccine 0.5 milliLiter(s) IntraMuscular once  levothyroxine 88 MICROGram(s) Oral daily  lidocaine   4% Patch 1 Patch Transdermal daily  methocarbamol 500 milliGRAM(s) Oral every 8 hours  sodium chloride 0.9%. 1000 milliLiter(s) (125 mL/Hr) IV Continuous <Continuous>  tenofovir disoproxil fumarate (VIREAD) 300 milliGRAM(s) Oral daily      MEDICATIONS  (PRN):  melatonin 3 milliGRAM(s) Oral at bedtime PRN Insomnia  oxyCODONE    IR 5 milliGRAM(s) Oral every 4 hours PRN Severe Pain (7 - 10)       Medications up to date at time of exam.      PHYSICAL EXAMINATION:  Patient has no new complaints.  GENERAL: The patient  in no apparent distress.     Vital Signs Last 24 Hrs  T(C): 36.8 (16 Nov 2024 14:21), Max: 37.5 (16 Nov 2024 05:42)  T(F): 98.2 (16 Nov 2024 14:21), Max: 99.5 (16 Nov 2024 05:42)  HR: 77 (16 Nov 2024 14:21) (73 - 89)  BP: 97/62 (16 Nov 2024 14:21) (97/62 - 136/95)  BP(mean): --  RR: 17 (16 Nov 2024 14:21) (16 - 18)  SpO2: 95% (16 Nov 2024 14:21) (95% - 98%)    Parameters below as of 16 Nov 2024 14:21  Patient On (Oxygen Delivery Method): room air       (if applicable)    Chest Tube (if applicable)    HEENT: Head is normocephalic and atraumatic. Extraocular muscles are intact. Mucous membranes are moist.     NECK: Supple, no palpable adenopathy.    LUNGS: Fair bilateral air entry   no wheezing, rales, or rhonchi.    HEART: Regular rate and rhythm without murmur.    ABDOMEN: Soft, nontender, and nondistended.  No hepatosplenomegaly is noted.    : No painful voiding, no pelvic pain    EXTREMITIES: Without any cyanosis, clubbing, rash, lesions or edema.    NEUROLOGIC: Awake, alert, oriented, grossly intact    SKIN: Warm, dry, good turgor.      LABS:                        12.4   10.15 )-----------( 104      ( 15 Nov 2024 05:46 )             36.0     11-15    141  |  109[H]  |  22[H]  ----------------------------<  94  4.0   |  28  |  1.31[H]    Ca    9.0      15 Nov 2024 05:46        Urinalysis Basic - ( 15 Nov 2024 05:46 )    Color: x / Appearance: x / SG: x / pH: x  Gluc: 94 mg/dL / Ketone: x  / Bili: x / Urobili: x   Blood: x / Protein: x / Nitrite: x   Leuk Esterase: x / RBC: x / WBC x   Sq Epi: x / Non Sq Epi: x / Bacteria: x    MICROBIOLOGY: (if applicable)    RADIOLOGY & ADDITIONAL STUDIES:  EKG:   CXR:  ECHO:    IMPRESSION: 64y Male PAST MEDICAL & SURGICAL HISTORY:  Hypercholesterolemia      CLL (chronic lymphocytic leukemia)      Pituitary adenoma      Hepatitis B      S/P appendectomy  10 years ago       p/w       Impression: This is a 63 Y/O male active smoker .  With CLL on oral chemotherapy, hepatitis B on Tenofovir. Presented to ED after a fall in his home with symptoms of Dizziness which was resolved at this time . Patient endorses lower back pain that is worse when he moves.  His symptoms of dizziness have since resolved.  Admitted to medicine for syncope workup and acute L1 fracture. For pulmonary evaluation due to showing CT Chest with Paraseptal Emphysema . No Pleural Effusion.     Suggestion:  - O2 saturation 97% room air. So far saturating good room air.  - Reinforced the importance of smoking cessation , not willing to quit, per pt he will think about it but will follow the hospital protocol of no smoking.   - Cardiology following for Syncope.  - No Lung work up for Emphysema at this time. Can benefit with outpatient PFT .  - Ortho noted   - Pt seen pat   - Pain control

## 2024-11-16 NOTE — PROGRESS NOTE ADULT - SUBJECTIVE AND OBJECTIVE BOX
C A R D I O L O G Y  **********************************     DATE OF SERVICE: 11-16-24       no chest pain or sob        acetaminophen     Tablet .. 1000 milliGRAM(s) Oral every 8 hours  hydrocortisone 10 milliGRAM(s) Oral <User Schedule>  hydrocortisone 5 milliGRAM(s) Oral <User Schedule>  influenza   Vaccine 0.5 milliLiter(s) IntraMuscular once  levothyroxine 88 MICROGram(s) Oral daily  lidocaine   4% Patch 1 Patch Transdermal daily  melatonin 3 milliGRAM(s) Oral at bedtime PRN  methocarbamol 500 milliGRAM(s) Oral every 8 hours  oxyCODONE    IR 5 milliGRAM(s) Oral every 4 hours PRN  sodium chloride 0.9%. 1000 milliLiter(s) IV Continuous <Continuous>  tenofovir disoproxil fumarate (VIREAD) 300 milliGRAM(s) Oral daily                            12.4   10.15 )-----------( 104      ( 15 Nov 2024 05:46 )             36.0       Hemoglobin: 12.4 g/dL (11-15 @ 05:46)  Hemoglobin: 12.8 g/dL (11-14 @ 05:25)  Hemoglobin: 12.4 g/dL (11-13 @ 15:56)      11-15    141  |  109[H]  |  22[H]  ----------------------------<  94  4.0   |  28  |  1.31[H]    Ca    9.0      15 Nov 2024 05:46      Creatinine Trend: 1.31<--, 1.34<--, 1.37<--    COAGS:           T(C): 37.5 (11-16-24 @ 05:42), Max: 37.5 (11-16-24 @ 05:42)  HR: 78 (11-16-24 @ 05:42) (73 - 92)  BP: 97/78 (11-16-24 @ 05:42) (97/78 - 136/95)  RR: 17 (11-16-24 @ 05:42) (16 - 18)  SpO2: 98% (11-16-24 @ 05:42) (95% - 100%)  Wt(kg): --    I&O's Summary    15 Nov 2024 07:01  -  16 Nov 2024 07:00  --------------------------------------------------------  IN: 0 mL / OUT: 300 mL / NET: -300 mL          Gen: Appears well in NAD  HEENT:  (-)icterus (-)pallor  CV: N S1 S2 1/6 KELLY (+)2 Pulses B/l  Resp:  Clear to ausculatation B/L, normal effort  GI: (+) BS Soft, NT, ND  Lymph:  (-)Edema, (-)obvious lymphadenopathy  Skin: Warm to touch, Normal turgor  Psych: Appropriate mood and affect      TELEMETRY: 	sinus        ASSESSMENT/PLAN: 	64y  Male with history of 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

## 2024-11-17 RX ORDER — ONDANSETRON HYDROCHLORIDE 4 MG/1
4 TABLET, FILM COATED ORAL ONCE
Refills: 0 | Status: COMPLETED | OUTPATIENT
Start: 2024-11-17 | End: 2024-11-17

## 2024-11-17 RX ORDER — PANTOPRAZOLE SODIUM 40 MG/1
40 TABLET, DELAYED RELEASE ORAL
Refills: 0 | Status: DISCONTINUED | OUTPATIENT
Start: 2024-11-17 | End: 2024-11-18

## 2024-11-17 RX ORDER — SENNOSIDES 8.6 MG
2 TABLET ORAL AT BEDTIME
Refills: 0 | Status: DISCONTINUED | OUTPATIENT
Start: 2024-11-17 | End: 2024-11-18

## 2024-11-17 RX ORDER — POLYETHYLENE GLYCOL 3350 17 G/17G
17 POWDER, FOR SOLUTION ORAL DAILY
Refills: 0 | Status: DISCONTINUED | OUTPATIENT
Start: 2024-11-17 | End: 2024-11-18

## 2024-11-17 RX ADMIN — Medication 5 MILLIGRAM(S): at 16:57

## 2024-11-17 RX ADMIN — ACETAMINOPHEN 500MG 1000 MILLIGRAM(S): 500 TABLET, COATED ORAL at 14:00

## 2024-11-17 RX ADMIN — Medication 2 TABLET(S): at 22:22

## 2024-11-17 RX ADMIN — Medication 10 MILLIGRAM(S): at 08:33

## 2024-11-17 RX ADMIN — LIDOCAINE 1 PATCH: 40 CREAM TOPICAL at 23:20

## 2024-11-17 RX ADMIN — LIDOCAINE 1 PATCH: 40 CREAM TOPICAL at 19:30

## 2024-11-17 RX ADMIN — ACETAMINOPHEN 500MG 1000 MILLIGRAM(S): 500 TABLET, COATED ORAL at 22:51

## 2024-11-17 RX ADMIN — POLYETHYLENE GLYCOL 3350 17 GRAM(S): 17 POWDER, FOR SOLUTION ORAL at 14:36

## 2024-11-17 RX ADMIN — ACETAMINOPHEN 500MG 1000 MILLIGRAM(S): 500 TABLET, COATED ORAL at 22:21

## 2024-11-17 RX ADMIN — LIDOCAINE 1 PATCH: 40 CREAM TOPICAL at 11:20

## 2024-11-17 RX ADMIN — LIDOCAINE 1 PATCH: 40 CREAM TOPICAL at 00:28

## 2024-11-17 RX ADMIN — ONDANSETRON HYDROCHLORIDE 4 MILLIGRAM(S): 4 TABLET, FILM COATED ORAL at 06:03

## 2024-11-17 RX ADMIN — ACETAMINOPHEN 500MG 1000 MILLIGRAM(S): 500 TABLET, COATED ORAL at 13:41

## 2024-11-17 NOTE — CHART NOTE - NSCHARTNOTEFT_GEN_A_CORE
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 Medication is being refilled via mail. Pt also takes Calquence 100mg daily, asked the wife to bring in home medication to continue it while he is admitted. Pt follows with Dr Karri Solano at [facility] for CLL

## 2024-11-17 NOTE — PROGRESS NOTE ADULT - SUBJECTIVE AND OBJECTIVE BOX
Time of Visit:  Patient seen and examined.     MEDICATIONS  (STANDING):  acetaminophen     Tablet .. 1000 milliGRAM(s) Oral every 8 hours  atorvastatin 80 milliGRAM(s) Oral at bedtime  hydrocortisone 10 milliGRAM(s) Oral <User Schedule>  hydrocortisone 5 milliGRAM(s) Oral <User Schedule>  influenza   Vaccine 0.5 milliLiter(s) IntraMuscular once  levothyroxine 88 MICROGram(s) Oral daily  lidocaine   4% Patch 1 Patch Transdermal daily  methocarbamol 500 milliGRAM(s) Oral every 8 hours  pantoprazole    Tablet 40 milliGRAM(s) Oral before breakfast  polyethylene glycol 3350 17 Gram(s) Oral daily  senna 2 Tablet(s) Oral at bedtime  sodium chloride 0.9%. 1000 milliLiter(s) (125 mL/Hr) IV Continuous <Continuous>  tenofovir disoproxil fumarate (VIREAD) 300 milliGRAM(s) Oral daily      MEDICATIONS  (PRN):  melatonin 3 milliGRAM(s) Oral at bedtime PRN Insomnia  oxyCODONE    IR 5 milliGRAM(s) Oral every 4 hours PRN Severe Pain (7 - 10)       Medications up to date at time of exam.      PHYSICAL EXAMINATION:  Patient has no new complaints.  GENERAL: The patient  in no apparent distress.     Vital Signs Last 24 Hrs  T(C): 36.7 (17 Nov 2024 16:00), Max: 37.7 (17 Nov 2024 10:07)  T(F): 98.1 (17 Nov 2024 16:00), Max: 99.9 (17 Nov 2024 10:07)  HR: 74 (17 Nov 2024 16:00) (71 - 96)  BP: 97/64 (17 Nov 2024 16:00) (97/64 - 115/77)  BP(mean): 89 (17 Nov 2024 05:12) (85 - 89)  RR: 16 (17 Nov 2024 16:00) (16 - 18)  SpO2: 97% (17 Nov 2024 16:00) (96% - 100%)    Parameters below as of 17 Nov 2024 16:00  Patient On (Oxygen Delivery Method): room air       (if applicable)    Chest Tube (if applicable)    HEENT: Head is normocephalic and atraumatic. Extraocular muscles are intact. Mucous membranes are moist.     NECK: Supple, no palpable adenopathy.    LUNGS: Fair bilateral air entry   no wheezing, rales, or rhonchi.    HEART: Regular rate and rhythm without murmur.    ABDOMEN: Soft, nontender, and nondistended.  No hepatosplenomegaly is noted.    : No painful voiding, no pelvic pain    EXTREMITIES: Without any cyanosis, clubbing, rash, lesions or edema.    NEUROLOGIC: Awake, alert, oriented, grossly intact    SKIN: Warm, dry, good turgor.      LABS:                              MICROBIOLOGY: (if applicable)    RADIOLOGY & ADDITIONAL STUDIES:  EKG:   CXR:  ECHO:    IMPRESSION: 64y Male PAST MEDICAL & SURGICAL HISTORY:  Hypercholesterolemia      CLL (chronic lymphocytic leukemia)      Pituitary adenoma      Hepatitis B      S/P appendectomy  10 years ago       p/w       Impression: This is a 65 Y/O male active smoker .  With CLL on oral chemotherapy, hepatitis B on Tenofovir. Presented to ED after a fall in his home with symptoms of Dizziness which was resolved at this time . Patient endorses lower back pain that is worse when he moves.  His symptoms of dizziness have since resolved.  Admitted to medicine for syncope workup and acute L1 fracture. For pulmonary evaluation due to showing CT Chest with Paraseptal Emphysema . No Pleural Effusion.     Suggestion:  - O2 saturation 97% room air. So far saturating good room air.  - Reinforced the importance of smoking cessation , not willing to quit, per pt he will think about it but will follow the hospital protocol of no smoking.   - Cardiology following for Syncope.  - No Lung work up for Emphysema at this time. Can benefit with outpatient PFT .  - Ortho noted   - Pt seen pat   - Pain control   - F/U MRI     spoke with wife at bedside

## 2024-11-17 NOTE — PROGRESS NOTE ADULT - NS ATTEND AMEND GEN_ALL_CORE FT
uneventful overnight, no change in above plan of care.
syncope likely multifactorial... vasovagal vs orthostatic +/- home meds?  no invasive CV/EP intervention planned.  echocardiogram and stress test are reassuringly normal. will follow with you.

## 2024-11-17 NOTE — PROGRESS NOTE ADULT - SUBJECTIVE AND OBJECTIVE BOX
Patient is a 64y old  Male who presents with a chief complaint of Syncope (17 Nov 2024 10:04)    PATIENT IS SEEN AND EXAMINED IN MEDICAL FLOOR.    CHOLOT [    ]    BAUTISTA [   ]      GT [   ]    ALLERGIES:  No Known Allergies      Daily     Daily     VITALS:    Vital Signs Last 24 Hrs  T(C): 36.9 (17 Nov 2024 14:13), Max: 37.7 (17 Nov 2024 10:07)  T(F): 98.5 (17 Nov 2024 14:13), Max: 99.9 (17 Nov 2024 10:07)  HR: 76 (17 Nov 2024 14:13) (71 - 96)  BP: 100/65 (17 Nov 2024 14:13) (98/61 - 115/77)  BP(mean): 89 (17 Nov 2024 05:12) (85 - 89)  RR: 18 (17 Nov 2024 14:13) (17 - 18)  SpO2: 96% (17 Nov 2024 14:13) (96% - 100%)    Parameters below as of 17 Nov 2024 14:13  Patient On (Oxygen Delivery Method): room air        LABS:              CAPILLARY BLOOD GLUCOSE              Creatinine Trend: 1.31<--, 1.34<--, 1.37<--  I&O's Summary              MEDICATIONS:    MEDICATIONS  (STANDING):  acetaminophen     Tablet .. 1000 milliGRAM(s) Oral every 8 hours  atorvastatin 80 milliGRAM(s) Oral at bedtime  hydrocortisone 10 milliGRAM(s) Oral <User Schedule>  hydrocortisone 5 milliGRAM(s) Oral <User Schedule>  influenza   Vaccine 0.5 milliLiter(s) IntraMuscular once  levothyroxine 88 MICROGram(s) Oral daily  lidocaine   4% Patch 1 Patch Transdermal daily  methocarbamol 500 milliGRAM(s) Oral every 8 hours  pantoprazole    Tablet 40 milliGRAM(s) Oral before breakfast  polyethylene glycol 3350 17 Gram(s) Oral daily  senna 2 Tablet(s) Oral at bedtime  sodium chloride 0.9%. 1000 milliLiter(s) (125 mL/Hr) IV Continuous <Continuous>  tenofovir disoproxil fumarate (VIREAD) 300 milliGRAM(s) Oral daily      MEDICATIONS  (PRN):  melatonin 3 milliGRAM(s) Oral at bedtime PRN Insomnia  oxyCODONE    IR 5 milliGRAM(s) Oral every 4 hours PRN Severe Pain (7 - 10)        REVIEW OF SYSTEMS:                           ALL ROS DONE [ X   ]      CONSTITUTIONAL:  LETHARGIC [   ], FEVER [   ], UNRESPONSIVE [   ]  CVS:  CP  [   ], SOB, [   ], PALPITATIONS [   ], DIZZYNESS [   ]  RS: COUGH [   ], SPUTUM [   ]  GI: ABDOMINAL PAIN [   ], NAUSEA [   ], VOMITINGS [   ], DIARRHEA [   ], CONSTIPATION [   ]  :  DYSURIA [   ], NOCTURIA [   ], INCREASED FREQUENCY [   ], DRIBLING [   ],  SKELETAL: PAINFUL JOINTS [   ], SWOLLEN JOINTS [   ], NECK ACHE [   ], LOW BACK ACHE [   ],  SKIN : ULCERS [   ], RASH [   ], ITCHING [   ]  CNS: HEAD ACHE [   ], DOUBLE VISION [   ], BLURRED VISION [   ], AMS / CONFUSION [   ], SEIZURES [   ], WEAKNESS [   ],TINGLING / NUMBNESS [   ]        PHYSICAL EXAMINATION:    GENERAL APPEARANCE: NO DISTRESS  HEENT:  NO PALLOR, NO  JVD,  NO   NODES, NECK SUPPLE  CVS: S1 +, S2 +,   RS: AEEB,  OCCASIONAL  RALES +,   NO RONCHI  ABD: SOFT, NT, NO, BS +  EXT: NO PE  SKIN: WARM,   SKELETAL:  ROM ACCEPTABLE  CNS:  AAO X 3      RADIOLOGY :  RADIOLOGY AND READINGS REVIEWED          ASSESSMENT :     Syncope and collapse    Hypercholesterolemia    HTN (hypertension)    CLL (chronic lymphocytic leukemia)    Pituitary adenoma    Hepatitis B    S/P appendectomy        PLAN:  HPI:  Pt is a 64-year-old male with 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 Wife at bedside also provided collateral information. Patient was feeling well all morning, and in the afternoon at 1 pm when walking to the bathroom he suddenly felt flushed and dizzy, had blurry vision and syncopized on the floor.  Patient does not remember very well regarding how he ended up on the floor or for how long he was on the floor.  When he woke up, he called his family who arrived to help him.  By the time his family came patient was able to stand and his dizziness improved.  After the fall patient states he felt diaphoretic  and was mildly confused. When he fell, patient hit his back on the floor. He denies any LOC, head trauma. Patient has a history of migraines but states they usually do not present this way or this suddenly and acutely.  In the ED, patient initially complained of sharp 10 out of 10 mid- lower back pain that is exacerbated by even minimal movement.  He is able to ambulate and move all extremities with no issues.  Denies any saddle anesthesia,  fecal or urinary incontinence, tingling in his lower extremities or radicular pain.  patient denies any symptoms of recent illness, nausea, vomiting, diarrhea, or fever.  Patient has previously had a similar episode occur 4 years ago in 2020.  At this time he came to the hospital and was found to have a pituitary adenoma for which he regularly follows with endocrinology Dr. Jenkins outpatient.  Patient is currently taking daily hydrocortisone and levothyroxine. On my exam in ED, patient endorses lower back pain that is worse when he moves.  His symptoms of dizziness have since resolved.  Patient admitted to medicine for syncope workup and acute L1 fracture.         # DC PLAN HOME IN AM WITH HOME PT  - PT EVALUATION IS SEEN      #  CASE D/W WIFE AT BEDSIDE. ALL QUESTIONS ANSWERED    # SYNCOPE - LIKELY VASOVAGAL   - TELEMETRY  - F/U ORTHOSTATIC VITALS  - ECHO - LV SYSTOLIC EF - 62%  - NST - NORMAL MYOCARDIAL PERFUSION SCAN W/ NO EVIDENCE OF INFARCTION OR INDUCIBLE ISCHEMIA  - NOTED CT HEAD  - F/U PT EVAL  - F/U MRI PITUITARY  - CARDIOLOGY CONSULT  - NEUROLOGY CONSULT    # HX OF PANHYPOPITUITARISM [PITUITARY ADENOMA]  # CENTAL HYPOTHYROIDISM  # HYPERPROLACTINEMIA  # SECONDARY ADRENAL INSUFFICIENCY  # SECONDARY HYPOGONADISM  - NOTED TSH, F/U FT3 AND FT4  - F/U CORTISOL    - LEVOTHYROXINE  - ON HYDROCORTISONE  - F/U MRI PITUITARY  - ENDOCRINOLOGY CONSULT      # BACK PAIN S/T ACUTE MILD COMPRESSION FRACTURE OF L1 VERTEBRA   # OSTEOPOROSIS - CONTINUE OTC VIT D3 1000 U Q DAILY, CALCIUM 2 TABS WITH LUNCH   - NOTED CT L SPINE  - PRN PAIN CONTROL  - F/U PT EVAL  - ORTHOSPINE CONSULT    # REBECCA ON CKD  - F/U UA  - F/U PVR  - MONITOR CR  - AVOID NEPHROTOXIC AGENTS    # CLL ON ORAL CHEMOTHERAPY    # HEPATITIS B ON TENOFOVIR    # HX OF VESTIBULAR MIGRAINE    # VITAMIN D INSUFFICIENCY  - F/U 25OH VIT D  - SUPPLEMENT    # HYPERLIPIDEMIA  - F/U LIPID PANEL    # PRE-DM  - F/U HBA1C    # SMOKER  - COUNSELLED ON CESSATION > 10 MINS    # GI AND DVT PPX     DR. RICHARD HERNANDEZ

## 2024-11-17 NOTE — PROGRESS NOTE ADULT - SUBJECTIVE AND OBJECTIVE BOX
C A R D I O L O G Y  **********************************     DATE OF SERVICE: 11-17-24         no chest pain        acetaminophen     Tablet .. 1000 milliGRAM(s) Oral every 8 hours  hydrocortisone 10 milliGRAM(s) Oral <User Schedule>  hydrocortisone 5 milliGRAM(s) Oral <User Schedule>  influenza   Vaccine 0.5 milliLiter(s) IntraMuscular once  levothyroxine 88 MICROGram(s) Oral daily  lidocaine   4% Patch 1 Patch Transdermal daily  melatonin 3 milliGRAM(s) Oral at bedtime PRN  methocarbamol 500 milliGRAM(s) Oral every 8 hours  oxyCODONE    IR 5 milliGRAM(s) Oral every 4 hours PRN  sodium chloride 0.9%. 1000 milliLiter(s) IV Continuous <Continuous>  tenofovir disoproxil fumarate (VIREAD) 300 milliGRAM(s) Oral daily          Hemoglobin: 12.4 g/dL (11-15 @ 05:46)  Hemoglobin: 12.8 g/dL (11-14 @ 05:25)  Hemoglobin: 12.4 g/dL (11-13 @ 15:56)            Creatinine Trend: 1.31<--, 1.34<--, 1.37<--    COAGS:           T(C): 37.3 (11-17-24 @ 05:12), Max: 37.3 (11-17-24 @ 05:12)  HR: 93 (11-17-24 @ 05:12) (71 - 93)  BP: 115/77 (11-17-24 @ 05:12) (97/62 - 115/77)  RR: 18 (11-17-24 @ 05:12) (17 - 18)  SpO2: 100% (11-17-24 @ 05:12) (95% - 100%)  Wt(kg): --    I&O's Summary      Gen: Appears well in NAD  HEENT:  (-)icterus (-)pallor  CV: N S1 S2 1/6 KELLY (+)2 Pulses B/l  Resp:  Clear to ausculatation B/L, normal effort  GI: (+) BS Soft, NT, ND  Lymph:  (-)Edema, (-)obvious lymphadenopathy  Skin: Warm to touch, Normal turgor  Psych: Appropriate mood and affect      TELEMETRY: 	sinus        ASSESSMENT/PLAN: 	64y  Male with history of 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          C A R D I O L O G Y  **********************************     DATE OF SERVICE: 11-17-24         no chest pain      acetaminophen     Tablet .. 1000 milliGRAM(s) Oral every 8 hours  hydrocortisone 10 milliGRAM(s) Oral <User Schedule>  hydrocortisone 5 milliGRAM(s) Oral <User Schedule>  influenza   Vaccine 0.5 milliLiter(s) IntraMuscular once  levothyroxine 88 MICROGram(s) Oral daily  lidocaine   Patch 1 Patch Transdermal daily  melatonin 3 milliGRAM(s) Oral at bedtime PRN  methocarbamol 500 milliGRAM(s) Oral every 8 hours  oxyCODONE    IR 5 milliGRAM(s) Oral every 4 hours PRN  sodium chloride 0.9%. 1000 milliLiter(s) IV Continuous <Continuous>  tenofovir disoproxil fumarate (VIREAD) 300 milliGRAM(s) Oral daily      Hemoglobin: 12.4 g/dL (11-15 @ 05:46)  Hemoglobin: 12.8 g/dL (11-14 @ 05:25)  Hemoglobin: 12.4 g/dL (11-13 @ 15:56)      Creatinine Trend: 1.31<--, 1.34<--, 1.37<--    COAGS:       T(C): 37.3 (11-17-24 @ 05:12), Max: 37.3 (11-17-24 @ 05:12)  HR: 93 (11-17-24 @ 05:12) (71 - 93)  BP: 115/77 (11-17-24 @ 05:12) (97/62 - 115/77)  RR: 18 (11-17-24 @ 05:12) (17 - 18)  SpO2: 100% (11-17-24 @ 05:12) (95% - 100%)  Wt(kg): --    I&O's Summary      Gen: Appears well in NAD  HEENT:  (-)icterus (-)pallor  CV: N S1 S2 1/6 KELLY (+)2 Pulses B/l  Resp:  Clear to ausculatation B/L, normal effort  GI: (+) BS Soft, NT, ND  Lymph:  (-)Edema, (-)obvious lymphadenopathy  Skin: Warm to touch, Normal turgor  Psych: Appropriate mood and affect      TELEMETRY: 	sinus        ASSESSMENT/PLAN: 	64y  Male with history of 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

## 2024-11-18 ENCOUNTER — TRANSCRIPTION ENCOUNTER (OUTPATIENT)
Age: 64
End: 2024-11-18

## 2024-11-18 VITALS
OXYGEN SATURATION: 96 % | RESPIRATION RATE: 18 BRPM | SYSTOLIC BLOOD PRESSURE: 117 MMHG | TEMPERATURE: 98 F | DIASTOLIC BLOOD PRESSURE: 70 MMHG | HEART RATE: 76 BPM

## 2024-11-18 PROBLEM — B19.10 UNSPECIFIED VIRAL HEPATITIS B WITHOUT HEPATIC COMA: Chronic | Status: ACTIVE | Noted: 2024-11-14

## 2024-11-18 PROBLEM — D35.2 BENIGN NEOPLASM OF PITUITARY GLAND: Chronic | Status: ACTIVE | Noted: 2024-11-14

## 2024-11-18 LAB
ALBUMIN SERPL ELPH-MCNC: 3.5 G/DL — SIGNIFICANT CHANGE UP (ref 3.5–5)
ALP SERPL-CCNC: 77 U/L — SIGNIFICANT CHANGE UP (ref 40–120)
ALT FLD-CCNC: 36 U/L DA — SIGNIFICANT CHANGE UP (ref 10–60)
ANION GAP SERPL CALC-SCNC: 5 MMOL/L — SIGNIFICANT CHANGE UP (ref 5–17)
AST SERPL-CCNC: 33 U/L — SIGNIFICANT CHANGE UP (ref 10–40)
BASOPHILS # BLD AUTO: 0.01 K/UL — SIGNIFICANT CHANGE UP (ref 0–0.2)
BASOPHILS NFR BLD AUTO: 0.2 % — SIGNIFICANT CHANGE UP (ref 0–2)
BILIRUB SERPL-MCNC: 0.7 MG/DL — SIGNIFICANT CHANGE UP (ref 0.2–1.2)
BUN SERPL-MCNC: 20 MG/DL — HIGH (ref 7–18)
CALCIUM SERPL-MCNC: 8.6 MG/DL — SIGNIFICANT CHANGE UP (ref 8.4–10.5)
CHLORIDE SERPL-SCNC: 101 MMOL/L — SIGNIFICANT CHANGE UP (ref 96–108)
CO2 SERPL-SCNC: 28 MMOL/L — SIGNIFICANT CHANGE UP (ref 22–31)
CREAT SERPL-MCNC: 1.27 MG/DL — SIGNIFICANT CHANGE UP (ref 0.5–1.3)
EGFR: 63 ML/MIN/1.73M2 — SIGNIFICANT CHANGE UP
EOSINOPHIL # BLD AUTO: 0.08 K/UL — SIGNIFICANT CHANGE UP (ref 0–0.5)
EOSINOPHIL NFR BLD AUTO: 1.3 % — SIGNIFICANT CHANGE UP (ref 0–6)
GLUCOSE SERPL-MCNC: 92 MG/DL — SIGNIFICANT CHANGE UP (ref 70–99)
HCT VFR BLD CALC: 35.1 % — LOW (ref 39–50)
HGB BLD-MCNC: 12.3 G/DL — LOW (ref 13–17)
IMM GRANULOCYTES NFR BLD AUTO: 0.8 % — SIGNIFICANT CHANGE UP (ref 0–0.9)
LYMPHOCYTES # BLD AUTO: 1.17 K/UL — SIGNIFICANT CHANGE UP (ref 1–3.3)
LYMPHOCYTES # BLD AUTO: 19.7 % — SIGNIFICANT CHANGE UP (ref 13–44)
MAGNESIUM SERPL-MCNC: 2.4 MG/DL — SIGNIFICANT CHANGE UP (ref 1.6–2.6)
MANUAL SMEAR VERIFICATION: SIGNIFICANT CHANGE UP
MCHC RBC-ENTMCNC: 28.5 PG — SIGNIFICANT CHANGE UP (ref 27–34)
MCHC RBC-ENTMCNC: 35 G/DL — SIGNIFICANT CHANGE UP (ref 32–36)
MCV RBC AUTO: 81.4 FL — SIGNIFICANT CHANGE UP (ref 80–100)
MONOCYTES # BLD AUTO: 0.78 K/UL — SIGNIFICANT CHANGE UP (ref 0–0.9)
MONOCYTES NFR BLD AUTO: 13.1 % — SIGNIFICANT CHANGE UP (ref 2–14)
NEUTROPHILS # BLD AUTO: 3.85 K/UL — SIGNIFICANT CHANGE UP (ref 1.8–7.4)
NEUTROPHILS NFR BLD AUTO: 64.9 % — SIGNIFICANT CHANGE UP (ref 43–77)
NRBC # BLD: 0 /100 WBCS — SIGNIFICANT CHANGE UP (ref 0–0)
PHOSPHATE SERPL-MCNC: 2.7 MG/DL — SIGNIFICANT CHANGE UP (ref 2.5–4.5)
PLAT MORPH BLD: NORMAL — SIGNIFICANT CHANGE UP
PLATELET # BLD AUTO: 86 K/UL — LOW (ref 150–400)
PLATELET COUNT - ESTIMATE: ABNORMAL
POTASSIUM SERPL-MCNC: 3.8 MMOL/L — SIGNIFICANT CHANGE UP (ref 3.5–5.3)
POTASSIUM SERPL-SCNC: 3.8 MMOL/L — SIGNIFICANT CHANGE UP (ref 3.5–5.3)
PROT SERPL-MCNC: 6.4 G/DL — SIGNIFICANT CHANGE UP (ref 6–8.3)
RBC # BLD: 4.31 M/UL — SIGNIFICANT CHANGE UP (ref 4.2–5.8)
RBC # FLD: 12.1 % — SIGNIFICANT CHANGE UP (ref 10.3–14.5)
RBC BLD AUTO: NORMAL — SIGNIFICANT CHANGE UP
SODIUM SERPL-SCNC: 134 MMOL/L — LOW (ref 135–145)
WBC # BLD: 5.94 K/UL — SIGNIFICANT CHANGE UP (ref 3.8–10.5)
WBC # FLD AUTO: 5.94 K/UL — SIGNIFICANT CHANGE UP (ref 3.8–10.5)

## 2024-11-18 PROCEDURE — 96374 THER/PROPH/DIAG INJ IV PUSH: CPT

## 2024-11-18 PROCEDURE — 93306 TTE W/DOPPLER COMPLETE: CPT

## 2024-11-18 PROCEDURE — 84443 ASSAY THYROID STIM HORMONE: CPT

## 2024-11-18 PROCEDURE — 84484 ASSAY OF TROPONIN QUANT: CPT

## 2024-11-18 PROCEDURE — 82550 ASSAY OF CK (CPK): CPT

## 2024-11-18 PROCEDURE — 80061 LIPID PANEL: CPT

## 2024-11-18 PROCEDURE — 85025 COMPLETE CBC W/AUTO DIFF WBC: CPT

## 2024-11-18 PROCEDURE — 85379 FIBRIN DEGRADATION QUANT: CPT

## 2024-11-18 PROCEDURE — 85730 THROMBOPLASTIN TIME PARTIAL: CPT

## 2024-11-18 PROCEDURE — 84100 ASSAY OF PHOSPHORUS: CPT

## 2024-11-18 PROCEDURE — 83735 ASSAY OF MAGNESIUM: CPT

## 2024-11-18 PROCEDURE — 78452 HT MUSCLE IMAGE SPECT MULT: CPT | Mod: MC

## 2024-11-18 PROCEDURE — 71260 CT THORAX DX C+: CPT | Mod: MC

## 2024-11-18 PROCEDURE — A9585: CPT

## 2024-11-18 PROCEDURE — 74177 CT ABD & PELVIS W/CONTRAST: CPT | Mod: MC

## 2024-11-18 PROCEDURE — 93017 CV STRESS TEST TRACING ONLY: CPT

## 2024-11-18 PROCEDURE — A9500: CPT

## 2024-11-18 PROCEDURE — 83036 HEMOGLOBIN GLYCOSYLATED A1C: CPT

## 2024-11-18 PROCEDURE — 80048 BASIC METABOLIC PNL TOTAL CA: CPT

## 2024-11-18 PROCEDURE — 70553 MRI BRAIN STEM W/O & W/DYE: CPT | Mod: MC

## 2024-11-18 PROCEDURE — 99285 EMERGENCY DEPT VISIT HI MDM: CPT | Mod: 25

## 2024-11-18 PROCEDURE — 82962 GLUCOSE BLOOD TEST: CPT

## 2024-11-18 PROCEDURE — 99232 SBSQ HOSP IP/OBS MODERATE 35: CPT

## 2024-11-18 PROCEDURE — 84439 ASSAY OF FREE THYROXINE: CPT

## 2024-11-18 PROCEDURE — 82533 TOTAL CORTISOL: CPT

## 2024-11-18 PROCEDURE — 72125 CT NECK SPINE W/O DYE: CPT | Mod: MC

## 2024-11-18 PROCEDURE — 96375 TX/PRO/DX INJ NEW DRUG ADDON: CPT

## 2024-11-18 PROCEDURE — 84481 FREE ASSAY (FT-3): CPT

## 2024-11-18 PROCEDURE — 85610 PROTHROMBIN TIME: CPT

## 2024-11-18 PROCEDURE — 93005 ELECTROCARDIOGRAM TRACING: CPT

## 2024-11-18 PROCEDURE — 80053 COMPREHEN METABOLIC PANEL: CPT

## 2024-11-18 PROCEDURE — 85027 COMPLETE CBC AUTOMATED: CPT

## 2024-11-18 PROCEDURE — 70450 CT HEAD/BRAIN W/O DYE: CPT | Mod: MC

## 2024-11-18 PROCEDURE — 82009 KETONE BODYS QUAL: CPT

## 2024-11-18 PROCEDURE — 36415 COLL VENOUS BLD VENIPUNCTURE: CPT

## 2024-11-18 RX ORDER — METHOCARBAMOL 500 MG/1
1 TABLET, FILM COATED ORAL
Qty: 6 | Refills: 0
Start: 2024-11-18 | End: 2024-11-19

## 2024-11-18 RX ORDER — PANTOPRAZOLE SODIUM 40 MG/1
1 TABLET, DELAYED RELEASE ORAL
Qty: 30 | Refills: 0
Start: 2024-11-18 | End: 2024-12-17

## 2024-11-18 RX ORDER — ACETAMINOPHEN 500MG 500 MG/1
1000 TABLET, COATED ORAL EVERY 8 HOURS
Refills: 0 | Status: DISCONTINUED | OUTPATIENT
Start: 2024-11-18 | End: 2024-11-18

## 2024-11-18 RX ORDER — POLYETHYLENE GLYCOL 3350 17 G/17G
17 POWDER, FOR SOLUTION ORAL
Qty: 238 | Refills: 0
Start: 2024-11-18 | End: 2024-12-01

## 2024-11-18 RX ORDER — ACALABRUTINIB 100 MG/1
100 CAPSULE, GELATIN COATED ORAL
Refills: 0 | Status: DISCONTINUED | OUTPATIENT
Start: 2024-11-18 | End: 2024-11-18

## 2024-11-18 RX ORDER — SENNOSIDES 8.6 MG
2 TABLET ORAL
Qty: 28 | Refills: 0
Start: 2024-11-18 | End: 2024-12-01

## 2024-11-18 RX ORDER — LIDOCAINE 40 MG/G
1 CREAM TOPICAL
Qty: 1 | Refills: 0
Start: 2024-11-18 | End: 2024-12-01

## 2024-11-18 RX ORDER — ACETAMINOPHEN 500MG 500 MG/1
2 TABLET, COATED ORAL
Qty: 84 | Refills: 0
Start: 2024-11-18 | End: 2024-12-01

## 2024-11-18 RX ADMIN — ACETAMINOPHEN 500MG 1000 MILLIGRAM(S): 500 TABLET, COATED ORAL at 13:45

## 2024-11-18 RX ADMIN — PANTOPRAZOLE SODIUM 40 MILLIGRAM(S): 40 TABLET, DELAYED RELEASE ORAL at 05:57

## 2024-11-18 RX ADMIN — LIDOCAINE 1 PATCH: 40 CREAM TOPICAL at 11:07

## 2024-11-18 RX ADMIN — TENOFOVIR DISOPROXIL FUMARATE TABLETS 300 MILLIGRAM(S): 300 TABLET, FILM COATED ORAL at 11:07

## 2024-11-18 RX ADMIN — Medication 88 MICROGRAM(S): at 05:58

## 2024-11-18 RX ADMIN — POLYETHYLENE GLYCOL 3350 17 GRAM(S): 17 POWDER, FOR SOLUTION ORAL at 11:07

## 2024-11-18 RX ADMIN — Medication 10 MILLIGRAM(S): at 08:13

## 2024-11-18 RX ADMIN — ACETAMINOPHEN 500MG 1000 MILLIGRAM(S): 500 TABLET, COATED ORAL at 05:58

## 2024-11-18 RX ADMIN — ACETAMINOPHEN 500MG 1000 MILLIGRAM(S): 500 TABLET, COATED ORAL at 06:28

## 2024-11-18 NOTE — DISCHARGE NOTE NURSING/CASE MANAGEMENT/SOCIAL WORK - PATIENT PORTAL LINK FT
You can access the FollowMyHealth Patient Portal offered by [facility] by registering at the following website: [email or web address]. By joining [employer]’s FollowMyHealth portal, you will also be able to view your health information using other applications (apps) compatible with our system.

## 2024-11-18 NOTE — DISCHARGE NOTE NURSING/CASE MANAGEMENT/SOCIAL WORK - FINANCIAL ASSISTANCE
[facility] provides services at a reduced cost to those who are determined to be eligible through [facility]’s financial assistance program. Information regarding [facility]’s financial assistance program can be found by going to [email or web address] or by calling [phone].

## 2024-11-18 NOTE — PROGRESS NOTE ADULT - PROVIDER SPECIALTY LIST ADULT
Internal Medicine
Pulmonology
Pulmonology
Cardiology
Cardiology
Internal Medicine
Internal Medicine
Pulmonology
Cardiology
Cardiology
Internal Medicine
Internal Medicine
Pulmonology
Endocrinology
Pain Medicine
Internal Medicine

## 2024-11-18 NOTE — CHART NOTE - NSCHARTNOTEFT_GEN_A_CORE
Case D/W Dr. Cordova. Pt had MRI Pituitary done and results as below. Pt had a syncopal event, could be related to hormonal imbalance, since no mass effect seen in MRI.     MR Head w/wo IV Cont (11.15.24 @ 16:42)     IMPRESSION:  Calcified sellar/suprasellar mass as described      FINDINGS:  There is a sellar/suprasellar mass noted along the left superior aspect   of the pituitary gland with deviation of the infundibulum towards the   right. Suprasellar extension without optic chiasmatic compression. Mass   is noted to be calcified on CT. Mass is nonenhancing and exhibits   susceptibility. Mass measures approximately 8 mm in diameter.   Differential diagnosis includes a calcified adenoma, residua of prior   apoplexy, craniopharyngioma, prior infection, and meningioma. Correlate   with history and pituitary enzymes.    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    Recommendations :    - No mass effect seen in MRI. Would need outpt follow up (long term)  - Would defer to endocrine for further appropriate medical management of panhypopituitarism.  - No urgent NSGY consult indicated as no mass effect seen in MRI.  - If primary team and Endocrine would prefer, can consult NSGY for further management and follow up.     Discussed with Neuro attending Dr. Cordova and the same was communicated to primary team Dr. Ignacio.

## 2024-11-18 NOTE — PROGRESS NOTE ADULT - SUBJECTIVE AND OBJECTIVE BOX
Time of Visit:  Patient seen and examined.     MEDICATIONS  (STANDING):  acalabrutinib 100 milliGRAM(s) Oral <User Schedule>  acetaminophen     Tablet .. 1000 milliGRAM(s) Oral every 8 hours  atorvastatin 80 milliGRAM(s) Oral at bedtime  hydrocortisone 10 milliGRAM(s) Oral <User Schedule>  hydrocortisone 5 milliGRAM(s) Oral <User Schedule>  levothyroxine 88 MICROGram(s) Oral daily  lidocaine   4% Patch 1 Patch Transdermal daily  methocarbamol 500 milliGRAM(s) Oral every 8 hours  pantoprazole    Tablet 40 milliGRAM(s) Oral before breakfast  polyethylene glycol 3350 17 Gram(s) Oral daily  senna 2 Tablet(s) Oral at bedtime  sodium chloride 0.9%. 1000 milliLiter(s) (125 mL/Hr) IV Continuous <Continuous>  tenofovir disoproxil fumarate (VIREAD) 300 milliGRAM(s) Oral daily      MEDICATIONS  (PRN):  melatonin 3 milliGRAM(s) Oral at bedtime PRN Insomnia  oxyCODONE    IR 5 milliGRAM(s) Oral every 4 hours PRN Severe Pain (7 - 10)       Medications up to date at time of exam.    ROS; No fever , chills, cough, congestion on exam. Denies SOB.   PHYSICAL EXAMINATION:    Vital Signs Last 24 Hrs  T(C): 36.7 (18 Nov 2024 14:03), Max: 36.7 (18 Nov 2024 05:02)  T(F): 98 (18 Nov 2024 14:03), Max: 98 (18 Nov 2024 05:02)  HR: 76 (18 Nov 2024 14:03) (75 - 92)  BP: 117/70 (18 Nov 2024 14:03) (97/58 - 131/73)  BP(mean): --  RR: 18 (18 Nov 2024 14:03) (16 - 18)  SpO2: 96% (18 Nov 2024 14:03) (96% - 99%)    Parameters below as of 18 Nov 2024 14:03  Patient On (Oxygen Delivery Method): room air       (if applicable)    General: Alert and oriented. Able to answer question with no SOB. No acute distress.     HEENT: Head is normocephalic and atraumatic. Extraocular muscles are intact. Mucous membranes are moist.     NECK: Supple, no palpable adenopathy.    LUNGS: Clear to auscultation bilaterally with no wheezing, rales, or rhonchi. No use of accessory muscle.      HEART: S1 S2 Regular rate and rhythm without murmur.    ABDOMEN: Soft, nontender, and nondistended. Active bowel sounds .    EXTREMITIES: Without any cyanosis, clubbing, rash, lesions or edema.    NEUROLOGIC: Awake, alert, oriented.     SKIN: Warm, dry, good turgor.      LABS:                        12.3   5.94  )-----------( 86       ( 18 Nov 2024 06:14 )             35.1     11-18    134[L]  |  101  |  20[H]  ----------------------------<  92  3.8   |  28  |  1.27    Ca    8.6      18 Nov 2024 06:14  Phos  2.7     11-18  Mg     2.4     11-18    TPro  6.4  /  Alb  3.5  /  TBili  0.7  /  DBili  x   /  AST  33  /  ALT  36  /  AlkPhos  77  11-18      Urinalysis Basic - ( 18 Nov 2024 06:14 )    Color: x / Appearance: x / SG: x / pH: x  Gluc: 92 mg/dL / Ketone: x  / Bili: x / Urobili: x   Blood: x / Protein: x / Nitrite: x   Leuk Esterase: x / RBC: x / WBC x   Sq Epi: x / Non Sq Epi: x / Bacteria: x    MICROBIOLOGY: (if applicable)    RADIOLOGY & ADDITIONAL STUDIES:  EKG:   CXR:   < from: CT Chest w/ IV Cont (11.13.24 @ 18:28) >    ACC: [id number] EXAM:  CT ABDOMEN AND PELVIS IC   ORDERED BY: BLANCA PEÑA     ACC: [id number] EXAM:  CT CHEST IC   ORDERED BY: BLANCA PEÑA     PROCEDURE DATE:  11/13/2024          INTERPRETATION:  CLINICAL INFORMATION: Left mid back pain    COMPARISON: CT chest March 18, 2020, CT abdomen/pelvis March 17, 2020    CONTRAST/COMPLICATIONS:  IV Contrast: Omnipaque 350 (accession [id number]), IV contrast documented   in unlinked concurrent exam (accession [id number])  90 cc administered   10   cc discarded  Oral Contrast: NONE  None reported at time of study completion    PROCEDURE:  CT of the Chest, Abdomen and Pelvis was performed.  Sagittal and coronal reformats were performed.    FINDINGS:  CHEST:  LUNGS AND LARGE AIRWAYS: Patent central airways.Paraseptal emphysema and   biapical scar.  PLEURA: No pleural effusion.  VESSELS: Within normal limits.  HEART: Heart size is normal. No pericardial effusion.  MEDIASTINUM AND TAVO: No lymphadenopathy.  CHEST WALL AND LOWER NECK: Within normal limits.    ABDOMEN AND PELVIS:  LIVER: Subcentimeter hypoattenuating right lobe lesion which is too small   to characterize.  BILE DUCTS: Normal caliber.  GALLBLADDER: Cholelithiasis.  SPLEEN: Mildly enlarged, measuring 13.4 cm.  PANCREAS: Within normal limits.  ADRENALS: Within normal limits.  KIDNEYS/URETERS: Within normal limits.    BLADDER: Within normal limits.  REPRODUCTIVE ORGANS: Prostate within normal limits.    BOWEL: No bowel obstruction. Appendix not visualized. Colonic   diverticulosis without diverticulitis.  PERITONEUM/RETROPERITONEUM: Within normal limits.  VESSELS: Atherosclerotic changes.  LYMPH NODES: No lymphadenopathy.  ABDOMINAL WALL: Within normal limits.  BONES: Acute fracture of the L1 vertebral body with mild compression   deformity of the superior endplate and no retropulsion.    IMPRESSION:  Acute L1 vertebral body fracture as described.        --- End of Report ---            GEETA TOLENTINO MD; Attending Radiologist  This document has been electronically signed. Nov 13 2024  6:45PM    < end of copied text >    ECHO:    IMPRESSION: 64y Male PAST MEDICAL & SURGICAL HISTORY:  Hypercholesterolemia      CLL (chronic lymphocytic leukemia)      Pituitary adenoma      Hepatitis B      S/P appendectomy  10 years ago       Impression: This is a 65 Y/O male male an active smoker .  With CLL on oral chemotherapy, hepatitis B on Tenofovir. Presented to ED after a fall in his home with symptoms of Dizziness which was resolved at this time . Patient endorses lower back pain that is worse when he moves.  His symptoms of dizziness have since resolved.  Admitted to medicine for syncope workup and acute L1 fracture. For pulmonary follow up due to showing CT Chest with Paraseptal Emphysema . No Pleural Effusion.     Suggestion:  - O2 saturation 96% room air. So far been saturating good room air.  - Reinforced the importance of smoking cessation , not willing to quit, per pt he will think about it but will follow the hospital protocol of no smoking.   - Cardiology following for Syncope.  - No Lung work up for Emphysema at this time. Can benefit with outpatient PFT .

## 2024-11-18 NOTE — DISCHARGE NOTE NURSING/CASE MANAGEMENT/SOCIAL WORK - NSDCPEFALRISK_GEN_ALL_CORE
For information on Fall & Injury Prevention, visit: [email or web address] OR  [email or web address] OR  https://www.cdc.gov/steadi/patient.html

## 2024-11-18 NOTE — PROGRESS NOTE ADULT - SUBJECTIVE AND OBJECTIVE BOX
Subjective:  Chart Notes, Work list Manager, and fingersticks reviewed. Patient reports feeling better, denies any major complaints    Review of Systems:  Constitutional: No fever  Cardiovascular: No chest pain, palpitations  Respiratory: No SOB, no cough  GI: No nausea, vomiting, abdominal pain  Endocrine: no polyuria, polydipsia    Medications (Standing):  acalabrutinib 100 milliGRAM(s) Oral <User Schedule>  acetaminophen     Tablet .. 1000 milliGRAM(s) Oral every 8 hours  atorvastatin 80 milliGRAM(s) Oral at bedtime  hydrocortisone 10 milliGRAM(s) Oral <User Schedule>  hydrocortisone 5 milliGRAM(s) Oral <User Schedule>  levothyroxine 88 MICROGram(s) Oral daily  lidocaine   4% Patch 1 Patch Transdermal daily  methocarbamol 500 milliGRAM(s) Oral every 8 hours  pantoprazole    Tablet 40 milliGRAM(s) Oral before breakfast  polyethylene glycol 3350 17 Gram(s) Oral daily  senna 2 Tablet(s) Oral at bedtime  sodium chloride 0.9%. 1000 milliLiter(s) (125 mL/Hr) IV Continuous <Continuous>  tenofovir disoproxil fumarate (VIREAD) 300 milliGRAM(s) Oral daily    Medications  (PRN):  melatonin 3 milliGRAM(s) Oral at bedtime PRN Insomnia  oxyCODONE    IR 5 milliGRAM(s) Oral every 4 hours PRN Severe Pain (7 - 10)    Physical examination:  VITALS: T(C): 36.7 (11-18-24 @ 14:03)  T(F): 98 (11-18-24 @ 14:03), Max: 98 (11-18-24 @ 05:02)  HR: 76 (11-18-24 @ 14:03) (75 - 92)  BP: 117/70 (11-18-24 @ 14:03) (97/58 - 131/73)  RR:  (16 - 18)  SpO2:  (96% - 99%)    GENERAL: NAD,   HEENT: Dry mucous membranes  THYROID: Normal size, no palpable nodules  GI: Soft, nontender, non distended, +ve abdominal obesity  EXTREMITIES: +ve peripheral pulses, -ve pedal edema  SKIN: Dry, intact, No rashes or lesions  PSYCH: Alert and oriented x 3    Labs:      134[L]  |  101  |  20[H]  ----------------------------<  92  3.8   |  28  |  1.27  eGFR: 63  Ca    8.6      11-18  Mg     2.4     11-18  Phos  2.7     11-18  11-18  Free Thyroxine, Serum: 1.5 ng/dL (11.16.24 @ 06:44)    Assessment and Plan:  64-year-old male with history of  pituitary adenoma, central hypothyroidism, hyperprolactinemia, secondary adrenal insufficiency, secondary hypogonadism, vitamin D insufficiency, hyperlipidemia, vestibular migraine, prediabetes, CLL on oral chemotherapy, hepatitis B on Tenofovir, who presents to the ED after a fall post syncope. Admitted for syncope and Acute L1 fracture.    1) Pituitary macroadenoma  Patient has multiple pituitary hormones deficiency due to pituitary macroadenoma with mild compression of optic chiasm  Does not have arginine vasopressin deficiency at this time  Per endocrinology note in May 2024, the MRI brain was done in March 2024 and pituitary gland was stable in size, however the imaging is available only of MRI in 2020 on PACS.   Repeat MRI brain shows stable pituitary adenoma without pituitary apoplexy    1A) Central adrenal insufficiency:  Patient's BP and electrolytes are wnl  Continue 10 mg of hydrocortisone in AM and 5 mg of hydrocortisone  If the SBP decrease to <80 in hospital,  recommend to start stress dose IV hydrocortisone of 50 mg  q 12 hr.  AM cortisol is iatrogenically low as patient is on hydrocortisone    1B) Central hypothyroidism:  Free T4 wnl  Continue levothyroxine 88 mcg in the empty stomach in the morning 1 hour before food and other medications    1C) Central hypogonadism:  Patient has not started testosterone injection yet. Has to follow up with urology to start testosterone injections  His total testosterone level in may 2024 was <4 and free testosterone levels was 0.  Counselled extensively to follow up with urology outpatient ( which was recommended by her endocrinologist)  to start testosterone injections heron for his bone health in setting of his Lumbar spine fracture    Counselled wife on phone and patient to follow up with endocrinology, urology ( testosterone injection as per her outpatient endocrine), neurosurgery once he is discharged from the hospital.   Discussed to take extra hydrocortisone doses on sick days or when he is feeling dizzy.       Discussed endocrine plan of care with patient and primary team

## 2024-11-18 NOTE — PROGRESS NOTE ADULT - SUBJECTIVE AND OBJECTIVE BOX
C A R D I O L O G Y  **********************************     DATE OF SERVICE: 11-18-24    Patient denies chest pain or shortness of breath.   Review of symptoms otherwise negative.    acalabrutinib 100 milliGRAM(s) Oral <User Schedule>  acetaminophen     Tablet .. 1000 milliGRAM(s) Oral every 8 hours  atorvastatin 80 milliGRAM(s) Oral at bedtime  hydrocortisone 10 milliGRAM(s) Oral <User Schedule>  hydrocortisone 5 milliGRAM(s) Oral <User Schedule>  influenza   Vaccine 0.5 milliLiter(s) IntraMuscular once  levothyroxine 88 MICROGram(s) Oral daily  lidocaine   4% Patch 1 Patch Transdermal daily  melatonin 3 milliGRAM(s) Oral at bedtime PRN  methocarbamol 500 milliGRAM(s) Oral every 8 hours  oxyCODONE    IR 5 milliGRAM(s) Oral every 4 hours PRN  pantoprazole    Tablet 40 milliGRAM(s) Oral before breakfast  polyethylene glycol 3350 17 Gram(s) Oral daily  senna 2 Tablet(s) Oral at bedtime  sodium chloride 0.9%. 1000 milliLiter(s) IV Continuous <Continuous>  tenofovir disoproxil fumarate (VIREAD) 300 milliGRAM(s) Oral daily                            12.3   5.94  )-----------( 86       ( 18 Nov 2024 06:14 )             35.1       Hemoglobin: 12.3 g/dL (11-18 @ 06:14)  Hemoglobin: 12.4 g/dL (11-15 @ 05:46)  Hemoglobin: 12.8 g/dL (11-14 @ 05:25)  Hemoglobin: 12.4 g/dL (11-13 @ 15:56)      11-18    134[L]  |  101  |  20[H]  ----------------------------<  92  3.8   |  28  |  1.27    Ca    8.6      18 Nov 2024 06:14  Phos  2.7     11-18  Mg     2.4     11-18    TPro  6.4  /  Alb  3.5  /  TBili  0.7  /  DBili  x   /  AST  33  /  ALT  36  /  AlkPhos  77  11-18    Creatinine Trend: 1.27<--, 1.31<--, 1.34<--, 1.37<--    COAGS:           T(C): 36.6 (11-18-24 @ 10:34), Max: 36.9 (11-17-24 @ 14:13)  HR: 86 (11-18-24 @ 10:34) (74 - 92)  BP: 97/58 (11-18-24 @ 10:34) (97/58 - 131/73)  RR: 18 (11-18-24 @ 10:34) (16 - 18)  SpO2: 97% (11-18-24 @ 10:34) (96% - 99%)  Wt(kg): --    I&O's Summary    17 Nov 2024 07:01  -  18 Nov 2024 07:00  --------------------------------------------------------  IN: 0 mL / OUT: 1000 mL / NET: -1000 mL        Gen: Appears well in NAD  HEENT:  (-)icterus (-)pallor  CV: N S1 S2 1/6 KELLY (+)2 Pulses B/l  Resp:  Clear to ausculatation B/L, normal effort  GI: (+) BS Soft, NT, ND  Lymph:  (-)Edema, (-)obvious lymphadenopathy  Skin: Warm to touch, Normal turgor  Psych: Appropriate mood and affect      TELEMETRY: 	sinus        ASSESSMENT/PLAN: 	64y  Male with history of 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, central hypothyroidism, hyperprolactinemia, secondary adrenal insufficiency, secondary hypogonadism, vitamin D insufficiency, hyperlipidemia, vestibular migraine, prediabetes, CLL on oral chemotherapy, hepatitis B on Tenofovir, who presents to the ED after a syncopal event    # Syncope   I suspect this was a vagal/Hemodynamically mediated event , not orthostatic on yesterday's Virtals   - Narrow QRS on EKG is reassuring  - no pertinent findings on tele or echo  - Stress with normal perfusion  - Plan for out pt event monitor with primary cardio       Víctor Mina MD, [facility]  BEEPER [phone]

## 2024-11-18 NOTE — DISCHARGE NOTE NURSING/CASE MANAGEMENT/SOCIAL WORK - NSDCFUADDAPPT_GEN_ALL_CORE_FT
APPTS ARE READY TO BE MADE: [X] YES    Best Family or Patient Contact (if needed):    Additional Information about above appointments (if needed):    1: endocrinology - dr. carias  2: pcp - dr. gillespie  3: ortho - dr. landry  4. oncology - dr. de oliveira  5. neurosurgery - dr. peralta    Other comments or requests:

## 2024-11-19 NOTE — CHART NOTE - NSCHARTNOTEFT_GEN_A_CORE
Family member answered on behalf of the patient and advised they will pass forward our details for the patient. But they asked if we could call back tomorrow 11/20/2024 to speak to the spouse about follow-up care appointments.

## 2024-11-20 ENCOUNTER — APPOINTMENT (OUTPATIENT)
Dept: UROLOGY | Facility: CLINIC | Age: 64
End: 2024-11-20

## 2024-11-26 ENCOUNTER — NON-APPOINTMENT (OUTPATIENT)
Age: 64
End: 2024-11-26

## 2024-11-26 ENCOUNTER — APPOINTMENT (OUTPATIENT)
Age: 64
End: 2024-11-26
Payer: MEDICAID

## 2024-11-26 VITALS
OXYGEN SATURATION: 97 % | WEIGHT: 146 LBS | BODY MASS INDEX: 24.92 KG/M2 | HEART RATE: 92 BPM | SYSTOLIC BLOOD PRESSURE: 115 MMHG | HEIGHT: 63.98 IN | DIASTOLIC BLOOD PRESSURE: 79 MMHG

## 2024-11-26 DIAGNOSIS — S32.010A WEDGE COMPRESSION FRACTURE OF FIRST LUMBAR VERTEBRA, INITIAL ENCOUNTER FOR CLOSED FRACTURE: ICD-10-CM

## 2024-11-26 DIAGNOSIS — M54.50 LOW BACK PAIN, UNSPECIFIED: ICD-10-CM

## 2024-11-26 PROCEDURE — 99203 OFFICE O/P NEW LOW 30 MIN: CPT

## 2024-11-26 RX ORDER — DICLOFENAC SODIUM 10 MG/G
1 GEL TOPICAL DAILY
Qty: 1 | Refills: 1 | Status: ACTIVE | COMMUNITY
Start: 2024-11-26 | End: 1900-01-01

## 2024-11-27 ENCOUNTER — APPOINTMENT (OUTPATIENT)
Dept: NEUROSURGERY | Facility: CLINIC | Age: 64
End: 2024-11-27

## 2024-11-27 VITALS
DIASTOLIC BLOOD PRESSURE: 65 MMHG | OXYGEN SATURATION: 99 % | SYSTOLIC BLOOD PRESSURE: 103 MMHG | HEIGHT: 63.98 IN | BODY MASS INDEX: 24.41 KG/M2 | HEART RATE: 73 BPM | TEMPERATURE: 97 F | WEIGHT: 143 LBS

## 2024-11-27 PROCEDURE — 99204 OFFICE O/P NEW MOD 45 MIN: CPT

## 2024-11-27 RX ORDER — TENOFOVIR DISOPROXIL FUMARATE 300 MG/1
300 TABLET ORAL
Refills: 0 | Status: ACTIVE | COMMUNITY

## 2024-12-04 ENCOUNTER — APPOINTMENT (OUTPATIENT)
Dept: ENDOCRINOLOGY | Facility: CLINIC | Age: 64
End: 2024-12-04
Payer: MEDICAID

## 2024-12-04 VITALS
HEIGHT: 63.98 IN | DIASTOLIC BLOOD PRESSURE: 77 MMHG | OXYGEN SATURATION: 98 % | WEIGHT: 142 LBS | HEART RATE: 94 BPM | SYSTOLIC BLOOD PRESSURE: 114 MMHG | TEMPERATURE: 97.7 F | BODY MASS INDEX: 24.24 KG/M2

## 2024-12-04 DIAGNOSIS — E03.8 OTHER SPECIFIED HYPOTHYROIDISM: ICD-10-CM

## 2024-12-04 DIAGNOSIS — E55.9 VITAMIN D DEFICIENCY, UNSPECIFIED: ICD-10-CM

## 2024-12-04 DIAGNOSIS — D35.2 BENIGN NEOPLASM OF PITUITARY GLAND: ICD-10-CM

## 2024-12-04 DIAGNOSIS — E27.49 OTHER ADRENOCORTICAL INSUFFICIENCY: ICD-10-CM

## 2024-12-04 DIAGNOSIS — M43.9 DEFORMING DORSOPATHY, UNSPECIFIED: ICD-10-CM

## 2024-12-04 DIAGNOSIS — R73.03 PREDIABETES.: ICD-10-CM

## 2024-12-04 DIAGNOSIS — E23.0 HYPOPITUITARISM: ICD-10-CM

## 2024-12-04 PROCEDURE — 36415 COLL VENOUS BLD VENIPUNCTURE: CPT

## 2024-12-04 PROCEDURE — 99214 OFFICE O/P EST MOD 30 MIN: CPT

## 2024-12-08 LAB
25(OH)D3 SERPL-MCNC: 26.1 NG/ML
ACTH SER-ACNC: 5.5 PG/ML
ALBUMIN SERPL ELPH-MCNC: 4.7 G/DL
ALP BLD-CCNC: 101 U/L
ALT SERPL-CCNC: 14 U/L
ANION GAP SERPL CALC-SCNC: 15 MMOL/L
AST SERPL-CCNC: 13 U/L
BILIRUB SERPL-MCNC: 0.4 MG/DL
BUN SERPL-MCNC: 28 MG/DL
CALCIUM SERPL-MCNC: 9.8 MG/DL
CHLORIDE SERPL-SCNC: 102 MMOL/L
CO2 SERPL-SCNC: 24 MMOL/L
CORTIS SERPL-MCNC: 20 UG/DL
CREAT SERPL-MCNC: 1.38 MG/DL
EGFR: 57 ML/MIN/1.73M2
FSH SERPL-MCNC: 0.7 IU/L
GLUCOSE SERPL-MCNC: 85 MG/DL
IGF-1 INTERP: NORMAL
IGF-I BLD-MCNC: 19 NG/ML
LH SERPL-ACNC: <0.3 IU/L
POTASSIUM SERPL-SCNC: 3.5 MMOL/L
PROLACTIN SERPL-MCNC: 18 NG/ML
PROT SERPL-MCNC: 7.2 G/DL
SODIUM SERPL-SCNC: 141 MMOL/L
T3 SERPL-MCNC: 94 NG/DL
T4 FREE SERPL-MCNC: 1.4 NG/DL
TESTOST FREE SERPL-MCNC: 0 PG/ML
TESTOST SERPL-MCNC: <2.5 NG/DL
TSH SERPL-ACNC: <0.01 UIU/ML

## 2024-12-10 ENCOUNTER — APPOINTMENT (OUTPATIENT)
Dept: UROLOGY | Facility: CLINIC | Age: 64
End: 2024-12-10
Payer: MEDICAID

## 2024-12-10 VITALS
HEART RATE: 91 BPM | HEIGHT: 64 IN | WEIGHT: 142 LBS | OXYGEN SATURATION: 97 % | SYSTOLIC BLOOD PRESSURE: 117 MMHG | BODY MASS INDEX: 24.24 KG/M2 | TEMPERATURE: 97.4 F | DIASTOLIC BLOOD PRESSURE: 82 MMHG

## 2024-12-10 DIAGNOSIS — E29.1 TESTICULAR HYPOFUNCTION: ICD-10-CM

## 2024-12-10 PROCEDURE — G2211 COMPLEX E/M VISIT ADD ON: CPT | Mod: NC

## 2024-12-10 PROCEDURE — 99204 OFFICE O/P NEW MOD 45 MIN: CPT

## 2024-12-17 RX ORDER — ERGOCALCIFEROL 1.25 MG/1
1.25 MG CAPSULE ORAL DAILY
Qty: 12 | Refills: 0 | Status: ACTIVE | COMMUNITY
Start: 2024-12-16 | End: 1900-01-01

## 2025-01-02 NOTE — DISCHARGE NOTE PROVIDER - NSDCHHHOMEBOUND_GEN_ALL_CORE
Case: [id number] Date/Time: 01/02/25 6880    Procedure: FIBER OPTIC BRONCHOSCOPY WITH POSSIBLE WASH, BRUSH, BRONCHOALVEOLAR LAVAGE, BIOPSY AND FINE NEEDLE ASPIRATION (Chest)    Anesthesia type: General    Pre-op diagnosis: ELEVATED HEMIDIAPHRAGM, MIXED RESTRICTED AND OBSTRUCTIVE LUNG DISEASE    Location:  ENDOSCOPIC ULTRASOUND ROOM / SURGERY [facility]    Surgeons: Scot Christianson M.D.            Relevant Problems   PULMONARY   (positive) COPD exacerbation (HCC)   (positive) SOB (shortness of breath) on exertion      CARDIAC   (positive) Atrial flutter (HCC)   (positive) Paroxysmal atrial fibrillation (HCC)   (positive) Primary hypertension   (positive) Pulmonary hypertension (HCC)   (positive) SOB (shortness of breath) on exertion      GI   (positive) GERD (gastroesophageal reflux disease)       Physical Exam    Airway   Mallampati: II  TM distance: >3 FB  Neck ROM: full       Cardiovascular - normal exam  Rhythm: regular  Rate: normal  (-) murmur     Dental - normal exam           Pulmonary - normal exam  Breath sounds clear to auscultation     Abdominal    Neurological - normal exam                   Anesthesia Plan    ASA 3   ASA physical status 3 criteria: COPD - poorly controlled    Plan - general       Airway plan will be ETT          Induction: intravenous    Postoperative Plan: Postoperative administration of opioids is intended.    Pertinent diagnostic labs and testing reviewed    Informed Consent:    Anesthetic plan and risks discussed with patient.    Use of blood products discussed with: patient whom consented to blood products.            Ataxic gait

## 2025-01-14 ENCOUNTER — APPOINTMENT (OUTPATIENT)
Age: 65
End: 2025-01-14
Payer: COMMERCIAL

## 2025-01-14 VITALS
OXYGEN SATURATION: 97 % | WEIGHT: 142 LBS | DIASTOLIC BLOOD PRESSURE: 79 MMHG | BODY MASS INDEX: 24.24 KG/M2 | HEIGHT: 64 IN | SYSTOLIC BLOOD PRESSURE: 116 MMHG | HEART RATE: 94 BPM

## 2025-01-14 DIAGNOSIS — S32.010A WEDGE COMPRESSION FRACTURE OF FIRST LUMBAR VERTEBRA, INITIAL ENCOUNTER FOR CLOSED FRACTURE: ICD-10-CM

## 2025-01-14 DIAGNOSIS — M54.50 LOW BACK PAIN, UNSPECIFIED: ICD-10-CM

## 2025-01-14 PROCEDURE — 99213 OFFICE O/P EST LOW 20 MIN: CPT

## 2025-01-14 PROCEDURE — 72100 X-RAY EXAM L-S SPINE 2/3 VWS: CPT

## 2025-02-06 ENCOUNTER — APPOINTMENT (OUTPATIENT)
Dept: NEUROSURGERY | Facility: CLINIC | Age: 65
End: 2025-02-06

## 2025-03-05 ENCOUNTER — APPOINTMENT (OUTPATIENT)
Dept: NEUROSURGERY | Facility: CLINIC | Age: 65
End: 2025-03-05
Payer: COMMERCIAL

## 2025-03-05 VITALS
DIASTOLIC BLOOD PRESSURE: 74 MMHG | HEART RATE: 92 BPM | HEIGHT: 63 IN | BODY MASS INDEX: 24.98 KG/M2 | WEIGHT: 141 LBS | SYSTOLIC BLOOD PRESSURE: 109 MMHG

## 2025-03-05 PROCEDURE — 99215 OFFICE O/P EST HI 40 MIN: CPT

## 2025-03-06 DIAGNOSIS — M81.0 AGE-RELATED OSTEOPOROSIS W/OUT CURRENT PATHOLOGICAL FRACTURE: ICD-10-CM

## 2025-03-18 ENCOUNTER — APPOINTMENT (OUTPATIENT)
Age: 65
End: 2025-03-18
Payer: COMMERCIAL

## 2025-03-18 VITALS
BODY MASS INDEX: 24.98 KG/M2 | SYSTOLIC BLOOD PRESSURE: 113 MMHG | WEIGHT: 141 LBS | DIASTOLIC BLOOD PRESSURE: 78 MMHG | HEART RATE: 74 BPM | OXYGEN SATURATION: 98 % | HEIGHT: 63 IN

## 2025-03-18 DIAGNOSIS — M54.50 LOW BACK PAIN, UNSPECIFIED: ICD-10-CM

## 2025-03-18 PROCEDURE — 72110 X-RAY EXAM L-2 SPINE 4/>VWS: CPT

## 2025-03-18 PROCEDURE — 99213 OFFICE O/P EST LOW 20 MIN: CPT

## 2025-03-19 ENCOUNTER — APPOINTMENT (OUTPATIENT)
Dept: ENDOCRINOLOGY | Facility: CLINIC | Age: 65
End: 2025-03-19
Payer: COMMERCIAL

## 2025-03-19 VITALS
SYSTOLIC BLOOD PRESSURE: 104 MMHG | DIASTOLIC BLOOD PRESSURE: 71 MMHG | TEMPERATURE: 97.2 F | WEIGHT: 141 LBS | HEART RATE: 86 BPM | HEIGHT: 63 IN | OXYGEN SATURATION: 97 % | BODY MASS INDEX: 24.98 KG/M2

## 2025-03-19 DIAGNOSIS — E55.9 VITAMIN D DEFICIENCY, UNSPECIFIED: ICD-10-CM

## 2025-03-19 DIAGNOSIS — E23.0 HYPOPITUITARISM: ICD-10-CM

## 2025-03-19 DIAGNOSIS — R73.03 PREDIABETES.: ICD-10-CM

## 2025-03-19 DIAGNOSIS — S32.010A WEDGE COMPRESSION FRACTURE OF FIRST LUMBAR VERTEBRA, INITIAL ENCOUNTER FOR CLOSED FRACTURE: ICD-10-CM

## 2025-03-19 DIAGNOSIS — E27.49 OTHER ADRENOCORTICAL INSUFFICIENCY: ICD-10-CM

## 2025-03-19 DIAGNOSIS — E03.8 OTHER SPECIFIED HYPOTHYROIDISM: ICD-10-CM

## 2025-03-19 DIAGNOSIS — M81.0 AGE-RELATED OSTEOPOROSIS W/OUT CURRENT PATHOLOGICAL FRACTURE: ICD-10-CM

## 2025-03-19 PROCEDURE — 36415 COLL VENOUS BLD VENIPUNCTURE: CPT

## 2025-03-19 PROCEDURE — 99214 OFFICE O/P EST MOD 30 MIN: CPT

## 2025-03-20 LAB
25(OH)D3 SERPL-MCNC: 38 NG/ML
ACTH SER-ACNC: 3.4 PG/ML
ALBUMIN SERPL ELPH-MCNC: 4.5 G/DL
ALP BLD-CCNC: 72 U/L
ALT SERPL-CCNC: 12 U/L
ANION GAP SERPL CALC-SCNC: 14 MMOL/L
AST SERPL-CCNC: 19 U/L
BILIRUB SERPL-MCNC: 0.4 MG/DL
BUN SERPL-MCNC: 23 MG/DL
CALCIUM SERPL-MCNC: 9.6 MG/DL
CHLORIDE SERPL-SCNC: 103 MMOL/L
CO2 SERPL-SCNC: 24 MMOL/L
CORTIS SERPL-MCNC: 2.4 UG/DL
CREAT SERPL-MCNC: 1.32 MG/DL
EGFRCR SERPLBLD CKD-EPI 2021: 60 ML/MIN/1.73M2
ESTIMATED AVERAGE GLUCOSE: 100 MG/DL
FSH SERPL-MCNC: 0.8 IU/L
GLUCOSE SERPL-MCNC: 91 MG/DL
HBA1C MFR BLD HPLC: 5.1 %
IGF-1 INTERP: NORMAL
IGF-I BLD-MCNC: 24 NG/ML
LH SERPL-ACNC: 0.3 IU/L
POTASSIUM SERPL-SCNC: 4 MMOL/L
PROLACTIN SERPL-MCNC: 20.6 NG/ML
PROT SERPL-MCNC: 6.8 G/DL
SODIUM SERPL-SCNC: 141 MMOL/L
T3 SERPL-MCNC: 106 NG/DL
T4 FREE SERPL-MCNC: 1.4 NG/DL
TSH SERPL-ACNC: 0.01 UIU/ML

## 2025-03-20 RX ORDER — ACETAMINOPHEN 500 MG/1
500 TABLET ORAL EVERY 8 HOURS
Refills: 0 | Status: ACTIVE | COMMUNITY
Start: 2025-03-20

## 2025-03-20 RX ORDER — CHOLECALCIFEROL (VITAMIN D3) 1250 MCG
1.25 MG CAPSULE ORAL
Refills: 0 | Status: ACTIVE | COMMUNITY
Start: 2025-03-20

## 2025-03-20 RX ORDER — PANTOPRAZOLE 40 MG/1
40 TABLET, DELAYED RELEASE ORAL
Refills: 0 | Status: ACTIVE | COMMUNITY
Start: 2025-03-20

## 2025-03-20 RX ORDER — TENOFOVIR DISOPROXIL FUMARATE 300 MG/1
300 TABLET, COATED ORAL
Refills: 0 | Status: ACTIVE | COMMUNITY
Start: 2025-03-20

## 2025-03-20 RX ORDER — ALLOPURINOL 300 MG/1
300 TABLET ORAL
Qty: 90 | Refills: 0 | Status: ACTIVE | COMMUNITY
Start: 2025-03-20

## 2025-03-20 RX ORDER — POLYETHYLENE GLYCOL 3350 17 G/17G
17 POWDER, FOR SOLUTION ORAL
Refills: 0 | Status: ACTIVE | COMMUNITY
Start: 2025-03-20

## 2025-03-20 RX ORDER — ACALABRUTINIB 100 MG/1
100 TABLET, FILM COATED ORAL TWICE DAILY
Refills: 0 | Status: ACTIVE | COMMUNITY
Start: 2025-03-20

## 2025-03-25 NOTE — DISCHARGE NOTE PROVIDER - HOSPITAL COURSE
I discussed the breast pathology with the patient. [facility]    60 years old male from home, lives with wife, with a PMHx of HLP (on simvastatin) and a significant PSHx of appendectomy, presents to the ED with complaints of unwitnessed syncopal episode x2. Patient reports he ate some pizza and then vomited. Notes when he got up, he felt a room spinning sensation, ear ringing and passed out. Notes no other symptoms after passing out. Patient states he then again had a second syncopal episode when he got up quickly from the first. Notes his has happened in the past which improved with rehydration. Patient wife states that  second episode was witnessed by her. Patient LOC was 1-2 minutes and patient was asymptomatic after the episode. Patient endorses mild upset stomach. Patient denies incontinence, chest pain, shortness of breath, fever, cough, burning urination or any other symptoms.    Patient is being admitted for syncopal workup most likely vasovagal syncope. EKG is negative for any acute ischemic changes, troponin T1 is negative. CT head did show a questionable emboli. Will admit to telemetry to rule out stroke. No neurological changes on examination. Offered CT angio but he refused because of ? allergy last time he had CT scan.    Interval history: While trying to get orthostatic, he felt dizzy and his blood pressure dropped. s/p 1 L bolus followed by aggressive IV hydration.     Allergies: Dye/contrast: difficulty breathing 60 years old male from home, lives with wife, with a PMHx of HLP (on simvastatin) and a significant PSHx of appendectomy, presents to the ED with complaints of unwitnessed syncopal episode x2. Patient reports he ate some pizza and then vomited. Notes when he got up, he felt a room spinning sensation, ear ringing and passed out. Notes no other symptoms after passing out. Patient states he then again had a second syncopal episode when he got up quickly from the first. Notes his has happened in the past which improved with rehydration. Patient wife states that  second episode was witnessed by her. Patient LOC was 1-2 minutes and patient was asymptomatic after the episode. Patient endorses mild upset stomach. Patient denies incontinence, chest pain, shortness of breath, fever, cough, burning urination or any other symptoms.    Patient is being admitted for syncopal workup most likely vasovagal syncope. EKG is negative for any acute ischemic changes, troponin T1 is negative. CT head did show a questionable emboli. Will admit to telemetry to rule out stroke. No neurological changes on examination. Offered CT angio but he refused because of ? allergy last time he had CT scan.    Interval history: While trying to get orthostatic, he felt dizzy and his blood pressure dropped. s/p 1 L bolus followed by aggressive IV hydration.     Allergies: Dye/contrast: difficulty breathing        Pt was admitted for syncope work up 60 years old male from home, lives with wife, with a PMHx of HLP (on simvastatin) and a significant PSHx of appendectomy, presents to the ED with complaints of unwitnessed syncopal episode x2. Patient reports he ate some pizza and then vomited. Notes when he got up, he felt a room spinning sensation, ear ringing and passed out. Notes no other symptoms after passing out. Patient states he then again had a second syncopal episode when he got up quickly from the first. Notes his has happened in the past which improved with rehydration. Patient wife states that  second episode was witnessed by her. Patient LOC was 1-2 minutes and patient was asymptomatic after the episode. Patient endorses mild upset stomach. Patient denies incontinence, chest pain, shortness of breath, fever, cough, burning urination or any other symptoms.    Patient is being admitted for syncopal workup most likely vasovagal syncope. EKG is negative for any acute ischemic changes, troponin T1 is negative. CT head did show a questionable emboli. Will admit to telemetry to rule out stroke. No neurological changes on examination.    Interval history: While trying to get orthostatic, he felt dizzy and his blood pressure dropped. s/p 1 L bolus followed by aggressive IV hydration.     Allergies: Dye/contrast: difficulty breathing        Pt was admitted for syncope work up Syncope and collapse: Troponin neg x 2. BP improved with IV hydration. CT Head noted for Apparent sellar mass with calcifications with suprasellar extension.    CTA chest noted for Small filling defect in a right upper lobe segmental pulmonary artery, compatible with pulmonary embolism. Small bilateral pleural effusions. This was followd by V/Q scan which showed low probabilty for PE. Patient was not started on anticoagulation. Pulmonologist was consulted and recommended outpatient for up for PFT's and repeat CT in 4-6 months         Dr. Myles (Neurology) consulted and recommended MR head w contrast which showed There is an enhancing mass filling and expanding the sella turcica. It measures 1.5 cm and caudad dimension, 1.2 cm in AP dimension and 1.5 cm transversely. It encroaches on and elevates the optic chiasm.     Endocrinologist was consulted as cortisol levels done on routine earlier showed it was low. Repeat cortisol and ACTH levels were tested and showed XXX. Pt will need follow up with endocrinologist after discharge to complete further test for pituitary panel. Pt will also need to see ophthalmologist as the mass is right above optic chiasm.         Sepsis: ED on admission spiked  101.9 which was later noted to be     BP improved with IV hydration. WBC 19.97. CXR & UA negative, no s/s GI upset. WBC remained elevated despite an source of infection     CXR & UA negative, no s/s GI upseT. Blood cultures AND Urine cultures NGTD        Hypercholesterolemia: HDL low, otherwise lipid panel stable. Pt takes Simvastatin at home. Continue with home regimen.             Given patient's improved clinical status and current hemodynamic stability, decision was made to discharge.    Please refer to patient's complete medical chart with documents for a full hospital course, for this is only a brief summary.

## 2025-06-10 ENCOUNTER — APPOINTMENT (OUTPATIENT)
Age: 65
End: 2025-06-10
Payer: COMMERCIAL

## 2025-06-10 PROCEDURE — 99213 OFFICE O/P EST LOW 20 MIN: CPT

## 2025-06-10 RX ORDER — MELOXICAM 7.5 MG/1
7.5 TABLET ORAL
Qty: 28 | Refills: 1 | Status: ACTIVE | COMMUNITY
Start: 2025-06-10 | End: 1900-01-01

## 2025-06-10 RX ORDER — TIZANIDINE 2 MG/1
2 TABLET ORAL EVERY 6 HOURS
Qty: 56 | Refills: 1 | Status: ACTIVE | COMMUNITY
Start: 2025-06-10 | End: 1900-01-01

## 2025-06-11 ENCOUNTER — APPOINTMENT (OUTPATIENT)
Dept: ENDOCRINOLOGY | Facility: CLINIC | Age: 65
End: 2025-06-11
Payer: COMMERCIAL

## 2025-06-11 VITALS
OXYGEN SATURATION: 99 % | TEMPERATURE: 97.1 F | WEIGHT: 140.44 LBS | DIASTOLIC BLOOD PRESSURE: 84 MMHG | HEIGHT: 63 IN | SYSTOLIC BLOOD PRESSURE: 120 MMHG | BODY MASS INDEX: 24.88 KG/M2 | HEART RATE: 79 BPM

## 2025-06-11 PROCEDURE — 36415 COLL VENOUS BLD VENIPUNCTURE: CPT

## 2025-06-11 PROCEDURE — 99214 OFFICE O/P EST MOD 30 MIN: CPT

## 2025-06-12 LAB
25(OH)D3 SERPL-MCNC: 64.3 NG/ML
ACTH SER-ACNC: 8.6 PG/ML
ALBUMIN SERPL ELPH-MCNC: 4.6 G/DL
ALP BLD-CCNC: 67 U/L
ALT SERPL-CCNC: 17 U/L
ANION GAP SERPL CALC-SCNC: 17 MMOL/L
AST SERPL-CCNC: 21 U/L
BILIRUB SERPL-MCNC: 0.3 MG/DL
BUN SERPL-MCNC: 25 MG/DL
CALCIUM SERPL-MCNC: 9.4 MG/DL
CHLORIDE SERPL-SCNC: 103 MMOL/L
CHOLEST SERPL-MCNC: 249 MG/DL
CO2 SERPL-SCNC: 22 MMOL/L
CORTIS SERPL-MCNC: 0.9 UG/DL
CREAT SERPL-MCNC: 1.38 MG/DL
EGFRCR SERPLBLD CKD-EPI 2021: 57 ML/MIN/1.73M2
ESTIMATED AVERAGE GLUCOSE: 108 MG/DL
FSH SERPL-MCNC: 0.9 IU/L
GLUCOSE SERPL-MCNC: 92 MG/DL
HBA1C MFR BLD HPLC: 5.4 %
HDLC SERPL-MCNC: 57 MG/DL
IGF-1 INTERP: NORMAL
IGF-I BLD-MCNC: 31 NG/ML
LDLC SERPL-MCNC: 150 MG/DL
LH SERPL-ACNC: <0.3 IU/L
NONHDLC SERPL-MCNC: 192 MG/DL
POTASSIUM SERPL-SCNC: 4.1 MMOL/L
PROLACTIN SERPL-MCNC: 21.9 NG/ML
PROT SERPL-MCNC: 6.8 G/DL
SODIUM SERPL-SCNC: 142 MMOL/L
T4 FREE SERPL-MCNC: 1.5 NG/DL
TRIGL SERPL-MCNC: 233 MG/DL
TSH SERPL-ACNC: 0.01 UIU/ML
VIT B12 SERPL-MCNC: 814 PG/ML

## 2025-06-13 LAB
TESTOST FREE SERPL-MCNC: 0 PG/ML
TESTOST SERPL-MCNC: <2.5 NG/DL